# Patient Record
Sex: MALE | Race: WHITE | NOT HISPANIC OR LATINO | Employment: STUDENT | URBAN - METROPOLITAN AREA
[De-identification: names, ages, dates, MRNs, and addresses within clinical notes are randomized per-mention and may not be internally consistent; named-entity substitution may affect disease eponyms.]

---
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
## 2023-01-04 ENCOUNTER — TELEPHONE (OUTPATIENT)
Dept: ORTHOPEDICS | Facility: CLINIC | Age: 18
End: 2023-01-04

## 2023-01-04 NOTE — TELEPHONE ENCOUNTER
I called Briana with [facility] back and directed them to the [facility] phone number and fax number for their medical records request as we are not able to release that information through the clinic.     Sudarshan Ellis, EMT

## 2023-01-04 NOTE — TELEPHONE ENCOUNTER
[facility] Call Center    Phone Message    May a detailed message be left on voicemail: yes     Reason for Call: Other: Dr Call from [facility] requesting imaging xrays most recent and reports faxed to [phone] . They will be faxing NICOLE . Not sure if images can be pushed to their system      Action Taken: Other: ortho [facility]    Travel Screening: Not Applicable

## 2023-02-03 ENCOUNTER — TELEPHONE (OUTPATIENT)
Dept: ORTHOPEDICS | Facility: CLINIC | Age: 18
End: 2023-02-03
Payer: COMMERCIAL

## 2023-02-03 NOTE — TELEPHONE ENCOUNTER
See My Chart message sent from pt wanting documentation for the airline of [facility] & canceled appts 2-8-23 & 2-16-23.  I called pt back & told him I will do a letter of documentation needed & he can print letter from My Chart & I will also postal mail it to him.  He stated they just bought a $4,000 Brace to see if it helps symptoms & postpone surgery as long as poss. So he does not need RTN appt at this time.  He understands whenever he makes RTN appt in future, that he needs the 3 MRIs we ordered completed before appt.,  He has copies of 3 MRI orders to have done in [place].    Call back prn.  Pt agreed. Rizwana Salcido RN.

## 2023-02-03 NOTE — LETTER
Verification of Appointment  February 3, 2023         Patient:  Hitesh Dobson  :   2005  MRN:     [id number]  Physician: RIZWANA IRIZARRY    Due to 's change in schedule, [facility]   canceled & rescheduled patients   23 appointment to 23 and then canceled the   Appointment on 23 because patient does not need a  Return appointment at this time.    Thanks for your consideration for this patient.              Electronically signed by Rizwana Irizarry RN with DR.David Torres

## 2023-02-12 ENCOUNTER — HEALTH MAINTENANCE LETTER (OUTPATIENT)
Age: 18
End: 2023-02-12

## 2023-07-18 ENCOUNTER — TELEPHONE (OUTPATIENT)
Dept: ORTHOPEDICS | Facility: CLINIC | Age: 18
End: 2023-07-18
Payer: COMMERCIAL

## 2023-07-18 NOTE — TELEPHONE ENCOUNTER
Called pt. He would like to get his MRIs done at [facility] in [place], [place]. ATC called them and obtained fax number (). MRIs order faxed to them.         -Ishan ATC- Orthopedics

## 2023-07-18 NOTE — TELEPHONE ENCOUNTER
[facility] Call Center    Phone Message    May a detailed message be left on voicemail: yes     Reason for Call: Other: Patient is requesting an order for an MRI to be taken near his home in [place], [place].     Action Taken: Message routed to:  [facility] ortho    Travel Screening: Not Applicable

## 2023-09-12 ENCOUNTER — TELEPHONE (OUTPATIENT)
Dept: ORTHOPEDICS | Facility: CLINIC | Age: 18
End: 2023-09-12
Payer: COMMERCIAL

## 2023-09-12 NOTE — TELEPHONE ENCOUNTER
[facility] Call Center    Phone Message    May a detailed message be left on voicemail: yes     Reason for Call: Other: Kamille called Hitesh needs to have the referrals for his MRIs that Dr. Torres requested faxed to [facility] in [place], [place]. Kamille also needs to know if the MRIs need a prior authorization and if so she needs the CPT codes to give to [employer]. Please call Kamille at [phone] to discuss      Action Taken: Other: [facility]    Travel Screening: Not Applicable

## 2023-09-13 NOTE — TELEPHONE ENCOUNTER
Writer called and spoke with mother on the phone. Writer re-faxed the imaging orders to [facility] in [place], [place] at [phone]. Writer also gave pt the number to call and see if can get the MRI's completed at the [facility] prior to the 10/12/23 appointment with Dr. Torres.   Pt's mother will call [phone].     Kimberly Reed LPN

## 2023-10-03 DIAGNOSIS — M42.00 SCHEUERMANN'S KYPHOSIS: Primary | ICD-10-CM

## 2023-10-10 ENCOUNTER — TELEPHONE (OUTPATIENT)
Dept: ORTHOPEDICS | Facility: CLINIC | Age: 18
End: 2023-10-10
Payer: COMMERCIAL

## 2023-10-10 NOTE — TELEPHONE ENCOUNTER
Writer called spoke with pt's mother stating that from clinic standpoint there is no reason pt can not come to be seen. IF patient is symptomatic will ask that patient wear a mask. Pt's mother states that at this time pt is not symptomatic and will keep the 10/12/23 apt.     Kimberly Reed LPN

## 2023-10-12 ENCOUNTER — ANCILLARY PROCEDURE (OUTPATIENT)
Dept: GENERAL RADIOLOGY | Facility: CLINIC | Age: 18
End: 2023-10-12
Attending: ORTHOPAEDIC SURGERY
Payer: COMMERCIAL

## 2023-10-12 ENCOUNTER — OFFICE VISIT (OUTPATIENT)
Dept: ORTHOPEDICS | Facility: CLINIC | Age: 18
End: 2023-10-12
Payer: COMMERCIAL

## 2023-10-12 VITALS — WEIGHT: 147 LBS | BODY MASS INDEX: 19.91 KG/M2 | HEIGHT: 72 IN

## 2023-10-12 DIAGNOSIS — M51.24 THORACIC DISC HERNIATION: ICD-10-CM

## 2023-10-12 DIAGNOSIS — M42.00 SCHEUERMANN'S KYPHOSIS: ICD-10-CM

## 2023-10-12 DIAGNOSIS — M42.00 SCHEUERMANN'S KYPHOSIS: Primary | ICD-10-CM

## 2023-10-12 PROCEDURE — 72082 X-RAY EXAM ENTIRE SPI 2/3 VW: CPT | Performed by: STUDENT IN AN ORGANIZED HEALTH CARE EDUCATION/TRAINING PROGRAM

## 2023-10-12 PROCEDURE — 77073 BONE LENGTH STUDIES: CPT | Performed by: STUDENT IN AN ORGANIZED HEALTH CARE EDUCATION/TRAINING PROGRAM

## 2023-10-12 PROCEDURE — 99214 OFFICE O/P EST MOD 30 MIN: CPT | Mod: GC | Performed by: ORTHOPAEDIC SURGERY

## 2023-10-12 RX ORDER — METHYLPREDNISOLONE 4 MG
4 TABLET, DOSE PACK ORAL
COMMUNITY
Start: 2023-10-09 | End: 2023-10-15

## 2023-10-12 NOTE — PROGRESS NOTES
Orthopedic spine surgery clinic note    CC: Surgical discussion follow-up    HISTORY OF PRESENT ILLNESS:  Hitesh is a 18-year-old male presenting for follow-up to further discuss possible surgical intervention of his Scheuermann's kyphosis.  Patient last seen October 2022.  At that time, given amount of deformity and severity of symptoms had an extensive discussion regarding possible surgical intervention.  Plan was for patient to get MRI of his cervical, thoracic, and lumbar spine to rule out any cord abnormalities and to call back when he was ready to schedule surgery.  Patient returning today to further discuss surgery.    Since he was last seen, patient reports no major changes in his health.  Still with diffuse back pain in his neck, mid and low back, and hips.  Denies any changes over the last year in is back in regards to symptoms and overall function but remains unhappy with his back.  His visual analog pain scale currently is a 3.  His Oswestry score is an 14.      Patient currently in his second year at [employer] and studying computer science.  Patient would like to move forward with surgery next summer when school gets out.  The school setting and in mid April so would plan to move forward with surgery in early May.    Patient denies any radicular symptoms or pain.  Denies any bowel or bladder changes.  Denies any sensory changes.  Denies any ataxia or loss of coordination in his upper or lower extremities.    PAST MEDICAL HISTORY:  He is the product of a normal pregnancy and delivery.  He did have a nuchal cord.  He met normal milestones for growth and development.  He has 1 brother who has no current issues.  The brother is a younger brother.  He has a maternal grandfather who had a curved back, but exact details are not clear.    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  Activity wise, he has been active in basketball,  volleyball, and wrestling.  He is not currently on competitive, but only intramural type activities in college.    PHYSICAL EXAMINATION:  Reveals a well-developed, well-nourished male in no acute distress.  Nonantalgic, nonmyelopathic gait.  Negative Lhermitte. able to tandem walk.  Negative Romberg.  His head is centered over his pelvis.  Shoulders and pelvis are level. He has marked increased thoracic kyphosis.  He has a mild scoliotic deformity angle of trunk rotation, 5 degrees in the thoracic spine and 5 degrees in the lumbar spine on Seymour's forward bending test.  Lower extremity reflexes are 2+ and symmetric at the knees and ankles.  No sensory changes when testing sensation longitudinally along thoracic region of the spine bilaterally.  Manual muscle testing is intact for foot dorsiflexion, plantarflexion, knee extension, hip flexion.    IMAGING:  Radiographic exam performed today includes AP and lateral full spine.  The lateral radiographic films of the key, which shows a stable thoracic kyphosis of approximately 80 degrees.      MRI of the thoracic spine does show anterior wedging of the thoracic vertebrae and Schmorl nodes consistent with diagnosis of Scheuermann's kyphosis.  Patient also multiple mild posterior disc herniations throughout the thoracic spine with subtle effacement of the cord.        ASSESSMENT:  Scheuermann's kyphosis, symptomatic, severe. 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    PLAN:  He remains a candidate for a posterior spinal fusion.  Given thoracic spine disc herniations and early cord effacement, recommend to be done in the next 1-2 years rather than wait indefinitely.  Anticipate this would be from T3-L1 with Smith-Gallo osteotomies T6 through T12. The surgery will probably take about 4.5 hours, approximately 600 mL of blood loss, and a 5-day length of stay.  We talked about the hospitalization and then the recuperation.  I would expect he would be out of school for 6  weeks and then back to sports at 3 months.    We reviewed the specific risks of surgery.  The risks include but are not limited to the risks of general anesthesia including death, heart attack, stroke, blood clot, pneumonia, and bladder infection.  Surgical risks include but are not limited to bleeding requiring a blood transfusion.  We would use tranexamic acid and a Cell Saver.  Infection:  He will be treated with prophylactic antibiotics in intra-wound antibiotics.  Nerve damage including possible paralysis.  We will use spinal cord monitoring.  Failure to heal, requiring reoperation.  Hardware problems.  We will use intraoperative O-arm and Stealth for placing the pedicle screws.  Finally, with Scheuermann's kyphosis, we can get a great radiographic result, but the patients can still have pain or symptoms.  The majority of patients are happy with their improvement, but there are risks of problems above or below wherever the fusion stops.  The challenge is that there is no nonoperative treatment for this and it is likely to alter the significant deformity and the fact that he is maximally extending his lumbar spine shows that he is at the edge of ability to compensate for the significant deformity.  He does meet the Barbosa criteria, wedging of 3 adjacent vertebral or more.    We will tentatively plan for patient to undergo surgery in May 2024.  He should be seen several month beforehand to ensure his health remains stable (around March 2024).  Patient was advised to follow-up sooner if he begins to experience myelopathic symptoms (ataxia, bowel or bladder changes, radicular symptoms down legs with Valsalva) which would be an indication for more urgent surgical intervention.    Patient expressed understanding and is in agreement with this plan. All questions answered.      Patient seen and discussed with Dr. Torres.    Santos Holcomb MD  Orthopaedic Surgery, PGY-4  Pager: [phone]    I saw and evaluated the  patient and developed the plan.  I independently interpreted the imaging studies.  I reviewed these with the patient and his family.  I explained the indication for surgery.  We discussed the surgery in some detail.  Given the thoracic disc herniations I think that it is prudent to move forward in a planned fashion.  This is not emergent but it is indicated.  The plan to move forward with surgery in the May timeframe is appropriate.  I did enter a surgical case request at this visit.

## 2023-10-12 NOTE — NURSING NOTE
"Reason For Visit:   Chief Complaint   Patient presents with    RECHECK     recheck kyphosis, discuss surgery again       Primary MD: No Ref-Primary, Physician  Ref. MD: [facility]    ?  No  Occupation Student.     Date of injury: no  Type of injury: no.     Date of surgery: no  Type of surgery: no.     Smoker: No  Request smoking cessation information: No      Ht 1.838 m (6' 0.36\")   Wt 66.7 kg (147 lb)   BMI 19.74 kg/m      Pain Assessment  Patient Currently in Pain: Yes  0-10 Pain Scale: 3  Primary Pain Location: Back    Oswestry (LUIS) Questionnaire        10/12/2023     6:42 AM   OSWESTRY DISABILITY INDEX   Count 10   Sum 7   Oswestry Score (%) 14 %        Visual Analog Pain Scale  Back Pain Scale 0-10: 3  Right leg pain: 0  Left leg pain: 0  Neck Pain Scale 0-10: 0  Right arm pain: 0  Left arm pain: 0    Promis 10 Assessment        10/12/2023     6:45 AM   PROMIS 10   In general, would you say your health is: Good   In general, would you say your quality of life is: Good   In general, how would you rate your physical health? Fair   In general, how would you rate your mental health, including your mood and your ability to think? Good   In general, how would you rate your satisfaction with your social activities and relationships? Good   In general, please rate how well you carry out your usual social activities and roles Good   To what extent are you able to carry out your everyday physical activities such as walking, climbing stairs, carrying groceries, or moving a chair? Completely   In the past 7 days, how often have you been bothered by emotional problems such as feeling anxious, depressed, or irritable? Often   In the past 7 days, how would you rate your fatigue on average? Mild   In the past 7 days, how would you rate your pain on average, where 0 means no pain, and 10 means worst imaginable pain? 4   In general, would you say your health is: 3   In general, would you say your quality of life is: 3 "   In general, how would you rate your physical health? 2   In general, how would you rate your mental health, including your mood and your ability to think? 3   In general, how would you rate your satisfaction with your social activities and relationships? 3   In general, please rate how well you carry out your usual social activities and roles. (This includes activities at home, at work and in your community, and responsibilities as a parent, child, spouse, employee, friend, etc.) 3   To what extent are you able to carry out your everyday physical activities such as walking, climbing stairs, carrying groceries, or moving a chair? 5   In the past 7 days, how often have you been bothered by emotional problems such as feeling anxious, depressed, or irritable? 4   In the past 7 days, how would you rate your fatigue on average? 2   In the past 7 days, how would you rate your pain on average, where 0 means no pain, and 10 means worst imaginable pain? 4   Global Mental Health Score 11   Global Physical Health Score 14   PROMIS TOTAL - SUBSCORES 25                Kimberly Reed LPN

## 2023-10-12 NOTE — LETTER
10/12/2023         RE: Hitesh Dobson  [address]  [place] [address]        Dear Colleague,    Thank you for referring your patient, Hitesh Dobson, to the [facility] [place]. Please see a copy of my visit note below.    Orthopedic spine surgery clinic note    CC: Surgical discussion follow-up    HISTORY OF PRESENT ILLNESS:  Hitesh is a 18-year-old male presenting for follow-up to further discuss possible surgical intervention of his Scheuermann's kyphosis.  Patient last seen October 2022.  At that time, given amount of deformity and severity of symptoms had an extensive discussion regarding possible surgical intervention.  Plan was for patient to get MRI of his cervical, thoracic, and lumbar spine to rule out any cord abnormalities and to call back when he was ready to schedule surgery.  Patient returning today to further discuss surgery.    Since he was last seen, patient reports no major changes in his health.  Still with diffuse back pain in his neck, mid and low back, and hips.  Denies any changes over the last year in is back in regards to symptoms and overall function but remains unhappy with his back.  His visual analog pain scale currently is a 3.  His Oswestry score is an 14.      Patient currently in his second year at [employer] and studying computer science.  Patient would like to move forward with surgery next summer when school gets out.  The school setting and in mid April so would plan to move forward with surgery in early May.    Patient denies any radicular symptoms or pain.  Denies any bowel or bladder changes.  Denies any sensory changes.  Denies any ataxia or loss of coordination in his upper or lower extremities.    PAST MEDICAL HISTORY:  He is the product of a normal pregnancy and delivery.  He did have a nuchal cord.  He met normal milestones for growth and development.  He has 1 brother who has no current issues.  The brother is a younger brother.  He has a  maternal grandfather who had a curved back, but exact details are not clear.    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  Activity wise, he has been active in basketball, volleyball, and wrestling.  He is not currently on competitive, but only intramural type activities in college.    PHYSICAL EXAMINATION:  Reveals a well-developed, well-nourished male in no acute distress.  Nonantalgic, nonmyelopathic gait.  Negative Lhermitte. able to tandem walk.  Negative Romberg.  His head is centered over his pelvis.  Shoulders and pelvis are level. He has marked increased thoracic kyphosis.  He has a mild scoliotic deformity angle of trunk rotation, 5 degrees in the thoracic spine and 5 degrees in the lumbar spine on Seymour's forward bending test.  Lower extremity reflexes are 2+ and symmetric at the knees and ankles.  No sensory changes when testing sensation longitudinally along thoracic region of the spine bilaterally.  Manual muscle testing is intact for foot dorsiflexion, plantarflexion, knee extension, hip flexion.    IMAGING:  Radiographic exam performed today includes AP and lateral full spine.  The lateral radiographic films of the key, which shows a stable thoracic kyphosis of approximately 80 degrees.      MRI of the thoracic spine does show anterior wedging of the thoracic vertebrae and Schmorl nodes consistent with diagnosis of Scheuermann's kyphosis.  Patient also multiple mild posterior disc herniations throughout the thoracic spine with subtle effacement of the cord.        ASSESSMENT:  Scheuermann's kyphosis, symptomatic, severe. 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    PLAN:  He remains a candidate for a posterior spinal fusion.  Given thoracic spine disc herniations and early cord effacement, recommend to be done in the next 1-2 years rather than wait indefinitely.  Anticipate this would be from T3-L1 with Smith-Gallo  osteotomies T6 through T12. The surgery will probably take about 4.5 hours, approximately 600 mL of blood loss, and a 5-day length of stay.  We talked about the hospitalization and then the recuperation.  I would expect he would be out of school for 6 weeks and then back to sports at 3 months.    We reviewed the specific risks of surgery.  The risks include but are not limited to the risks of general anesthesia including death, heart attack, stroke, blood clot, pneumonia, and bladder infection.  Surgical risks include but are not limited to bleeding requiring a blood transfusion.  We would use tranexamic acid and a Cell Saver.  Infection:  He will be treated with prophylactic antibiotics in intra-wound antibiotics.  Nerve damage including possible paralysis.  We will use spinal cord monitoring.  Failure to heal, requiring reoperation.  Hardware problems.  We will use intraoperative O-arm and Stealth for placing the pedicle screws.  Finally, with Scheuermann's kyphosis, we can get a great radiographic result, but the patients can still have pain or symptoms.  The majority of patients are happy with their improvement, but there are risks of problems above or below wherever the fusion stops.  The challenge is that there is no nonoperative treatment for this and it is likely to alter the significant deformity and the fact that he is maximally extending his lumbar spine shows that he is at the edge of ability to compensate for the significant deformity.  He does meet the Barbosa criteria, wedging of 3 adjacent vertebral or more.    We will tentatively plan for patient to undergo surgery in May 2024.  He should be seen several month beforehand to ensure his health remains stable (around March 2024).  Patient was advised to follow-up sooner if he begins to experience myelopathic symptoms (ataxia, bowel or bladder changes, radicular symptoms down legs with Valsalva) which would be an indication for more urgent surgical  intervention.    Patient expressed understanding and is in agreement with this plan. All questions answered.      Patient seen and discussed with Dr. Torres.    Santos Holcomb MD  Orthopaedic Surgery, PGY-4  Pager: [phone]    I saw and evaluated the patient and developed the plan.  I independently interpreted the imaging studies.  I reviewed these with the patient and his family.  I explained the indication for surgery.  We discussed the surgery in some detail.  Given the thoracic disc herniations I think that it is prudent to move forward in a planned fashion.  This is not emergent but it is indicated.  The plan to move forward with surgery in the May timeframe is appropriate.  I did enter a surgical case request at this visit.          Teaching Flowsheet   Relevant Diagnosis: spine  Teaching Topic: preop     Person(s) involved in teaching:   Patient and Mother     Motivation Level:  Asks Questions: Yes  Eager to Learn: Yes  Cooperative: Yes  Receptive (willing/able to accept information): Yes  Any cultural factors/Moravian beliefs that may influence understanding or compliance? No       Patient and Family demonstrates understanding of the following:  Reason for the appointment, diagnosis and treatment plan: Yes  Knowledge of proper use of medications and conditions for which they are ordered (with special attention to potential side effects or drug interactions): Yes  Which situations necessitate calling provider and whom to contact: Yes       Teaching Concerns Addressed:        Proper use and care of meds (medical equip, care aids, etc.): Yes  Nutritional needs and diet plan: Yes  Pain management techniques: Yes  Wound Care: Yes  How and/when to access community resources: Yes     Instructional Materials Used/Given: preop pkt     Time spent with patient: 15 minutes.

## 2023-10-13 NOTE — PROGRESS NOTES
Teaching Flowsheet   Relevant Diagnosis: spine  Teaching Topic: preop     Person(s) involved in teaching:   Patient and Mother     Motivation Level:  Asks Questions: Yes  Eager to Learn: Yes  Cooperative: Yes  Receptive (willing/able to accept information): Yes  Any cultural factors/Temple beliefs that may influence understanding or compliance? No       Patient and Family demonstrates understanding of the following:  Reason for the appointment, diagnosis and treatment plan: Yes  Knowledge of proper use of medications and conditions for which they are ordered (with special attention to potential side effects or drug interactions): Yes  Which situations necessitate calling provider and whom to contact: Yes       Teaching Concerns Addressed:        Proper use and care of meds (medical equip, care aids, etc.): Yes  Nutritional needs and diet plan: Yes  Pain management techniques: Yes  Wound Care: Yes  How and/when to access community resources: Yes     Instructional Materials Used/Given: preop pkt     Time spent with patient: 15 minutes.

## 2023-11-17 ENCOUNTER — TELEPHONE (OUTPATIENT)
Dept: ORTHOPEDICS | Facility: CLINIC | Age: 18
End: 2023-11-17
Payer: COMMERCIAL

## 2023-11-17 NOTE — TELEPHONE ENCOUNTER
Phoned patient to discuss scheduling his surgery with Dr Torres after the school year ends in 2024. Patient stated he is still interested in having surgery during that time, preferably as soon as school is out. Patient will speak with his family about timing and will call back when he is ready to select a date. Patient understands we will work with him to schedule any pre-surgery appointments once we know the surgery date. Patient has the direct number to call back to surgery scheduling, [phone]

## 2023-11-29 NOTE — TELEPHONE ENCOUNTER
Patient is scheduled for surgery with Dr. Torres    Spoke with: Patient    Date of Surgery: 5/20/23    Location: [facility] [place]    Post op: 6 & 12 weeks    Pre op with Provider:     H&P: Scheduled with     Additional imaging/appointments:     Surgery packet: Received in clinic    Additional comments: N/A        Tammy Hay MA on 11/29/2023 at 10:52 AM

## 2023-12-01 NOTE — TELEPHONE ENCOUNTER
Phoned patient to schedule pre-op visits before surgery in May of 2024. Per care team, patient is to be seen by Dr Torres in March, and then is to return in April for his PAC visit to be done within 30 days of surgery. I left the patient my direct number to call back when he is able. [phone]

## 2023-12-04 NOTE — TELEPHONE ENCOUNTER
Received call back from patient to set up pre-op visits. Patient scheduled to see Dr Torres on 3/7/24 and will return to see PAC on 4/22/. Surgery planned for 5/20/24.

## 2024-01-24 NOTE — TELEPHONE ENCOUNTER
FUTURE VISIT INFORMATION      SURGERY INFORMATION:  Date: 5/20/24  [facility]  Surgeon:  Jayson Torres MD   Anesthesia Type:  general  Procedure: Posterior Spinal Fusion Thoracic 3-Lumbar 1, segmental instrumentation, Hernandez Gallo Osteotomies Thoracic 6-11   Consult: ov 10/12/23    RECORDS REQUESTED FROM:       Primary Care Provider: MARYBETH

## 2024-02-23 DIAGNOSIS — M42.00 SCHEUERMANN'S KYPHOSIS: Primary | ICD-10-CM

## 2024-03-10 ENCOUNTER — HEALTH MAINTENANCE LETTER (OUTPATIENT)
Age: 19
End: 2024-03-10

## 2024-04-10 DIAGNOSIS — M51.24 THORACIC DISC HERNIATION: ICD-10-CM

## 2024-04-10 DIAGNOSIS — M42.00 SCHEUERMANN'S KYPHOSIS: Primary | ICD-10-CM

## 2024-04-22 ENCOUNTER — PRE VISIT (OUTPATIENT)
Dept: SURGERY | Facility: CLINIC | Age: 19
End: 2024-04-22

## 2024-04-23 LAB
ABO/RH(D): NORMAL
ANTIBODY SCREEN: NEGATIVE
SPECIMEN EXPIRATION DATE: NORMAL

## 2024-04-24 ENCOUNTER — OFFICE VISIT (OUTPATIENT)
Dept: ORTHOPEDICS | Facility: CLINIC | Age: 19
End: 2024-04-24
Payer: COMMERCIAL

## 2024-04-24 ENCOUNTER — ANCILLARY PROCEDURE (OUTPATIENT)
Dept: GENERAL RADIOLOGY | Facility: CLINIC | Age: 19
End: 2024-04-24
Attending: ORTHOPAEDIC SURGERY
Payer: COMMERCIAL

## 2024-04-24 ENCOUNTER — LAB (OUTPATIENT)
Dept: LAB | Facility: CLINIC | Age: 19
End: 2024-04-24
Payer: COMMERCIAL

## 2024-04-24 ENCOUNTER — PRE VISIT (OUTPATIENT)
Dept: SURGERY | Facility: CLINIC | Age: 19
End: 2024-04-24

## 2024-04-24 ENCOUNTER — ANESTHESIA EVENT (OUTPATIENT)
Dept: SURGERY | Facility: CLINIC | Age: 19
End: 2024-04-24
Payer: COMMERCIAL

## 2024-04-24 ENCOUNTER — OFFICE VISIT (OUTPATIENT)
Dept: SURGERY | Facility: CLINIC | Age: 19
End: 2024-04-24
Payer: COMMERCIAL

## 2024-04-24 VITALS
HEART RATE: 54 BPM | SYSTOLIC BLOOD PRESSURE: 107 MMHG | WEIGHT: 146 LBS | RESPIRATION RATE: 16 BRPM | OXYGEN SATURATION: 99 % | BODY MASS INDEX: 19.35 KG/M2 | HEIGHT: 73 IN | TEMPERATURE: 97.8 F | DIASTOLIC BLOOD PRESSURE: 64 MMHG

## 2024-04-24 VITALS — WEIGHT: 148 LBS | BODY MASS INDEX: 20.05 KG/M2 | HEIGHT: 72 IN

## 2024-04-24 DIAGNOSIS — M42.00 SCHEUERMANN'S KYPHOSIS: Primary | ICD-10-CM

## 2024-04-24 DIAGNOSIS — M51.24 THORACIC DISC HERNIATION: ICD-10-CM

## 2024-04-24 DIAGNOSIS — M42.00 SCHEUERMANN'S KYPHOSIS: ICD-10-CM

## 2024-04-24 DIAGNOSIS — Z01.818 PRE-OP EXAMINATION: Primary | ICD-10-CM

## 2024-04-24 LAB
ANION GAP SERPL CALCULATED.3IONS-SCNC: 11 MMOL/L (ref 7–15)
BUN SERPL-MCNC: 11.1 MG/DL (ref 6–20)
CALCIUM SERPL-MCNC: 9.6 MG/DL (ref 8.6–10)
CHLORIDE SERPL-SCNC: 105 MMOL/L (ref 98–107)
CREAT SERPL-MCNC: 0.98 MG/DL (ref 0.67–1.17)
DEPRECATED HCO3 PLAS-SCNC: 25 MMOL/L (ref 22–29)
EGFRCR SERPLBLD CKD-EPI 2021: >90 ML/MIN/1.73M2
ERYTHROCYTE [DISTWIDTH] IN BLOOD BY AUTOMATED COUNT: 12.7 % (ref 10–15)
GLUCOSE SERPL-MCNC: 82 MG/DL (ref 70–99)
HCT VFR BLD AUTO: 43.7 % (ref 40–53)
HGB BLD-MCNC: 14.4 G/DL (ref 13.3–17.7)
MCH RBC QN AUTO: 30.1 PG (ref 26.5–33)
MCHC RBC AUTO-ENTMCNC: 33 G/DL (ref 31.5–36.5)
MCV RBC AUTO: 91 FL (ref 78–100)
PLATELET # BLD AUTO: 257 10E3/UL (ref 150–450)
POTASSIUM SERPL-SCNC: 4.1 MMOL/L (ref 3.4–5.3)
RBC # BLD AUTO: 4.78 10E6/UL (ref 4.4–5.9)
SODIUM SERPL-SCNC: 141 MMOL/L (ref 135–145)
WBC # BLD AUTO: 5.8 10E3/UL (ref 4–11)

## 2024-04-24 PROCEDURE — 36415 COLL VENOUS BLD VENIPUNCTURE: CPT | Performed by: PATHOLOGY

## 2024-04-24 PROCEDURE — 99215 OFFICE O/P EST HI 40 MIN: CPT | Mod: GC | Performed by: ORTHOPAEDIC SURGERY

## 2024-04-24 PROCEDURE — 80048 BASIC METABOLIC PNL TOTAL CA: CPT | Performed by: PATHOLOGY

## 2024-04-24 PROCEDURE — 85027 COMPLETE CBC AUTOMATED: CPT | Performed by: PATHOLOGY

## 2024-04-24 PROCEDURE — 86900 BLOOD TYPING SEROLOGIC ABO: CPT | Performed by: NURSE PRACTITIONER

## 2024-04-24 PROCEDURE — 72082 X-RAY EXAM ENTIRE SPI 2/3 VW: CPT | Performed by: STUDENT IN AN ORGANIZED HEALTH CARE EDUCATION/TRAINING PROGRAM

## 2024-04-24 PROCEDURE — 99203 OFFICE O/P NEW LOW 30 MIN: CPT | Performed by: NURSE PRACTITIONER

## 2024-04-24 PROCEDURE — 77073 BONE LENGTH STUDIES: CPT | Performed by: STUDENT IN AN ORGANIZED HEALTH CARE EDUCATION/TRAINING PROGRAM

## 2024-04-24 PROCEDURE — 72100 X-RAY EXAM L-S SPINE 2/3 VWS: CPT | Performed by: STUDENT IN AN ORGANIZED HEALTH CARE EDUCATION/TRAINING PROGRAM

## 2024-04-24 RX ORDER — MULTIVITAMIN
1 TABLET ORAL DAILY
COMMUNITY

## 2024-04-24 ASSESSMENT — PAIN SCALES - GENERAL: PAINLEVEL: MILD PAIN (2)

## 2024-04-24 NOTE — PROGRESS NOTES
Orthopedic spine surgery clinic note     CC: Surgical preoperative discussion     HISTORY OF PRESENT ILLNESS:  Hitesh is a 19-year-old male presenting for follow-up to answer any questions before his posterior spinal fusion - planned for 5/21/24.  Patient last seen 10/12/2023.  At that time, given amount of deformity and severity of symptoms had an extensive discussion regarding possible surgical intervention.  He wished to proceed with follow up in month prior to answer questions.    He is here today with his mother, Jen. They drove from [place], [place]. 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 He reports his back and hip pain have worsened since last visit. He actually feels better when being active and mainly experiences pain at rest. He has occasional pain at night. He does not take medication for pain. LUIS today is 24%.      Patient denies any radicular symptoms or pain.  Denies any bowel or bladder changes.  Denies any sensory changes.  Denies any ataxia or loss of coordination in his upper or lower extremities.     PAST MEDICAL HISTORY:  He is the product of a normal pregnancy and delivery.  He did have a nuchal cord.  He met normal milestones for growth and development.  He has 1 brother who has no current issues.  The brother is a younger brother.  He has a maternal grandfather who had a curved back, but exact details are not clear.     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  Activity wise, he has been active in basketball, volleyball, and wrestling.  He is not currently on competitive, but only intramural type activities in college.     PHYSICAL EXAMINATION:  Reveals a well-developed, well-nourished male in no acute distress.  Nonantalgic, nonmyelopathic gait.  Able to toe walk, able to tandem walk.  His head is centered over his  pelvis.  Shoulders and pelvis are level. He has marked increased thoracic kyphosis.  He has a mild scoliotic deformity angle of trunk rotation, 5 degrees in the thoracic spine and 5 degrees in the lumbar spine on Seymour's forward bending test.  Lower extremity reflexes are 2+ and symmetric at the ankles.  Of note, he has difficulty relaxing completely for patellar reflexes.  2+ left patella, right hard to elicit. 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 No sensory changes when testing sensation longitudinally along thoracic region of the spine bilaterally.  Manual muscle testing is intact for foot dorsiflexion, plantarflexion, knee extension, hip flexion 5/5 strength.     IMAGING:  Radiographic exam performed today includes AP and lateral full spine performed on EOS.  The lateral radiographic films of the key, which shows a stable thoracic kyphosis of approximately 74 degrees.    T2-12 74 degrees  PI 54 degrees  L1-S1 80 degrees     MRI of the thoracic spine does show anterior wedging of the thoracic vertebrae and Schmorl nodes consistent with diagnosis of Scheuermann's kyphosis.  Patient also multiple mild posterior disc herniations throughout the thoracic spine with subtle effacement of the cord.                          ASSESSMENT:  Scheuermann's kyphosis, symptomatic, severe. 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     PLAN: We again reviewed his diagnosis and the proposed surgery.  Given thoracic spine disc herniations and early cord effacement, recommend to be done in the next 1-2 years rather than wait indefinitely.  Anticipate this would be from T3-L1 with Smith-Gallo osteotomies T6 through T12. The surgery will probably take about 4.5 hours, approximately 600 mL of blood loss, and a 5-day length of stay.  We talked about the hospitalization and then the recuperation.  I would expect he would be out of school for 6 weeks and then back to sports at 3 months.  He asked good questions about return to  activity.  We anticipate he will regain almost all of his strength.  There is a small chance he will not have quite as much flexibility as he once had, unclear if he will be able to still do back flips.     We reviewed the specific risks of surgery.  The risks include but are not limited to the risks of general anesthesia including death, heart attack, stroke, blood clot, pneumonia, and bladder infection.  Surgical risks include but are not limited to bleeding requiring a blood transfusion.  We would use tranexamic acid and a Cell Saver.  Infection:  He will be treated with prophylactic antibiotics in intra-wound antibiotics.  Nerve damage including possible paralysis.  We will use spinal cord monitoring.  Failure to heal, requiring reoperation.  Hardware problems.  We will use intraoperative O-arm and Stealth for placing the pedicle screws.  Finally, with Scheuermann's kyphosis, we can get a great radiographic result, but the patients can still have pain or symptoms.  The majority of patients are happy with their improvement, but there are risks of problems above or below wherever the fusion stops.  The challenge is that there is no nonoperative treatment for this and it is likely to alter the significant deformity and the fact that he is maximally extending his lumbar spine shows that he is at the edge of ability to compensate for the significant deformity.  He does meet the Barbosa criteria, wedging of 3 adjacent vertebral or more.     We will see him back for surgery in May.  He was given our contact information for any questions that arise.  He wishes to proceed with surgery.     Patient expressed understanding and is in agreement with this plan. All questions answered.     Patient seen and discussed with Dr. Torers.    Angelita Ocampo, PGY-4    I saw and evaluated the patient and developed the plan.  I reviewed his imaging with him.  Specifically his lumbar extension film equals a standing lumbar film in terms of  lordosis indicating that he is used up all of his spinal reserve in order to be able to stand up.  He has about a 20 to 25 degree kyphosis beyond what would be expected given his pelvic incidence.  I showed him a representative example of a similar case.  This is strong desire to proceed at this point in time.  He had a series of questions that we reviewed in terms of duration of surgery and return to work progression.    My total contact time for this visit including image ordering, independent image interpretation, face-to-face evaluation, documentation was greater than 45 minutes.    Jayson Torres MD

## 2024-04-24 NOTE — PATIENT INSTRUCTIONS
Preparing for Your Surgery      Name:  Hitesh Dobson   MRN:  [id number]   :  2005   Today's Date:  2024       Arriving for surgery:  Surgery date:  24  Arrival time:  05:30 am          Please come to:      [facility] [address]   [address] [place]  [address]   The Green Ramp for patients and visitors is beneath the [facility], [facility]. The parking facility entrance is at the intersection of [address] [place] [address] and [address]. Patients and visitors who self-park will receive the reduced hospital parking rate (no ticket validation needed).     CSR parking, located at the [facility] main entrance on [address] [place] [address], is available Monday - Friday from 7 am to 3:30 pm.     Discounted parking pass options can be purchased from  attendants during business hours.     -Check in at the security desk in the [facility] ([facility])   Lobby. They will direct you to the correct elevators.   -Proceed to the 3rd floor, Adult Surgery Waiting Lounge. [phone]     If you need directions, a wheelchair or escort please stop at the Information Desk in the lobby.  Inform the information person you are here for surgery; a wheelchair and escort to [address] will be provided.   An escort to the Adult Surgery Waiting Lounge will be provided. .    What can I eat or drink?  -  You may eat and drink normally up to 8 hours prior to arrival time.  -  You may have clear liquids until 2 hours prior to arrival time.     Examples of clear liquids:  Water  Clear broth  Juices (apple, white grape, white cranberry  and cider) without pulp  Noncarbonated, powder based beverages  (lemonade and Ugo-Aid)  Sodas (Sprite, 7-Up, ginger ale and seltzer)  Coffee or tea (without milk or cream)  Gatorade    -  No Alcohol or cannabis products for at least 24 hours before surgery.     Which medicines  can I take?    Hold Aspirin for 7 days before surgery.   Hold Multivitamins for 7 days before surgery.  Hold Supplements for 7 days before surgery.  Hold Ibuprofen (Advil, Motrin) for 1 day(s) before surgery--unless otherwise directed by surgeon.  Hold Naproxen (Aleve) for 4 days before surgery.      -  PLEASE TAKE these medications the day of surgery:  Tylenol if needed.    How do I prepare myself?  - Please take 2 showers (one the night prior to surgery and one the morning of surgery) using Scrubcare or Hibiclens soap.    Use this soap only from the neck to your toes.     Leave the soap on your skin for one minute--then rinse thoroughly.      You may use your own shampoo and conditioner. No other hair products.   - Please remove all jewelry and body piercings.  - No lotions, deodorants or fragrance.  - No makeup or fingernail polish.   - Bring your ID and insurance card.    -If you use a CPAP machine, please bring the CPAP machine, tubing, and mask to hospital.    -If you have a Deep Brain Stimulator, Spinal Cord Stimulator, or any Neuro Stimulator device---you must bring the remote control to the hospital.      ALL PATIENTS GOING HOME THE SAME DAY OF SURGERY ARE REQUIRED TO HAVE A RESPONSIBLE ADULT TO DRIVE AND BE IN ATTENDANCE WITH THEM FOR 24 HOURS FOLLOWING SURGERY.    Covid testing policy as of 12/06/2022  Your surgeon will notify and schedule you for a COVID test if one is needed before surgery--please direct any questions or COVID symptoms to your surgeon      Questions or Concerns:    - For any questions regarding the day of surgery or your hospital stay, please contact the Pre Admission Nursing Office at [phone].       - If you have health changes between today and your surgery, please call your surgeon.       - For questions after surgery, please call your surgeons office.           Current Visitor Guidelines    You may have 2 visitors in the pre op area.    Visiting hours: 8 a.m. to 8:30  p.m.    Patients confirmed or suspected to have symptoms of COVID 19 or flu:     No visitors allowed for adult patients.   Children (under age 18) can have 1 named visitor.     People who are sick or showing symptoms of COVID 19 or flu:    Are not allowed to visit patients--we can only make exceptions in special situations.       Please follow these guidelines for your visit:          Please maintain social distance          Masking is optional--however at times you may be asked to wear a mask for the safety of yourself and others     Clean your hands with alcohol hand . Do this when you arrive at and leave the building and patient room,    And again after you touch your mask or anything in the room.     Go directly to and from the room you are visiting.     Stay in the patient s room during your visit. Limit going to other places in the hospital as much as possible     Leave bags and jackets at home or in the car.     For everyone s health, please don t come and go during your visit. That includes for smoking   during your visit.

## 2024-04-24 NOTE — H&P
Pre-Operative H & P     CC:  Preoperative exam to assess for increased cardiopulmonary risk while undergoing surgery and anesthesia.    Date of Encounter: 4/24/2024  Primary Care Physician:  No Ref-Primary, Physician     Reason for visit:   Encounter Diagnoses   Name Primary?    Pre-op examination Yes    Scheuermann's kyphosis     Thoracic disc herniation        HPI  Hitesh Dobson is a 19 year old male who presents for pre-operative H & P in preparation for  Procedure Information       Case: [id number] Date/Time: 05/21/24 0800    Procedure: Posterior Spinal Fusion Thoracic 3-Lumbar 1, segmental instrumentation, Hernandez Gallo Osteotomies Thoracic 6-11 (Spine)    Anesthesia type: General    Diagnosis:       Scheuermann's kyphosis [M42.00]      Thoracic disc herniation [M51.24]    Pre-op diagnosis:       Scheuermann's kyphosis [M42.00]      Thoracic disc herniation [M51.24]    [facility]: [facility]    Providers: Jayson Torres MD            The patient presents to the PAC in person today with his mom, Jen, in preparation for the above scheduled procedure with comorbid conditions including  Scheuermann's kyphosis and 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    The patient was seen by Dr. Torres in the orthopedic spine surgery clinic for symptomatic, severe Scheuermann's kyphosis and  thoracic disc herniations without myelopathy. The patient suffers from diffuse back pain in his neck, mid and low back, and hips.  Dr. Torres counseled the patient of the findings and treatment options.  The patient has now been scheduled for the procedure as listed above.      History is obtained from the patient and chart review    Hx of abnormal bleeding or anti-platelet use: denies       Past Medical History  Past Medical History:   Diagnosis Date    Herniated thoracic disc without myelopathy     Scheuermann's kyphosis     Dick-Ranjit syndrome (H24) 2020    secondary to mycoplasma  pneumonia in spring of 2020.       Past Surgical History  Past Surgical History:   Procedure Laterality Date    EXTRACTION(S) DENTAL         Prior to Admission Medications  Current Outpatient Medications   Medication Sig Dispense Refill    multivitamin w/minerals (MULTI-VITAMIN) tablet Take 1 tablet by mouth daily When remember's         Allergies  Allergies   Allergen Reactions    Eye Drops Allergy Relief [Tetrahydrozoline-Zn Sulfate]      Other reaction(s): rhinitis       Social History  Social History     Socioeconomic History    Marital status: Single     Spouse name: Not on file    Number of children: Not on file    Years of education: Not on file    Highest education level: Not on file   Occupational History    Not on file   Tobacco Use    Smoking status: Never     Passive exposure: Never    Smokeless tobacco: Never   Substance and Sexual Activity    Alcohol use: Yes     Comment: 1 -2 drinks on wkd's 'not alway's'    Drug use: Not Currently     Types: Marijuana    Sexual activity: Not on file   Other Topics Concern    Not on file   Social History Narrative    Not on file     Social Determinants of Health     Financial Resource Strain: Not on file   Food Insecurity: Not on file   Transportation Needs: Not on file   Physical Activity: Not on file   Stress: Not on file   Social Connections: Not on file   Interpersonal Safety: Not on file   Housing Stability: Not on file       Family History  Family History   Problem Relation Age of Onset    Other - See Comments Father        Review of Systems  The complete review of systems is negative other than noted in the HPI or here.   Anesthesia Evaluation   Pt has had prior anesthetic. Type: MAC (Marathon teeth).    No history of anesthetic complications       ROS/MED HX  ENT/Pulmonary:  - neg pulmonary ROS     Neurologic:  - neg neurologic ROS     Cardiovascular:  - neg cardiovascular ROS     METS/Exercise Tolerance:  Comment: Active college student.  Goes to wellness  "center to work out daily.  Plays intermural basketball.    Hematologic:  - neg hematologic  ROS     Musculoskeletal:       GI/Hepatic:  - neg GI/hepatic ROS     Renal/Genitourinary:  - neg Renal ROS     Endo:  - neg endo ROS     Psychiatric/Substance Use:    (-) psychiatric history, alcohol abuse history and chronic opioid use history   Infectious Disease:  - neg infectious disease ROS     Malignancy:  - neg malignancy ROS     Other:  - neg other ROS    (+)  , H/O Chronic Pain,         /64 (BP Location: Right arm, Patient Position: Sitting, Cuff Size: Adult Regular)   Pulse 54   Temp 97.8  F (36.6  C) (Oral)   Resp 16   Ht 1.854 m (6' 1\")   Wt 66.2 kg (146 lb)   SpO2 99%   BMI 19.26 kg/m      Physical Exam   Constitutional: Awake, alert, cooperative, no apparent distress, and appears stated age.  Eyes: Pupils equal, round and reactive to light, extra ocular muscles intact, sclera clear, conjunctiva normal.  HENT: Normocephalic, oral pharynx with moist mucus membranes, good dentition. No goiter appreciated.   Respiratory: Clear to auscultation bilaterally, no crackles or wheezing.  Cardiovascular: Regular rate and rhythm, normal S1 and S2, and no murmur noted.  Carotids +2, no bruits. No edema. Palpable pulses to radial  DP and PT arteries.   GI: Normal bowel sounds, soft, non-distended, non-tender, no masses palpated, no hepatosplenomegaly. Lymph/Hematologic: No cervical lymphadenopathy and no supraclavicular lymphadenopathy.  Skin: Warm and dry.  No rashes at anticipated surgical site.   Musculoskeletal: Full ROM of neck. There is no redness, warmth, or swelling of the joints. Gross motor strength is normal.  Evidence of thoracic kyphosis.   Neurologic: Awake, alert, oriented to name, place and time. Cranial nerves II-XII are grossly intact. Gait is normal.   Neuropsychiatric: Calm, cooperative. Normal affect.     Prior Labs/Diagnostic Studies   All labs and imaging personally reviewed   -COMPLETE " BLOOD COUNT WITH DIFFERENTIAL  Specimen: Blood - Blood specimen (specimen)  Component  Ref Range & Units 4 yr ago   WBC  4.0 - 11.0 K/uL 8.5   RBC  4.30 - 5.50 M/uL 5.13   Hemoglobin  13.0 - 16.0 g/dL 14.8   Hematocrit  37.5 - 48.0 % 44.1   MCV  78.0 - 92.0 fL 86.0   MCH  25.0 - 34.0 pg 28.9   MCHC  33.0 - 37.0 g/dL 33.6   RDW-CV  11.5 - 15.5 % 12.3   Platelet Count  150 - 450 K/uL 201   MPV  8.5 - 12.0 fL 6.7 Low    Seg Neut Absolute  1.8 - 8.0 K/uL 7.5   Lymphocytes Absolute  1.0 - 5.0 K/uL 0.8 Low    Monocytes Absolute  0.0 - 1.0 K/uL 0.2   Neutrophils Abs. (Segs and Bands)  /uL 7,500   Neutrophils Percent  % 88.1   Lymphocytes Percent  % 9.0   Monocytes Percent  % 2.9   Resulting Agency [address]   Narrative  Performed by [address]    Seg Neut Absolute results contains cumulative values for # neutrophil, # eosinophil and # basophil.    Neutorphils Percent results contains cumulative values for % neutrophil, % eosinophil and % basophil.  Specimen Collected: 04/16/20 11:08 AM       PROCEDURES  Full body radiographs using EOS      History: Scheuermann's kyphosis                                                                      Impression:  1. Mild convex left curvature of the thoracic spine and mild convex  right curvature of the thoracolumbar spine.   2. No global sagittal or coronal imbalance.  3. Weight bearing axis as detailed above.     TALA ROBERSON MD   The patient's records and results personally reviewed by this provider.     Outside records reviewed from: Care Everywhere    LAB/DIAGNOSTIC STUDIES TODAY:     Latest Reference Range & Units 04/24/24 11:39   Sodium 135 - 145 mmol/L 141   Potassium 3.4 - 5.3 mmol/L 4.1   Chloride 98 - 107 mmol/L 105   Carbon Dioxide (CO2) 22 - 29 mmol/L 25   Urea Nitrogen 6.0 - 20.0 mg/dL 11.1   Creatinine 0.67 - 1.17 mg/dL 0.98   GFR Estimate >60 mL/min/1.73m2 >90   Calcium 8.6 - 10.0 mg/dL 9.6   Anion Gap 7 - 15 mmol/L 11  "  Glucose 70 - 99 mg/dL 82   WBC 4.0 - 11.0 10e3/uL 5.8   Hemoglobin 13.3 - 17.7 g/dL 14.4   Hematocrit 40.0 - 53.0 % 43.7   Platelet Count 150 - 450 10e3/uL 257   RBC Count 4.40 - 5.90 10e6/uL 4.78   MCV 78 - 100 fL 91   MCH 26.5 - 33.0 pg 30.1   MCHC 31.5 - 36.5 g/dL 33.0   RDW 10.0 - 15.0 % 12.7       Assessment    Hitesh Dobson is a 19 year old male seen as a PAC referral for risk assessment and optimization for anesthesia.    Plan/Recommendations  Pt will be optimized for the proposed procedure.  See below for details on the assessment, risk, and preoperative recommendations    NEUROLOGY  - No history of TIA, CVA or seizure    - Chronic Pain/back pain  ~ morphine equivilant = None     -Post Op delirium risk factors:  No risk identified    ENT  - No current airway concerns.  Will need to be reassessed day of surgery.  Mallampati: I  TM: > 3    CARDIAC  - No history of CAD, Hypertension, and Afib    - METS (Metabolic Equivalents)  Patient performs 4 or more METS exercise without symptoms             Total Score: 0      - RCRI-Low risk: Class 2 0.9% complication rate             Total Score: 1    RCRI: High Risk Surgery        PULMONARY  - [place] Low Risk             Total Score: 1    [place]: Male      - Denies asthma or inhaler use    - Tobacco History    History   Smoking Status    Never   Smokeless Tobacco    Never       GI  - Denies h/o GERD    - PONV Medium Risk  Total Score: 2           1 AN PONV: Patient is not a current smoker    1 AN PONV: Intended Post Op Opioids        /RENAL  - Baseline Creatinine  see above     ENDOCRINE    - BMI: Estimated body mass index is 19.26 kg/m  as calculated from the following:    Height as of this encounter: 1.854 m (6' 1\").    Weight as of this encounter: 66.2 kg (146 lb).  Healthy Weight (BMI 18.5-24.9)    - No history of Diabetes Mellitus    HEME  - VTE Low Risk 0.5%             Total Score: 2    VTE: Male      - No history of abnormal bleeding or antiplatelet use.    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   ~ denies residual symptoms    MSK  - symptomatic severe Scheuermann's kyphosis and  thoracic disc herniations without myelopathy.    ~ above procedure scheduled   ~ preoperative labs today    Patient is NOT Frail             Total Score: 0        Different anesthesia methods/types have been discussed with the patient, but they are aware that the final plan will be decided by the assigned anesthesia provider on the date of service.    The patient is optimized for their procedure. AVS with information on surgery time/arrival time, meds and NPO status given by nursing staff. No further diagnostic testing indicated.      On the day of service:     Prep time: 16 minutes  Visit time: 17 minutes  Documentation time: 7 minutes  ------------------------------------------  Total time: 40 minutes      AAMIR May CNP  Preoperative Assessment Center  [facility]  Clinic and Surgery Center  Phone: [phone]  Fax: [phone]

## 2024-04-24 NOTE — LETTER
4/24/2024         RE: Hitesh Dobson  [address]  [place] [address]        Dear Colleague,    Thank you for referring your patient, Hitesh Dobson, to the [facility] [place]. Please see a copy of my visit note below.    Orthopedic spine surgery clinic note     CC: Surgical preoperative discussion     HISTORY OF PRESENT ILLNESS:  Hitesh is a 19-year-old male presenting for follow-up to answer any questions before his posterior spinal fusion - planned for 5/21/24.  Patient last seen 10/12/2023.  At that time, given amount of deformity and severity of symptoms had an extensive discussion regarding possible surgical intervention.  He wished to proceed with follow up in month prior to answer questions.    He is here today with his mother, Jen. They drove from [place], [place]. 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 He reports his back and hip pain have worsened since last visit. He actually feels better when being active and mainly experiences pain at rest. He has occasional pain at night. He does not take medication for pain. LUIS today is 24%.      Patient denies any radicular symptoms or pain.  Denies any bowel or bladder changes.  Denies any sensory changes.  Denies any ataxia or loss of coordination in his upper or lower extremities.     PAST MEDICAL HISTORY:  He is the product of a normal pregnancy and delivery.  He did have a nuchal cord.  He met normal milestones for growth and development.  He has 1 brother who has no current issues.  The brother is a younger brother.  He has a maternal grandfather who had a curved back, but exact details are not clear.     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  Activity wise, he has been active in basketball, volleyball, and wrestling.  He is not currently on  competitive, but only intramural type activities in college.     PHYSICAL EXAMINATION:  Reveals a well-developed, well-nourished male in no acute distress.  Nonantalgic, nonmyelopathic gait.  Able to toe walk, able to tandem walk.  His head is centered over his pelvis.  Shoulders and pelvis are level. He has marked increased thoracic kyphosis.  He has a mild scoliotic deformity angle of trunk rotation, 5 degrees in the thoracic spine and 5 degrees in the lumbar spine on Seymour's forward bending test.  Lower extremity reflexes are 2+ and symmetric at the ankles.  Of note, he has difficulty relaxing completely for patellar reflexes.  2+ left patella, right hard to elicit. 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 No sensory changes when testing sensation longitudinally along thoracic region of the spine bilaterally.  Manual muscle testing is intact for foot dorsiflexion, plantarflexion, knee extension, hip flexion 5/5 strength.     IMAGING:  Radiographic exam performed today includes AP and lateral full spine performed on EOS.  The lateral radiographic films of the key, which shows a stable thoracic kyphosis of approximately 74 degrees.    T2-12 74 degrees  PI 54 degrees  L1-S1 80 degrees     MRI of the thoracic spine does show anterior wedging of the thoracic vertebrae and Schmorl nodes consistent with diagnosis of Scheuermann's kyphosis.  Patient also multiple mild posterior disc herniations throughout the thoracic spine with subtle effacement of the cord.                          ASSESSMENT:  Scheuermann's kyphosis, symptomatic, severe. 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     PLAN: We again reviewed his diagnosis and the proposed surgery.  Given thoracic spine disc herniations and early cord effacement, recommend to be done in the next 1-2 years rather than wait indefinitely.  Anticipate this would be from T3-L1 with Smith-Gallo osteotomies T6 through T12. The surgery will probably take about 4.5 hours,  approximately 600 mL of blood loss, and a 5-day length of stay.  We talked about the hospitalization and then the recuperation.  I would expect he would be out of school for 6 weeks and then back to sports at 3 months.  He asked good questions about return to activity.  We anticipate he will regain almost all of his strength.  There is a small chance he will not have quite as much flexibility as he once had, unclear if he will be able to still do back flips.     We reviewed the specific risks of surgery.  The risks include but are not limited to the risks of general anesthesia including death, heart attack, stroke, blood clot, pneumonia, and bladder infection.  Surgical risks include but are not limited to bleeding requiring a blood transfusion.  We would use tranexamic acid and a Cell Saver.  Infection:  He will be treated with prophylactic antibiotics in intra-wound antibiotics.  Nerve damage including possible paralysis.  We will use spinal cord monitoring.  Failure to heal, requiring reoperation.  Hardware problems.  We will use intraoperative O-arm and Stealth for placing the pedicle screws.  Finally, with Scheuermann's kyphosis, we can get a great radiographic result, but the patients can still have pain or symptoms.  The majority of patients are happy with their improvement, but there are risks of problems above or below wherever the fusion stops.  The challenge is that there is no nonoperative treatment for this and it is likely to alter the significant deformity and the fact that he is maximally extending his lumbar spine shows that he is at the edge of ability to compensate for the significant deformity.  He does meet the Barbosa criteria, wedging of 3 adjacent vertebral or more.   We will see him back for surgery in May.  He was given our contact information for any questions that arise.  He wishes to proceed with surgery.   Patient expressed understanding and is in agreement with this plan. All  questions answered.     Patient seen and discussed with Dr. Torres.    Angelita Terrence, PGY-4    I saw and evaluated the patient and developed the plan.  I reviewed his imaging with him.  Specifically his lumbar extension film equals a standing lumbar film in terms of lordosis indicating that he is used up all of his spinal reserve in order to be able to stand up.  He has about a 20 to 25 degree kyphosis beyond what would be expected given his pelvic incidence.  I showed him a representative example of a similar case.  This is strong desire to proceed at this point in time.  He had a series of questions that we reviewed in terms of duration of surgery and return to work progression.    My total contact time for this visit including image ordering, independent image interpretation, face-to-face evaluation, documentation was greater than 45 minutes.    Jayson Torres MD

## 2024-04-24 NOTE — NURSING NOTE
"Reason For Visit:   Chief Complaint   Patient presents with    RECHECK     Return to discuss surgery / DOS 5/21/24       Primary MD: No Ref-Primary, Physician  Ref. MD: EST    ?  No  Occupation Student.     Date of injury: no  Type of injury: no.     Date of surgery: no  Type of surgery: no.     Smoker: No  Request smoking cessation information: No    Ht 1.835 m (6' 0.24\")   Wt 67.1 kg (148 lb)   BMI 19.94 kg/m      Pain Assessment  Patient Currently in Pain: Yes  0-10 Pain Scale: 3  Primary Pain Location: Back    Oswestry (LUIS) Questionnaire        4/24/2024    10:34 AM   OSWESTRY DISABILITY INDEX   Count 9   Sum 11   Oswestry Score (%) 24.44 %        Visual Analog Pain Scale  Back Pain Scale 0-10: 2.5  Right leg pain: 0  Left leg pain: 0  Neck Pain Scale 0-10: 0  Right arm pain: 0  Left arm pain: 0    Promis 10 Assessment        4/24/2024    10:36 AM   PROMIS 10   In general, would you say your health is: Very good   In general, would you say your quality of life is: Good   In general, how would you rate your physical health? Good   In general, how would you rate your mental health, including your mood and your ability to think? Good   In general, how would you rate your satisfaction with your social activities and relationships? Good   In general, please rate how well you carry out your usual social activities and roles Good   To what extent are you able to carry out your everyday physical activities such as walking, climbing stairs, carrying groceries, or moving a chair? Completely   In the past 7 days, how often have you been bothered by emotional problems such as feeling anxious, depressed, or irritable? Rarely   In the past 7 days, how would you rate your fatigue on average? Mild   In the past 7 days, how would you rate your pain on average, where 0 means no pain, and 10 means worst imaginable pain? 6   In general, would you say your health is: 4   In general, would you say your quality of life " is: 3   In general, how would you rate your physical health? 3   In general, how would you rate your mental health, including your mood and your ability to think? 3   In general, how would you rate your satisfaction with your social activities and relationships? 3   In general, please rate how well you carry out your usual social activities and roles. (This includes activities at home, at work and in your community, and responsibilities as a parent, child, spouse, employee, friend, etc.) 3   To what extent are you able to carry out your everyday physical activities such as walking, climbing stairs, carrying groceries, or moving a chair? 5   In the past 7 days, how often have you been bothered by emotional problems such as feeling anxious, depressed, or irritable? 2   In the past 7 days, how would you rate your fatigue on average? 2   In the past 7 days, how would you rate your pain on average, where 0 means no pain, and 10 means worst imaginable pain? 6   Global Mental Health Score 13   Global Physical Health Score 15   PROMIS TOTAL - SUBSCORES 28                Kimberly Reed LPN

## 2024-04-25 NOTE — PROGRESS NOTES
Teaching Flowsheet   Relevant Diagnosis: SPINE   Teaching Topic: PREOP     Person(s) involved in teaching:   Patient and Mother     Motivation Level:  Asks Questions: Yes  Eager to Learn: Yes  Cooperative: Yes  Receptive (willing/able to accept information): Yes  Any cultural factors/Moravian beliefs that may influence understanding or compliance? No       Patient and Family demonstrates understanding of the following:  Reason for the appointment, diagnosis and treatment plan: Yes  Knowledge of proper use of medications and conditions for which they are ordered (with special attention to potential side effects or drug interactions): Yes  Which situations necessitate calling provider and whom to contact: Yes       Teaching Concerns Addressed:        Proper use and care of meds (medical equip, care aids, etc.): Yes  Nutritional needs and diet plan: Yes  Pain management techniques: Yes  Wound Care: Yes  How and/when to access community resources: Yes     Instructional Materials Used/Given: preop pkt     Time spent with patient: 15 minutes.

## 2024-05-21 ENCOUNTER — APPOINTMENT (OUTPATIENT)
Dept: GENERAL RADIOLOGY | Facility: CLINIC | Age: 19
End: 2024-05-21
Attending: ORTHOPAEDIC SURGERY
Payer: COMMERCIAL

## 2024-05-21 ENCOUNTER — ANESTHESIA (OUTPATIENT)
Dept: SURGERY | Facility: CLINIC | Age: 19
End: 2024-05-21
Payer: COMMERCIAL

## 2024-05-21 ENCOUNTER — HOSPITAL ENCOUNTER (INPATIENT)
Facility: CLINIC | Age: 19
LOS: 6 days | Discharge: HOME | End: 2024-05-27
Attending: ORTHOPAEDIC SURGERY | Admitting: ORTHOPAEDIC SURGERY
Payer: COMMERCIAL

## 2024-05-21 DIAGNOSIS — Z98.1 S/P FUSION OF THORACIC SPINE: Primary | ICD-10-CM

## 2024-05-21 LAB
BASE EXCESS BLDA CALC-SCNC: -1.3 MMOL/L (ref -3–3)
CA-I BLD-MCNC: 4.9 MG/DL (ref 4.4–5.2)
GLUCOSE BLD-MCNC: 107 MG/DL (ref 70–99)
GLUCOSE BLDC GLUCOMTR-MCNC: 92 MG/DL (ref 70–99)
HCO3 BLDA-SCNC: 26 MMOL/L (ref 21–28)
HGB BLD-MCNC: 13.3 G/DL (ref 13.3–17.7)
LACTATE BLD-SCNC: 0.6 MMOL/L (ref 0.7–2)
O2/TOTAL GAS SETTING VFR VENT: 50 %
OXYHGB MFR BLDA: 99 % (ref 92–100)
PCO2 BLDA: 50 MM HG (ref 35–45)
PH BLDA: 7.32 [PH] (ref 7.35–7.45)
PO2 BLDA: 234 MM HG (ref 80–105)
POTASSIUM BLD-SCNC: 4.4 MMOL/L (ref 3.4–5.3)
SAO2 % BLDA: 100 % (ref 96–97)
SODIUM BLD-SCNC: 140 MMOL/L (ref 135–145)

## 2024-05-21 PROCEDURE — 272N000001 HC OR GENERAL SUPPLY STERILE: Performed by: ORTHOPAEDIC SURGERY

## 2024-05-21 PROCEDURE — 258N000003 HC RX IP 258 OP 636: Performed by: STUDENT IN AN ORGANIZED HEALTH CARE EDUCATION/TRAINING PROGRAM

## 2024-05-21 PROCEDURE — 250N000009 HC RX 250: Performed by: STUDENT IN AN ORGANIZED HEALTH CARE EDUCATION/TRAINING PROGRAM

## 2024-05-21 PROCEDURE — 22802 ARTHRD PST DFRM 7-12 VRT SGM: CPT | Performed by: ORTHOPAEDIC SURGERY

## 2024-05-21 PROCEDURE — 999N000180 XR SURGERY CARM FLUORO LESS THAN 5 MIN: Mod: TC

## 2024-05-21 PROCEDURE — 250N000013 HC RX MED GY IP 250 OP 250 PS 637: Performed by: PHYSICIAN ASSISTANT

## 2024-05-21 PROCEDURE — 250N000011 HC RX IP 250 OP 636: Performed by: ORTHOPAEDIC SURGERY

## 2024-05-21 PROCEDURE — P9045 ALBUMIN (HUMAN), 5%, 250 ML: HCPCS | Mod: JZ | Performed by: NURSE ANESTHETIST, CERTIFIED REGISTERED

## 2024-05-21 PROCEDURE — 0RGA071 FUSION OF THORACOLUMBAR VERTEBRAL JOINT WITH AUTOLOGOUS TISSUE SUBSTITUTE, POSTERIOR APPROACH, POSTERIOR COLUMN, OPEN APPROACH: ICD-10-PCS | Performed by: ORTHOPAEDIC SURGERY

## 2024-05-21 PROCEDURE — 999N000141 HC STATISTIC PRE-PROCEDURE NURSING ASSESSMENT: Performed by: ORTHOPAEDIC SURGERY

## 2024-05-21 PROCEDURE — 00NX0ZZ RELEASE THORACIC SPINAL CORD, OPEN APPROACH: ICD-10-PCS | Performed by: ORTHOPAEDIC SURGERY

## 2024-05-21 PROCEDURE — 258N000003 HC RX IP 258 OP 636

## 2024-05-21 PROCEDURE — 22610 ARTHRD PST TQ 1NTRSPC THRC: CPT | Performed by: NURSE ANESTHETIST, CERTIFIED REGISTERED

## 2024-05-21 PROCEDURE — 20936 SP BONE AGRFT LOCAL ADD-ON: CPT | Performed by: ORTHOPAEDIC SURGERY

## 2024-05-21 PROCEDURE — 999N000065 XR CHEST PORT 1 VIEW

## 2024-05-21 PROCEDURE — 250N000009 HC RX 250: Performed by: ANESTHESIOLOGY

## 2024-05-21 PROCEDURE — 250N000025 HC SEVOFLURANE, PER MIN: Performed by: ORTHOPAEDIC SURGERY

## 2024-05-21 PROCEDURE — 250N000011 HC RX IP 250 OP 636

## 2024-05-21 PROCEDURE — 250N000011 HC RX IP 250 OP 636: Performed by: STUDENT IN AN ORGANIZED HEALTH CARE EDUCATION/TRAINING PROGRAM

## 2024-05-21 PROCEDURE — 4A1034Z MONITORING OF CENTRAL NERVOUS ELECTRICAL ACTIVITY, PERCUTANEOUS APPROACH: ICD-10-PCS | Performed by: ORTHOPAEDIC SURGERY

## 2024-05-21 PROCEDURE — 250N000009 HC RX 250: Performed by: ORTHOPAEDIC SURGERY

## 2024-05-21 PROCEDURE — 250N000011 HC RX IP 250 OP 636: Mod: JZ | Performed by: STUDENT IN AN ORGANIZED HEALTH CARE EDUCATION/TRAINING PROGRAM

## 2024-05-21 PROCEDURE — 99207 PR NO BILLABLE SERVICE THIS VISIT: CPT | Performed by: PEDIATRICS

## 2024-05-21 PROCEDURE — 203N000001 HC R&B PICU UMMC

## 2024-05-21 PROCEDURE — 258N000003 HC RX IP 258 OP 636: Performed by: NURSE ANESTHETIST, CERTIFIED REGISTERED

## 2024-05-21 PROCEDURE — 250N000009 HC RX 250: Performed by: NURSE ANESTHETIST, CERTIFIED REGISTERED

## 2024-05-21 PROCEDURE — 250N000011 HC RX IP 250 OP 636: Mod: JZ | Performed by: ANESTHESIOLOGY

## 2024-05-21 PROCEDURE — C1762 CONN TISS, HUMAN(INC FASCIA): HCPCS | Performed by: ORTHOPAEDIC SURGERY

## 2024-05-21 PROCEDURE — 22899 UNLISTED PROCEDURE SPINE: CPT | Performed by: ORTHOPAEDIC SURGERY

## 2024-05-21 PROCEDURE — 370N000017 HC ANESTHESIA TECHNICAL FEE, PER MIN: Performed by: ORTHOPAEDIC SURGERY

## 2024-05-21 PROCEDURE — 20930 SP BONE ALGRFT MORSEL ADD-ON: CPT | Performed by: ORTHOPAEDIC SURGERY

## 2024-05-21 PROCEDURE — 71045 X-RAY EXAM CHEST 1 VIEW: CPT | Mod: 26 | Performed by: RADIOLOGY

## 2024-05-21 PROCEDURE — 0RG8071 FUSION OF 8 OR MORE THORACIC VERTEBRAL JOINTS WITH AUTOLOGOUS TISSUE SUBSTITUTE, POSTERIOR APPROACH, POSTERIOR COLUMN, OPEN APPROACH: ICD-10-PCS | Performed by: ORTHOPAEDIC SURGERY

## 2024-05-21 PROCEDURE — 82805 BLOOD GASES W/O2 SATURATION: CPT

## 2024-05-21 PROCEDURE — 22610 ARTHRD PST TQ 1NTRSPC THRC: CPT | Performed by: ANESTHESIOLOGY

## 2024-05-21 PROCEDURE — 0RG60AJ FUSION OF THORACIC VERTEBRAL JOINT WITH INTERBODY FUSION DEVICE, POSTERIOR APPROACH, ANTERIOR COLUMN, OPEN APPROACH: ICD-10-PCS | Performed by: ORTHOPAEDIC SURGERY

## 2024-05-21 PROCEDURE — 258N000003 HC RX IP 258 OP 636: Performed by: ORTHOPAEDIC SURGERY

## 2024-05-21 PROCEDURE — 22212 INCIS 1 VERTEBRAL SEG THORAC: CPT | Performed by: ORTHOPAEDIC SURGERY

## 2024-05-21 PROCEDURE — 258N000003 HC RX IP 258 OP 636: Performed by: PHYSICIAN ASSISTANT

## 2024-05-21 PROCEDURE — 22843 INSERT SPINE FIXATION DEVICE: CPT | Performed by: ORTHOPAEDIC SURGERY

## 2024-05-21 PROCEDURE — C1713 ANCHOR/SCREW BN/BN,TIS/BN: HCPCS | Performed by: ORTHOPAEDIC SURGERY

## 2024-05-21 PROCEDURE — 0PS404Z REPOSITION THORACIC VERTEBRA WITH INTERNAL FIXATION DEVICE, OPEN APPROACH: ICD-10-PCS | Performed by: ORTHOPAEDIC SURGERY

## 2024-05-21 PROCEDURE — 22853 INSJ BIOMECHANICAL DEVICE: CPT | Performed by: ORTHOPAEDIC SURGERY

## 2024-05-21 PROCEDURE — 99292 CRITICAL CARE ADDL 30 MIN: CPT | Mod: GC | Performed by: PEDIATRICS

## 2024-05-21 PROCEDURE — 99291 CRITICAL CARE FIRST HOUR: CPT | Mod: AI | Performed by: PEDIATRICS

## 2024-05-21 PROCEDURE — 360N000086 HC SURGERY LEVEL 6 W/ FLUORO, PER MIN: Performed by: ORTHOPAEDIC SURGERY

## 2024-05-21 PROCEDURE — 250N000011 HC RX IP 250 OP 636: Performed by: ANESTHESIOLOGY

## 2024-05-21 PROCEDURE — 250N000011 HC RX IP 250 OP 636: Performed by: PHYSICIAN ASSISTANT

## 2024-05-21 PROCEDURE — 61783 SCAN PROC SPINAL: CPT | Performed by: ORTHOPAEDIC SURGERY

## 2024-05-21 PROCEDURE — 250N000009 HC RX 250: Performed by: PHYSICIAN ASSISTANT

## 2024-05-21 PROCEDURE — 22216 INCIS ADDL SPINE SEGMENT: CPT | Performed by: ORTHOPAEDIC SURGERY

## 2024-05-21 PROCEDURE — 250N000011 HC RX IP 250 OP 636: Mod: JZ | Performed by: NURSE ANESTHETIST, CERTIFIED REGISTERED

## 2024-05-21 DEVICE — IMP SCR MEDT 5.5/6.0MM SOLERA 5.5X35MM MA 55840005535: Type: IMPLANTABLE DEVICE | Site: SPINE THORACIC | Status: FUNCTIONAL

## 2024-05-21 DEVICE — IMP SCR MEDT 5.5/6.0MM SOLERA 5.0X45MM MA 55840005045: Type: IMPLANTABLE DEVICE | Site: SPINE THORACIC | Status: FUNCTIONAL

## 2024-05-21 DEVICE — IMP SCR MEDT 5.5/6.0MM SOLERA 6.5X45MM MA 55840006545: Type: IMPLANTABLE DEVICE | Site: SPINE THORACIC | Status: FUNCTIONAL

## 2024-05-21 DEVICE — IMP SCR MEDT 5.5/6.0MM SOLERA 4.5X35MM MA 55840004535: Type: IMPLANTABLE DEVICE | Site: SPINE THORACIC | Status: FUNCTIONAL

## 2024-05-21 DEVICE — SPACER X1217017 8X18MM 0 DEG CAP CNTRL
Type: IMPLANTABLE DEVICE | Site: SPINE THORACIC | Status: FUNCTIONAL
Brand: CAPSTONE CONTROL™ SPINAL SYSTEM

## 2024-05-21 DEVICE — IMP SCR MEDT 5.5/6.0MM SOLERA 5.5X40MM MA 55840005540: Type: IMPLANTABLE DEVICE | Site: SPINE THORACIC | Status: FUNCTIONAL

## 2024-05-21 DEVICE — IMP SCR MEDT 5.5/6.0MM SOLERA 4.5X40MM MA 55840004540: Type: IMPLANTABLE DEVICE | Site: SPINE THORACIC | Status: FUNCTIONAL

## 2024-05-21 DEVICE — IMP SCR MEDT 5.5/6.0MM SOLERA 6.5X40MM MA 55840006540: Type: IMPLANTABLE DEVICE | Site: SPINE THORACIC | Status: FUNCTIONAL

## 2024-05-21 DEVICE — GRAFT BN CANC 30CC CRSH 1-10MM 800104: Type: IMPLANTABLE DEVICE | Site: SPINE THORACIC | Status: FUNCTIONAL

## 2024-05-21 DEVICE — IMP SCR MEDT 5.5/6.0MM SOLERA 5.0X40MM MA 55840005040: Type: IMPLANTABLE DEVICE | Site: SPINE THORACIC | Status: FUNCTIONAL

## 2024-05-21 DEVICE — IMP SCR MEDT 5.5/6.0MM SOLERA 4.5X30MM MA 55840004530: Type: IMPLANTABLE DEVICE | Site: SPINE THORACIC | Status: FUNCTIONAL

## 2024-05-21 DEVICE — IMP SCR MEDT 5.5/6.0MM SOLERA 5.5X45MM MA 55840005545: Type: IMPLANTABLE DEVICE | Site: SPINE THORACIC | Status: FUNCTIONAL

## 2024-05-21 DEVICE — IMP SCR MEDT 5.5/6.0MM SOLERA 5.5X30MM MA 55840005530: Type: IMPLANTABLE DEVICE | Site: SPINE THORACIC | Status: FUNCTIONAL

## 2024-05-21 DEVICE — IMP SCR SET MEDT SOLERA BREAK OFF 5.5MM TI 5540030: Type: IMPLANTABLE DEVICE | Site: SPINE THORACIC | Status: FUNCTIONAL

## 2024-05-21 RX ORDER — LIDOCAINE 40 MG/G
CREAM TOPICAL
Status: DISCONTINUED | OUTPATIENT
Start: 2024-05-21 | End: 2024-05-27 | Stop reason: HOSPADM

## 2024-05-21 RX ORDER — PHENYLEPHRINE HCL IN 0.9% NACL 50MG/250ML
0.3 PLASTIC BAG, INJECTION (ML) INTRAVENOUS CONTINUOUS
Status: DISCONTINUED | OUTPATIENT
Start: 2024-05-21 | End: 2024-05-22

## 2024-05-21 RX ORDER — MORPHINE SULFATE 2 MG/ML
4 INJECTION, SOLUTION INTRAMUSCULAR; INTRAVENOUS EVERY 4 HOURS
Status: DISCONTINUED | OUTPATIENT
Start: 2024-05-21 | End: 2024-05-21

## 2024-05-21 RX ORDER — DIAZEPAM 10 MG/2ML
2.5 INJECTION, SOLUTION INTRAMUSCULAR; INTRAVENOUS ONCE
Status: COMPLETED | OUTPATIENT
Start: 2024-05-22 | End: 2024-05-22

## 2024-05-21 RX ORDER — LIDOCAINE HYDROCHLORIDE ANHYDROUS AND DEXTROSE MONOHYDRATE .8; 5 G/100ML; G/100ML
0.5 INJECTION, SOLUTION INTRAVENOUS CONTINUOUS
Status: DISCONTINUED | OUTPATIENT
Start: 2024-05-21 | End: 2024-05-22

## 2024-05-21 RX ORDER — SODIUM CHLORIDE 9 MG/ML
INJECTION, SOLUTION INTRAVENOUS CONTINUOUS
Status: DISCONTINUED | OUTPATIENT
Start: 2024-05-21 | End: 2024-05-22

## 2024-05-21 RX ORDER — SENNOSIDES 8.6 MG
8.6 TABLET ORAL 2 TIMES DAILY PRN
Status: DISCONTINUED | OUTPATIENT
Start: 2024-05-21 | End: 2024-05-23

## 2024-05-21 RX ORDER — NALOXONE HYDROCHLORIDE 0.4 MG/ML
0.4 INJECTION, SOLUTION INTRAMUSCULAR; INTRAVENOUS; SUBCUTANEOUS
Status: DISCONTINUED | OUTPATIENT
Start: 2024-05-21 | End: 2024-05-27 | Stop reason: HOSPADM

## 2024-05-21 RX ORDER — HYDROMORPHONE HYDROCHLORIDE 1 MG/ML
0.2 INJECTION, SOLUTION INTRAMUSCULAR; INTRAVENOUS; SUBCUTANEOUS EVERY 5 MIN PRN
Status: CANCELLED | OUTPATIENT
Start: 2024-05-21

## 2024-05-21 RX ORDER — SCOLOPAMINE TRANSDERMAL SYSTEM 1 MG/1
1 PATCH, EXTENDED RELEASE TRANSDERMAL ONCE
Status: COMPLETED | OUTPATIENT
Start: 2024-05-21 | End: 2024-05-22

## 2024-05-21 RX ORDER — SODIUM CHLORIDE, SODIUM LACTATE, POTASSIUM CHLORIDE, CALCIUM CHLORIDE 600; 310; 30; 20 MG/100ML; MG/100ML; MG/100ML; MG/100ML
INJECTION, SOLUTION INTRAVENOUS CONTINUOUS PRN
Status: DISCONTINUED | OUTPATIENT
Start: 2024-05-21 | End: 2024-05-21

## 2024-05-21 RX ORDER — NALOXONE HYDROCHLORIDE 0.4 MG/ML
0.4 INJECTION, SOLUTION INTRAMUSCULAR; INTRAVENOUS; SUBCUTANEOUS
Status: DISCONTINUED | OUTPATIENT
Start: 2024-05-21 | End: 2024-05-23

## 2024-05-21 RX ORDER — NALOXONE HYDROCHLORIDE 0.4 MG/ML
0.2 INJECTION, SOLUTION INTRAMUSCULAR; INTRAVENOUS; SUBCUTANEOUS
Status: DISCONTINUED | OUTPATIENT
Start: 2024-05-21 | End: 2024-05-27 | Stop reason: HOSPADM

## 2024-05-21 RX ORDER — LIDOCAINE 40 MG/G
CREAM TOPICAL
Status: DISCONTINUED | OUTPATIENT
Start: 2024-05-21 | End: 2024-05-23

## 2024-05-21 RX ORDER — PROPOFOL 10 MG/ML
INJECTION, EMULSION INTRAVENOUS PRN
Status: DISCONTINUED | OUTPATIENT
Start: 2024-05-21 | End: 2024-05-21

## 2024-05-21 RX ORDER — GABAPENTIN 100 MG/1
300 CAPSULE ORAL
Status: COMPLETED | OUTPATIENT
Start: 2024-05-21 | End: 2024-05-21

## 2024-05-21 RX ORDER — VANCOMYCIN HYDROCHLORIDE 1 G/20ML
INJECTION, POWDER, LYOPHILIZED, FOR SOLUTION INTRAVENOUS PRN
Status: DISCONTINUED | OUTPATIENT
Start: 2024-05-21 | End: 2024-05-21 | Stop reason: HOSPADM

## 2024-05-21 RX ORDER — PHENYLEPHRINE HCL IN 0.9% NACL 50MG/250ML
.1-6 PLASTIC BAG, INJECTION (ML) INTRAVENOUS CONTINUOUS
Status: DISCONTINUED | OUTPATIENT
Start: 2024-05-21 | End: 2024-05-21 | Stop reason: HOSPADM

## 2024-05-21 RX ORDER — POLYETHYLENE GLYCOL 3350 17 G/17G
17 POWDER, FOR SOLUTION ORAL DAILY
Status: DISCONTINUED | OUTPATIENT
Start: 2024-05-21 | End: 2024-05-23

## 2024-05-21 RX ORDER — OXYCODONE HYDROCHLORIDE 5 MG/1
5 TABLET ORAL
Status: CANCELLED | OUTPATIENT
Start: 2024-05-21

## 2024-05-21 RX ORDER — MORPHINE SULFATE 2 MG/ML
2 INJECTION, SOLUTION INTRAMUSCULAR; INTRAVENOUS EVERY 4 HOURS PRN
Status: DISCONTINUED | OUTPATIENT
Start: 2024-05-21 | End: 2024-05-21

## 2024-05-21 RX ORDER — CEFAZOLIN SODIUM 2 G/100ML
2 INJECTION, SOLUTION INTRAVENOUS EVERY 8 HOURS
Status: DISCONTINUED | OUTPATIENT
Start: 2024-05-21 | End: 2024-05-23

## 2024-05-21 RX ORDER — ONDANSETRON 2 MG/ML
4 INJECTION INTRAMUSCULAR; INTRAVENOUS EVERY 30 MIN PRN
Status: CANCELLED | OUTPATIENT
Start: 2024-05-21

## 2024-05-21 RX ORDER — SODIUM CHLORIDE, SODIUM GLUCONATE, SODIUM ACETATE, POTASSIUM CHLORIDE AND MAGNESIUM CHLORIDE 526; 502; 368; 37; 30 MG/100ML; MG/100ML; MG/100ML; MG/100ML; MG/100ML
INJECTION, SOLUTION INTRAVENOUS CONTINUOUS PRN
Status: DISCONTINUED | OUTPATIENT
Start: 2024-05-21 | End: 2024-05-21

## 2024-05-21 RX ORDER — NALOXONE HYDROCHLORIDE 0.4 MG/ML
0.1 INJECTION, SOLUTION INTRAMUSCULAR; INTRAVENOUS; SUBCUTANEOUS
Status: CANCELLED | OUTPATIENT
Start: 2024-05-21

## 2024-05-21 RX ORDER — CEFAZOLIN SODIUM 2 G/100ML
2 INJECTION, SOLUTION INTRAVENOUS EVERY 6 HOURS
Status: DISCONTINUED | OUTPATIENT
Start: 2024-05-21 | End: 2024-05-21

## 2024-05-21 RX ORDER — CEFAZOLIN SODIUM 2 G/100ML
2 INJECTION, SOLUTION INTRAVENOUS EVERY 8 HOURS
Status: DISCONTINUED | OUTPATIENT
Start: 2024-05-21 | End: 2024-05-21

## 2024-05-21 RX ORDER — LIDOCAINE HYDROCHLORIDE 20 MG/ML
INJECTION, SOLUTION INFILTRATION; PERINEURAL PRN
Status: DISCONTINUED | OUTPATIENT
Start: 2024-05-21 | End: 2024-05-21

## 2024-05-21 RX ORDER — FENTANYL CITRATE 50 UG/ML
50 INJECTION, SOLUTION INTRAMUSCULAR; INTRAVENOUS EVERY 5 MIN PRN
Status: CANCELLED | OUTPATIENT
Start: 2024-05-21

## 2024-05-21 RX ORDER — DIAZEPAM 10 MG/2ML
2.5 INJECTION, SOLUTION INTRAMUSCULAR; INTRAVENOUS
Status: CANCELLED | OUTPATIENT
Start: 2024-05-21

## 2024-05-21 RX ORDER — DIAZEPAM 10 MG/2ML
2.5 INJECTION, SOLUTION INTRAMUSCULAR; INTRAVENOUS EVERY 6 HOURS PRN
Status: DISCONTINUED | OUTPATIENT
Start: 2024-05-21 | End: 2024-05-21

## 2024-05-21 RX ORDER — LIDOCAINE HYDROCHLORIDE ANHYDROUS AND DEXTROSE MONOHYDRATE .8; 5 G/100ML; G/100ML
1 INJECTION, SOLUTION INTRAVENOUS CONTINUOUS
Status: DISCONTINUED | OUTPATIENT
Start: 2024-05-21 | End: 2024-05-21 | Stop reason: HOSPADM

## 2024-05-21 RX ORDER — FENTANYL CITRATE 50 UG/ML
INJECTION, SOLUTION INTRAMUSCULAR; INTRAVENOUS PRN
Status: DISCONTINUED | OUTPATIENT
Start: 2024-05-21 | End: 2024-05-21

## 2024-05-21 RX ORDER — ACETAMINOPHEN 10 MG/ML
1000 INJECTION, SOLUTION INTRAVENOUS EVERY 6 HOURS
Status: DISCONTINUED | OUTPATIENT
Start: 2024-05-21 | End: 2024-05-22

## 2024-05-21 RX ORDER — HEPARIN SODIUM,PORCINE 10 UNIT/ML
2-4 VIAL (ML) INTRAVENOUS EVERY 24 HOURS
Status: DISCONTINUED | OUTPATIENT
Start: 2024-05-21 | End: 2024-05-26

## 2024-05-21 RX ORDER — CEFAZOLIN SODIUM/WATER 2 G/20 ML
2 SYRINGE (ML) INTRAVENOUS
Status: COMPLETED | OUTPATIENT
Start: 2024-05-21 | End: 2024-05-21

## 2024-05-21 RX ORDER — CEFAZOLIN SODIUM/WATER 2 G/20 ML
2 SYRINGE (ML) INTRAVENOUS SEE ADMIN INSTRUCTIONS
Status: DISCONTINUED | OUTPATIENT
Start: 2024-05-21 | End: 2024-05-21 | Stop reason: HOSPADM

## 2024-05-21 RX ORDER — SODIUM CHLORIDE, SODIUM LACTATE, POTASSIUM CHLORIDE, CALCIUM CHLORIDE 600; 310; 30; 20 MG/100ML; MG/100ML; MG/100ML; MG/100ML
INJECTION, SOLUTION INTRAVENOUS CONTINUOUS
Status: CANCELLED | OUTPATIENT
Start: 2024-05-21

## 2024-05-21 RX ORDER — EPHEDRINE SULFATE 50 MG/ML
INJECTION, SOLUTION INTRAMUSCULAR; INTRAVENOUS; SUBCUTANEOUS PRN
Status: DISCONTINUED | OUTPATIENT
Start: 2024-05-21 | End: 2024-05-21

## 2024-05-21 RX ORDER — DEXAMETHASONE SODIUM PHOSPHATE 4 MG/ML
4 INJECTION, SOLUTION INTRA-ARTICULAR; INTRALESIONAL; INTRAMUSCULAR; INTRAVENOUS; SOFT TISSUE
Status: CANCELLED | OUTPATIENT
Start: 2024-05-21

## 2024-05-21 RX ORDER — ACETAMINOPHEN 325 MG/1
975 TABLET ORAL ONCE
Status: COMPLETED | OUTPATIENT
Start: 2024-05-21 | End: 2024-05-21

## 2024-05-21 RX ORDER — FENTANYL CITRATE 50 UG/ML
25 INJECTION, SOLUTION INTRAMUSCULAR; INTRAVENOUS EVERY 5 MIN PRN
Status: CANCELLED | OUTPATIENT
Start: 2024-05-21

## 2024-05-21 RX ORDER — SODIUM CHLORIDE 9 MG/ML
INJECTION, SOLUTION INTRAVENOUS CONTINUOUS
Status: DISCONTINUED | OUTPATIENT
Start: 2024-05-21 | End: 2024-05-27 | Stop reason: HOSPADM

## 2024-05-21 RX ORDER — ACETAMINOPHEN 325 MG/1
975 TABLET ORAL ONCE
Status: CANCELLED | OUTPATIENT
Start: 2024-05-21 | End: 2024-05-21

## 2024-05-21 RX ORDER — HEPARIN SODIUM,PORCINE/PF 10 UNIT/ML
1 SYRINGE (ML) INTRAVENOUS CONTINUOUS
Status: DISCONTINUED | OUTPATIENT
Start: 2024-05-21 | End: 2024-05-23

## 2024-05-21 RX ORDER — DEXAMETHASONE SODIUM PHOSPHATE 4 MG/ML
INJECTION, SOLUTION INTRA-ARTICULAR; INTRALESIONAL; INTRAMUSCULAR; INTRAVENOUS; SOFT TISSUE PRN
Status: DISCONTINUED | OUTPATIENT
Start: 2024-05-21 | End: 2024-05-21

## 2024-05-21 RX ORDER — ONDANSETRON 4 MG/1
4 TABLET, ORALLY DISINTEGRATING ORAL EVERY 30 MIN PRN
Status: CANCELLED | OUTPATIENT
Start: 2024-05-21

## 2024-05-21 RX ORDER — HYDROMORPHONE HYDROCHLORIDE 1 MG/ML
0.4 INJECTION, SOLUTION INTRAMUSCULAR; INTRAVENOUS; SUBCUTANEOUS EVERY 5 MIN PRN
Status: CANCELLED | OUTPATIENT
Start: 2024-05-21

## 2024-05-21 RX ORDER — HEPARIN SODIUM,PORCINE 10 UNIT/ML
2-4 VIAL (ML) INTRAVENOUS
Status: DISCONTINUED | OUTPATIENT
Start: 2024-05-21 | End: 2024-05-27 | Stop reason: HOSPADM

## 2024-05-21 RX ORDER — ONDANSETRON 2 MG/ML
INJECTION INTRAMUSCULAR; INTRAVENOUS PRN
Status: DISCONTINUED | OUTPATIENT
Start: 2024-05-21 | End: 2024-05-21

## 2024-05-21 RX ORDER — DIAZEPAM 10 MG/2ML
5 INJECTION, SOLUTION INTRAMUSCULAR; INTRAVENOUS EVERY 6 HOURS PRN
Status: DISCONTINUED | OUTPATIENT
Start: 2024-05-21 | End: 2024-05-22

## 2024-05-21 RX ORDER — DIAZEPAM 10 MG/2ML
5 INJECTION, SOLUTION INTRAMUSCULAR; INTRAVENOUS EVERY 6 HOURS PRN
Status: DISCONTINUED | OUTPATIENT
Start: 2024-05-21 | End: 2024-05-21

## 2024-05-21 RX ORDER — MORPHINE SULFATE 2 MG/ML
4 INJECTION, SOLUTION INTRAMUSCULAR; INTRAVENOUS EVERY 4 HOURS PRN
Status: DISCONTINUED | OUTPATIENT
Start: 2024-05-21 | End: 2024-05-21

## 2024-05-21 RX ORDER — OXYCODONE HYDROCHLORIDE 10 MG/1
10 TABLET ORAL
Status: CANCELLED | OUTPATIENT
Start: 2024-05-21

## 2024-05-21 RX ORDER — PROPOFOL 10 MG/ML
INJECTION, EMULSION INTRAVENOUS CONTINUOUS PRN
Status: DISCONTINUED | OUTPATIENT
Start: 2024-05-21 | End: 2024-05-21

## 2024-05-21 RX ORDER — HYDRALAZINE HYDROCHLORIDE 20 MG/ML
2.5-5 INJECTION INTRAMUSCULAR; INTRAVENOUS EVERY 10 MIN PRN
Status: CANCELLED | OUTPATIENT
Start: 2024-05-21

## 2024-05-21 RX ORDER — MORPHINE SULFATE 2 MG/ML
2 INJECTION, SOLUTION INTRAMUSCULAR; INTRAVENOUS EVERY 4 HOURS
Status: DISCONTINUED | OUTPATIENT
Start: 2024-05-21 | End: 2024-05-21

## 2024-05-21 RX ORDER — LABETALOL HYDROCHLORIDE 5 MG/ML
10 INJECTION, SOLUTION INTRAVENOUS
Status: CANCELLED | OUTPATIENT
Start: 2024-05-21

## 2024-05-21 RX ADMIN — SCOPALAMINE 1 PATCH: 1 PATCH, EXTENDED RELEASE TRANSDERMAL at 07:31

## 2024-05-21 RX ADMIN — Medication 30 MG: at 08:49

## 2024-05-21 RX ADMIN — PHENYLEPHRINE HYDROCHLORIDE 100 MCG: 10 INJECTION INTRAVENOUS at 12:48

## 2024-05-21 RX ADMIN — PROPOFOL 40 MG: 10 INJECTION, EMULSION INTRAVENOUS at 08:49

## 2024-05-21 RX ADMIN — PHENYLEPHRINE HYDROCHLORIDE 50 MCG: 10 INJECTION INTRAVENOUS at 09:34

## 2024-05-21 RX ADMIN — FENTANYL CITRATE 25 MCG: 50 INJECTION INTRAMUSCULAR; INTRAVENOUS at 08:19

## 2024-05-21 RX ADMIN — PHENYLEPHRINE HYDROCHLORIDE 100 MCG: 10 INJECTION INTRAVENOUS at 14:06

## 2024-05-21 RX ADMIN — PHENYLEPHRINE HYDROCHLORIDE 100 MCG: 10 INJECTION INTRAVENOUS at 09:47

## 2024-05-21 RX ADMIN — SUCCINYLCHOLINE CHLORIDE 100 MG: 20 INJECTION, SOLUTION INTRAMUSCULAR; INTRAVENOUS; PARENTERAL at 08:04

## 2024-05-21 RX ADMIN — PHENYLEPHRINE HYDROCHLORIDE 50 MCG: 10 INJECTION INTRAVENOUS at 13:55

## 2024-05-21 RX ADMIN — DIAZEPAM 2.5 MG: 5 INJECTION INTRAMUSCULAR; INTRAVENOUS at 21:32

## 2024-05-21 RX ADMIN — SODIUM CHLORIDE, SODIUM GLUCONATE, SODIUM ACETATE, POTASSIUM CHLORIDE AND MAGNESIUM CHLORIDE: 526; 502; 368; 37; 30 INJECTION, SOLUTION INTRAVENOUS at 08:33

## 2024-05-21 RX ADMIN — PHENYLEPHRINE HYDROCHLORIDE 100 MCG: 10 INJECTION INTRAVENOUS at 12:59

## 2024-05-21 RX ADMIN — TRANEXAMIC ACID 10 MG/KG/HR: 100 INJECTION INTRAVENOUS at 08:54

## 2024-05-21 RX ADMIN — EPHEDRINE SULFATE 5 MG: 5 INJECTION INTRAVENOUS at 13:56

## 2024-05-21 RX ADMIN — LIDOCAINE HYDROCHLORIDE 1 MG/KG/HR: 8 INJECTION, SOLUTION INTRAVENOUS at 08:31

## 2024-05-21 RX ADMIN — EPHEDRINE SULFATE 5 MG: 5 INJECTION INTRAVENOUS at 14:03

## 2024-05-21 RX ADMIN — SODIUM CHLORIDE, SODIUM LACTATE, POTASSIUM CHLORIDE, CALCIUM CHLORIDE: 600; 310; 30; 20 INJECTION, SOLUTION INTRAVENOUS at 08:31

## 2024-05-21 RX ADMIN — SODIUM CHLORIDE: 9 INJECTION, SOLUTION INTRAVENOUS at 16:31

## 2024-05-21 RX ADMIN — GABAPENTIN 300 MG: 300 CAPSULE ORAL at 07:12

## 2024-05-21 RX ADMIN — Medication 0.6 MCG/KG/MIN: at 16:46

## 2024-05-21 RX ADMIN — FENTANYL CITRATE 50 MCG: 50 INJECTION INTRAMUSCULAR; INTRAVENOUS at 12:36

## 2024-05-21 RX ADMIN — FENTANYL CITRATE 50 MCG: 50 INJECTION INTRAMUSCULAR; INTRAVENOUS at 09:03

## 2024-05-21 RX ADMIN — ALBUMIN HUMAN: 0.05 INJECTION, SOLUTION INTRAVENOUS at 08:44

## 2024-05-21 RX ADMIN — PROPOFOL 30 MG: 10 INJECTION, EMULSION INTRAVENOUS at 10:20

## 2024-05-21 RX ADMIN — MORPHINE SULFATE 4 MG: 2 INJECTION, SOLUTION INTRAMUSCULAR; INTRAVENOUS at 16:38

## 2024-05-21 RX ADMIN — Medication: at 21:47

## 2024-05-21 RX ADMIN — LIDOCAINE HYDROCHLORIDE 80 MG: 20 INJECTION, SOLUTION INFILTRATION; PERINEURAL at 08:02

## 2024-05-21 RX ADMIN — Medication 0.3 MCG/KG/MIN: at 09:34

## 2024-05-21 RX ADMIN — ACETAMINOPHEN 1000 MG: 10 INJECTION INTRAVENOUS at 17:26

## 2024-05-21 RX ADMIN — HYDROMORPHONE HYDROCHLORIDE 0.5 MG: 1 INJECTION, SOLUTION INTRAMUSCULAR; INTRAVENOUS; SUBCUTANEOUS at 08:58

## 2024-05-21 RX ADMIN — PHENYLEPHRINE HYDROCHLORIDE 200 MCG: 10 INJECTION INTRAVENOUS at 13:51

## 2024-05-21 RX ADMIN — MIDAZOLAM 2 MG: 1 INJECTION INTRAMUSCULAR; INTRAVENOUS at 07:53

## 2024-05-21 RX ADMIN — PHENYLEPHRINE HYDROCHLORIDE 100 MCG: 10 INJECTION INTRAVENOUS at 14:10

## 2024-05-21 RX ADMIN — PHENYLEPHRINE HYDROCHLORIDE 100 MCG: 10 INJECTION INTRAVENOUS at 09:55

## 2024-05-21 RX ADMIN — ACETAMINOPHEN 1000 MG: 10 INJECTION INTRAVENOUS at 21:37

## 2024-05-21 RX ADMIN — FENTANYL CITRATE 50 MCG: 50 INJECTION INTRAMUSCULAR; INTRAVENOUS at 12:11

## 2024-05-21 RX ADMIN — FENTANYL CITRATE 50 MCG: 50 INJECTION INTRAMUSCULAR; INTRAVENOUS at 09:01

## 2024-05-21 RX ADMIN — EPHEDRINE SULFATE 5 MG: 5 INJECTION INTRAVENOUS at 14:06

## 2024-05-21 RX ADMIN — EPHEDRINE SULFATE 5 MG: 5 INJECTION INTRAVENOUS at 14:05

## 2024-05-21 RX ADMIN — TRANEXAMIC ACID 2013 MG: 100 INJECTION INTRAVENOUS at 08:31

## 2024-05-21 RX ADMIN — MORPHINE SULFATE 2 MG: 2 INJECTION, SOLUTION INTRAMUSCULAR; INTRAVENOUS at 15:25

## 2024-05-21 RX ADMIN — FENTANYL CITRATE 25 MCG: 50 INJECTION INTRAMUSCULAR; INTRAVENOUS at 08:35

## 2024-05-21 RX ADMIN — FENTANYL CITRATE 50 MCG: 50 INJECTION INTRAMUSCULAR; INTRAVENOUS at 09:08

## 2024-05-21 RX ADMIN — FENTANYL CITRATE 50 MCG: 50 INJECTION INTRAMUSCULAR; INTRAVENOUS at 09:07

## 2024-05-21 RX ADMIN — PHENYLEPHRINE HYDROCHLORIDE 100 MCG: 10 INJECTION INTRAVENOUS at 14:03

## 2024-05-21 RX ADMIN — HEPARIN, PORCINE (PF) 10 UNIT/ML INTRAVENOUS SYRINGE 4 ML: at 18:05

## 2024-05-21 RX ADMIN — Medication 2 G: at 12:31

## 2024-05-21 RX ADMIN — SODIUM CHLORIDE, POTASSIUM CHLORIDE, SODIUM LACTATE AND CALCIUM CHLORIDE: 600; 310; 30; 20 INJECTION, SOLUTION INTRAVENOUS at 07:53

## 2024-05-21 RX ADMIN — MORPHINE SULFATE 2 MG: 2 INJECTION, SOLUTION INTRAMUSCULAR; INTRAVENOUS at 16:14

## 2024-05-21 RX ADMIN — PHENYLEPHRINE HYDROCHLORIDE 100 MCG: 10 INJECTION INTRAVENOUS at 13:14

## 2024-05-21 RX ADMIN — PHENYLEPHRINE HYDROCHLORIDE 100 MCG: 10 INJECTION INTRAVENOUS at 13:57

## 2024-05-21 RX ADMIN — PHENYLEPHRINE HYDROCHLORIDE 100 MCG: 10 INJECTION INTRAVENOUS at 12:46

## 2024-05-21 RX ADMIN — PROPOFOL 140 MG: 10 INJECTION, EMULSION INTRAVENOUS at 08:02

## 2024-05-21 RX ADMIN — Medication 2 G: at 08:31

## 2024-05-21 RX ADMIN — CEFAZOLIN SODIUM 2 G: 2 INJECTION, SOLUTION INTRAVENOUS at 19:48

## 2024-05-21 RX ADMIN — PHENYLEPHRINE HYDROCHLORIDE 100 MCG: 10 INJECTION INTRAVENOUS at 12:51

## 2024-05-21 RX ADMIN — EPHEDRINE SULFATE 5 MG: 5 INJECTION INTRAVENOUS at 14:00

## 2024-05-21 RX ADMIN — SODIUM CHLORIDE: 9 INJECTION, SOLUTION INTRAVENOUS at 19:44

## 2024-05-21 RX ADMIN — DEXAMETHASONE SODIUM PHOSPHATE 6 MG: 4 INJECTION, SOLUTION INTRA-ARTICULAR; INTRALESIONAL; INTRAMUSCULAR; INTRAVENOUS; SOFT TISSUE at 08:10

## 2024-05-21 RX ADMIN — FENTANYL CITRATE 1 MCG/KG/HR: 50 INJECTION, SOLUTION INTRAMUSCULAR; INTRAVENOUS at 08:31

## 2024-05-21 RX ADMIN — ACETAMINOPHEN 975 MG: 325 TABLET, FILM COATED ORAL at 07:12

## 2024-05-21 RX ADMIN — PHENYLEPHRINE HYDROCHLORIDE 100 MCG: 10 INJECTION INTRAVENOUS at 14:08

## 2024-05-21 RX ADMIN — HYDROMORPHONE HYDROCHLORIDE 0.5 MG: 1 INJECTION, SOLUTION INTRAMUSCULAR; INTRAVENOUS; SUBCUTANEOUS at 09:09

## 2024-05-21 RX ADMIN — PROPOFOL 100 MCG/KG/MIN: 10 INJECTION, EMULSION INTRAVENOUS at 08:31

## 2024-05-21 RX ADMIN — FENTANYL CITRATE 50 MCG: 50 INJECTION INTRAMUSCULAR; INTRAVENOUS at 08:45

## 2024-05-21 RX ADMIN — LIDOCAINE HYDROCHLORIDE 0.5 MG/KG/HR: 8 INJECTION, SOLUTION INTRAVENOUS at 17:48

## 2024-05-21 RX ADMIN — Medication: at 18:57

## 2024-05-21 RX ADMIN — ONDANSETRON 4 MG: 2 INJECTION INTRAMUSCULAR; INTRAVENOUS at 12:42

## 2024-05-21 RX ADMIN — SODIUM CHLORIDE, SODIUM GLUCONATE, SODIUM ACETATE, POTASSIUM CHLORIDE AND MAGNESIUM CHLORIDE: 526; 502; 368; 37; 30 INJECTION, SOLUTION INTRAVENOUS at 12:04

## 2024-05-21 RX ADMIN — PROPOFOL 30 MG: 10 INJECTION, EMULSION INTRAVENOUS at 09:09

## 2024-05-21 ASSESSMENT — ACTIVITIES OF DAILY LIVING (ADL)
ADLS_ACUITY_SCORE: 18
VISION_MANAGEMENT: GLASSES
TOILETING_ISSUES: NO
ADLS_ACUITY_SCORE: 25
ADLS_ACUITY_SCORE: 18
DOING_ERRANDS_INDEPENDENTLY_DIFFICULTY: NO
DIFFICULTY_COMMUNICATING: NO
ADLS_ACUITY_SCORE: 18
ADLS_ACUITY_SCORE: 21
ADLS_ACUITY_SCORE: 16
ADLS_ACUITY_SCORE: 18
ADLS_ACUITY_SCORE: 23
DIFFICULTY_EATING/SWALLOWING: NO
ADLS_ACUITY_SCORE: 23
WALKING_OR_CLIMBING_STAIRS_DIFFICULTY: NO
CONCENTRATING,_REMEMBERING_OR_MAKING_DECISIONS_DIFFICULTY: NO
ADLS_ACUITY_SCORE: 18
ADLS_ACUITY_SCORE: 23
WEAR_GLASSES_OR_BLIND: YES
ADLS_ACUITY_SCORE: 18
FALL_HISTORY_WITHIN_LAST_SIX_MONTHS: NO
ADLS_ACUITY_SCORE: 18
DRESSING/BATHING_DIFFICULTY: NO
ADLS_ACUITY_SCORE: 18
ADLS_ACUITY_SCORE: 18
ADLS_ACUITY_SCORE: 23
CHANGE_IN_FUNCTIONAL_STATUS_SINCE_ONSET_OF_CURRENT_ILLNESS/INJURY: NO

## 2024-05-21 NOTE — BRIEF OP NOTE
Brief Operative Note    Preop Dx:   Scheuermann's kyphosis [M42.00]  Thoracic disc herniation [M51.24]  Post op Dx:   Same  Procedure:    Procedure(s):  Posterior Spinal Fusion Thoracic 3-Lumbar 1, Segmental Instrumentation, Hernandez Gallo Osteotomies Thoracic 6-11, thoracic 8-9 discetomy, transforaminal interbody fusion, insert intervertebral device  Surgeon:     Dr. Torres  Assistants:    Pilo Craft,  Spine Fellow   Anesthesia:   General  EBL:    450 mL with 1250 mL returned via cellsaver  Total IV Fluids:  See Anesthesia Record  Specimens:   None  Findings:   See Operative Dictation      Assessment and Plan: Hitesh Dobson is a 19 year old male with PMH including scheuermann's kyphosis, thoracic disc herniation now s/p PISF T3-L1 w/ SPOs T6-11 8-9 TTIF with Dr. Torres on 5/21/24.     MAPs > 85 24 hr postop due to intra-op neuro monitoring changes   Admit to PICU postop    Ortho (Spine) Primary  Activity:   - Up with assist until independent. No excessive bending or twisting. No lifting >10 lbs x 6 weeks.   - If Lucie lift required for mobilization, please use high-back sling rather than universal sling. This is to minimize spine flexion.  Weight bearing status: WBAT.  Pain management:   - 1mg/kg IV lidocaine: discontinue at 8am on POD#2 or earlier if complications arise.  - Transition from IV to PO narcotics as tolerated. No NSAIDs x 3 months.   Antibiotics: Ancef 2g Q8hrs until deep drain removed  Diet: Begin with clear fluids and progress diet as tolerated.   DVT prophylaxis: SCDs only. No chemical DVT ppx needed.  Imaging: XR scoli PTDC - ordered.  Labs: Hgb POD#1,2.  Bracing/Splinting: None.  Dressings: Keep Aquacel c/d/i x 7 days.  Drains: Document output per shift, will be discontinued at Orthopedic Surgery discretion.  Sumner catheter: Remove POD#1,2.  Physical Therapy/Occupational Therapy: Eval and treat.  Cultures: none.    Consults: Consult IP team when able to discharge from ICU   Follow-up:  Clinic with Dr. Torres in 6 weeks with repeat x-rays.   Disposition: Pending progress with therapies, pain control on orals, and medical stability, anticipate discharge to home on POD #5-7.        The procedure was medically necessary for an assistant. My assistance was necessary for patient positioning, prepping and draping, soft tissue retraction, bone graft preparation and placement, and closure. The assistance that I provided reduced operative time which meant less general anesthetic for the patient.    Pilo Craft, DO  Spine Fellow       Implants:   Implant Name Type Inv. Item Serial No.  Lot No. LRB No. Used Action   IMP SCR SET MEDT SOLERA BREAK OFF 5.5MM TI [id number] - [id number] Metallic Hardware/[place] IMP SCR SET MEDT SOLERA BREAK OFF 5.5MM TI [id number]  [employer]  N/A 22 Implanted   IMP SCR MEDT 5.5/6.0MM SOLERA 5.5X45MM MA [id number] - [id number] Metallic Hardware/[place] IMP SCR MEDT 5.5/6.0MM SOLERA 5.5X45MM MA [id number]  [employer]  N/A 6 Implanted   IMP SCR MEDT 5.5/6.0MM SOLERA 5.0X45MM MA [id number] - [id number] Metallic Hardware/[place] IMP SCR MEDT 5.5/6.0MM SOLERA 5.0X45MM MA [id number]  [employer]  N/A 1 Implanted   IMP SCR MEDT 5.5/6.0MM SOLERA 6.5X45MM MA [id number] - [id number] Metallic Hardware/[place] IMP SCR MEDT 5.5/6.0MM SOLERA 6.5X45MM MA [id number]  [employer]  N/A 6 Implanted   IMP SCR MEDT 5.5/6.0MM SOLERA 6.5X40MM MA [id number] - [id number] Metallic Hardware/[place] IMP SCR MEDT 5.5/6.0MM SOLERA 6.5X40MM MA [id number]  [employer]  N/A 1 Implanted   IMP SCR MEDT 5.5/6.0MM SOLERA 5.5X30MM MA [id number] - [id number] Metallic Hardware/[place] IMP SCR MEDT 5.5/6.0MM SOLERA 5.5X30MM MA [id number]  [employer]  N/A 1 Implanted   IMP SCR MEDT 5.5/6.0MM SOLERA 4.5X30MM MA [id number] - [id number] Metallic Hardware/[place] IMP SCR MEDT 5.5/6.0MM SOLERA 4.5X30MM MA [id number]  [employer]  N/A 1 Implanted   IMP SCR MEDT 5.5/6.0MM SOLERA 4.5X35MM MA  [id number] - [id number] Metallic Hardware/[place] IMP SCR MEDT 5.5/6.0MM SOLERA 4.5X35MM MA [id number]  [employer]  N/A 1 Implanted   IMP SCR MEDT 5.5/6.0MM SOLERA 5.5X35MM MA [id number] - [id number] Metallic Hardware/[place] IMP SCR MEDT 5.5/6.0MM SOLERA 5.5X35MM MA [id number]  [employer]  N/A 1 Implanted   IMP SCR MEDT 5.5/6.0MM SOLERA 4.5X40MM MA [id number] - [id number] Metallic Hardware/[place] IMP SCR MEDT 5.5/6.0MM SOLERA 4.5X40MM MA [id number]  [employer]  N/A 1 Implanted   IMP SCR MEDT 5.5/6.0MM SOLERA 5.5X40MM MA [id number] - [id number] Metallic Hardware/[place] IMP SCR MEDT 5.5/6.0MM SOLERA 5.5X40MM MA [id number]  [employer]  N/A 2 Implanted   IMP SCR MEDT 5.5/6.0MM SOLERA 5.0X40MM MA [id number] - [id number] Metallic Hardware/[place] IMP SCR MEDT 5.5/6.0MM SOLERA 5.0X40MM MA [id number]  [employer]  N/A 1 Implanted   SPACER CAPSTONE 0DEG 8X18MM POLYMERIC FUSION [id number] - [id number] Metallic Hardware/[place] SPACER CAPSTONE 0DEG 8X18MM POLYMERIC FUSION [id number]  [employer] 57LH N/A 1 Implanted   IMP HANG MEDT SOLERA STR LINED [id number] CHR [id number] - [id number] Metallic Hardware/[place] IMP HANG MEDT SOLERA STR LINED [id number] CHR [id number]  [employer]  N/A 2 Implanted   IMP HANG MEDT SOLERA STR LINED [id number] CHR [id number] - [id number] Metallic Hardware/[place] IMP HANG MEDT SOLERA STR LINED [id number] CHR [id number]  [employer]  N/A 1 [place]asted   GRAFT BN CANC 30CC CRSH 1-10MM [id number] - [id number] Bone/Tissue/Biologic GRAFT BN CANC 30CC CRSH 1-10MM [id number] [employer]  N/A 1 Implanted   GRAFT [address] 1-10MM [id number] - [id number] Bone/Tissue/Biologic GRAFT [address] 1-10MM [id number] [employer]  N/A 1 Implanted

## 2024-05-21 NOTE — OP NOTE
Preoperative diagnosis:   Scheuermann's kyphosis  Thoracic disc herniation thoracic 8-9    Postoperative diagnosis:  Scheuermann's kyphosis  Thoracic disc herniation thoracic 8-9    Operation performed:  1.  Posterior spinal fusion T3-L1.  2.  Segmental spinal instrumentation T3-L1.  3.  Hernandez-Gallo osteotomies T6-11.  4.  Transforaminal thoracic interbody fusion T8-9.  5.  Insertion intervertebral device T8-9.  6.  Image guided surgery.  7.  Local autogenous bone graft spine only.  8.  Allograft bone graft.    Surgeon: Jayson Torres MD.      Assistants: Pilo Craft DO and Wendi Joseph PA-C.  They were necessary for assistance with positioning, exposure, instrumentation, osteotomies, bone grafting, closure.  No qualified residents were available.    Indication for operation: Patient is a 19-year-old male with Scheuerman's kyphosis.  He meets the Barbosa criteria of wedging of 3 adjacent vertebra of 5 degrees or more.  His thoracic kyphosis exceeds that which would be expected given his lumbar lordosis of 55 degrees.  His thoracic kyphosis measures 80 degrees from T2-T12.  Preoperative MRI showed that there was spinal cord shape change and apparent thoracic disc herniations at T8-9 and possibly T9-10 although T8-9 was the most severe focal alteration.  We had several extended discussions about risk benefits alternative treatments and expected outcomes.  It was his strong desire to proceed with surgical intervention.  Of note is his preoperative neurologic exam on the morning of surgery was normal including manual muscle testing for hip flexion, knee extension, foot dorsi plantarflexion, great toe extension.  Sensation was intact to light touch in the L3, L4, L5, S1 dermatomes.  The patient had consented to participate in live streaming of his surgery to engineers and members of the [employer] team to advance their understanding of the surgical undertaking.  In addition he agreed to be a participant in the  Medtronic Ailliance study which is a standard of care study looking at the utilization of Medtronic implants.    Description of technical procedures used: The patient was brought to the operating room and underwent the induction of an adequate general anesthesia.  Martin-Wells tongs were placed an arterial line and peripheral IVs were placed.  He was then turned and positioned prone on the Trios table fourposter frame and 10 pounds of weight were applied to the Martin-Wells tongs.  He was prepared and draped in usual sterile fashion.  A timeout was done confirming the site and type of surgery.  He did receive prophylactic antibiotics, tranexamic acid, IV lidocaine drip and TIVA anesthesia.  Baseline neuromonitoring tracings were evaluable.    Working simultaneously Dr. Craft and I exposed the spine.  Intraoperative imaging was used to confirm the appropriate level of dissection.  Upon completion of the exposure neuromonitoring tracings were again obtained.  In unexplainable fashion there was left-sided adductor and proximal leg weakness with persistence of bilateral foot normal tracings.  In consultation with the neurologist remotely monitoring we sought an explanation for this and none was clear.  I indicated that I thought perhaps this could be from the thoracic spinal cord compression at T8-9 and therefore altered the plan to perform the T8-9 decompression and transforaminal thoracic interbody fusion in order to try to minimize this.  In addition we took the usual steps associated with neuromonitoring changes with lightening the anesthetic technique and increasing blood pressure.  I asked the anesthesia team to be prepared to do a wake-up test.    Reference frame was attached to the L1 spinous process and intraoperative 3D images were obtained with the O-arm in transfer the Stealth image guided workstation.  We used this to place pedicle screws at T8-T9 and T10 in preparation for this decompression and  transforaminal thoracic interbody fusion.  The screws were placed using navigation by utilizing a navigated probe to identify the start point, Midas Reji bur to create a cortical defect, navigated drill to create the tract followed by navigated taps and navigated screw placement.  All screws were from the Medtronic 5.5/6.0 Solera system.  These were titanium screws with cobalt chrome heads.  At T8 we placed 5.5 x 45 bilaterally.  T9 left 5.0 x 45 right 5.5 x 45.  T10 left 6.5 x 45 right 5.5 x 45.    I resected the inferior articular facets of T8 along with the spinous process and of T9.  I now took down the ligamentum flavum at T8 and the superior articular facets of T9 bilaterally completing a Hernandez-Gallo osteotomy at this level.  I then entered the disc space utilizing a narrow osteotome under lateral fluoroscopic guidance.  I then utilized a 3-0 Sabrina downgoing curette to take down the disc material and a small pituitary rongeur to progressively remove it.  An 8 mm insert and rotate distractor was used to elevate this up.  We now entered the disc space on the contralateral side and used an 8 mm 0 degree trial to elevate this side up.  I progressively took down more disc material on from the left side and put the insert rotate distractors back in.  Dr. Craft then took down disc material on the right side and then replaced the insert and rotate distractors.  I took down the posterior longitudinal ligament to the midline and then could palpate with a Tanya 3 across this space and there was no apparent remaining disc bulge.  We then utilized the Medtronic peek capstone control 8 mm high 0 degree lordotic 18 mm long implant.  This was filled with local autogenous bone graft and seated into place.    During this time course with a combination of decreasing the anesthesia, increasing the blood pressure the tracings improved.  I was preparing to do spinal cord mapping however the recommendation from the  neuromonitoring neurologist was against this as it was his interpretation this would not provided any additional information.  Along the course of this time.  There was improvement in the neuromonitoring tracings although they were not quite back to baseline.    We now proceeded to finish placing the pedicle screws across this navigated segment.  At T11 we placed 6.5 x 45 left and 5.5 x 45 right.  T12 6.5 x 40 left 6.5 x 45 right.  L1 6.5 x 45 bilaterally.  2D and 3D imaging was obtained to confirm optimal positioning.  We now used a screw based reference frame to place pedicle screws in the remainder of the planned instrumented segment.  At T3 we placed 5.5 x 30 left 4.5 x 30 right.  T4 4.5 x 35 left 5.5 x 35 right.  T5 4.5 x 40 left 5.5 x 40 right.  T6 left 5.0 x 40 left 5.5 x 40 right.  T7 6.5 x 45 left 5.5 x 45 right.  Again 2D and 3D images were obtained confirming optimal positioning.  Neurologic monitoring did not significantly change in this interval.    We now completed Hernandez-Gallo osteotomies at T6-7, T7-8, T9-10, T10-11.  This was done by resecting the spinous process, the inferior articular facets, the ligamentum flavum, the superior articular facets.  The inferior articular facets of T 11-12 were resected for fusion.    Preoperative patient-specific rods had been ordered.  The plan was for achieving 55 degrees of overall thoracic kyphosis.  The plan has been established to under bend the carole by 10 degrees.  However when I looked at the rods that were delivered they were over bent by 10 degrees.  Therefore I selected to 6.0 millimeter cobalt chrome rods, measured the same length as the patient-specific rods cut them and then bent them to be under bent by about 10 degrees.  An intraoperative photograph was obtained of the bent rods next to a ruler to allow analysis of on bending of the rods.    We now placed the left-sided carole starting in the center out fashion at T8 then T9 and T7 and then T10 then T6  then T11 then T5 and T12 then T4 I intentionally left out the set plugs at T3 and L1 as to not stress the and screws.  Given that the patient had mild left thoracic scoliosis I applied compression on the left side from T11-12, T10-11, T9-10, T8-9, T7-8, T6-7, T5-6, T4-5.  This appeared to give both good coronal and sagittal plane alignment.  The set plugs at L1 and T3 were loosely seated into place.  The right-sided carole was seated in a similar centripetal center out fashion.  Intraoperative 2D lung films were obtained which showed good alignment consistent with the patient's specific plan.  It appeared that we had achieved the target alignment of approximately 55 degrees.  At this point neuromonitoring had returned to baseline.  We had intentionally maintained the mean arterial pressures greater than 90 mm.  The wound was irrigated out with 1 L of normal saline.        The posterior elements were decorticated from T3-L1.  Posterior spinal fusion was performed utilizing local autogenous bone graft and 60 cc of crushed cancellous allograft bone.  The set plugs were torqued off in accordance with the manufacture specification.  1 g of vancomycin powder was distributed into the wound locally.    Prior to initiation of wound closure neuromonitoring tracings had now returned back to baseline.  My plan at this point was to see if we could decrease the mean arterial pressure to the 80 range and still have normal tracings.  This was done and the tracings were normal.    We now initiated wound closure.  Interrupted #1 absorbable Vicryl sutures were used to reapproximate the thoracolumbar fascia and reattach it to the remaining spinous processes.  A #1 vicryl was used to oversew this.  An 8 inch drain was placed superficial and brought out proximally on the left side.  Deep horizontal mattresses followed by subcutaneous and then intradermal sutures were applied.  Benzoin Steri-Strips and an Aquacel dressing were  applied.    After the key and critical portions of this case were completed I then went and spoke to the patient's family.  I gave the mother and father pictures of the intraoperative compared to the preoperative imaging.  I then went and spoke to the pediatric ICU team to sign him out with the goal of maintaining his mean arterial pressures above 85 mmHg overnight.  The plan will then be to remove the pressors tomorrow morning and observe his neurologic status.    Estimated blood loss for this case was 450 cc with 150 cc of Cell Saver return and an estimated blood volume of 4700 cc.    Jayson Torres MD

## 2024-05-21 NOTE — ANESTHESIA CARE TRANSFER NOTE
Patient: Hitesh Dobson    Procedure: Procedure(s):  Posterior Spinal Fusion Thoracic 3-Lumbar 1, Segmental Instrumentation, Hernandez Gallo Osteotomies Thoracic 6-11, thoracic 8-9 discetomy, transforaminal interbody fusion, insert intervertebral device       Diagnosis: Scheuermann's kyphosis [M42.00]  Thoracic disc herniation [M51.24]  Diagnosis Additional Information: No value filed.    Anesthesia Type:   General     Note:    Oropharynx: oropharynx clear of all foreign objects  Level of Consciousness: awake  Oxygen Supplementation: face mask  Level of Supplemental Oxygen (L/min / FiO2): 6  Independent Airway: airway patency satisfactory and stable  Dentition: dentition unchanged  Vital Signs Stable: post-procedure vital signs reviewed and stable  Report to RN Given: handoff report given  Patient transferred to: ICU    ICU Handoff: Call for PAUSE to initiate/utilize ICU HANDOFF, Identified Patient, Identified Responsible Provider, Reviewed the Pertinent Medical History, Discussed Surgical Course, Reviewed Intra-OP Anesthesia Management and Issues during Anesthesia, Set Expectations for Post Procedure Period and Allowed Opportunity for Questions and Acknowledgement of Understanding      Vitals:  Vitals Value Taken Time   BP     Temp 36.7    Pulse 54 05/21/24 1442   Resp     SpO2 97 % 05/21/24 1442   Vitals shown include unfiled device data.    Electronically Signed By: AAMIR Perez CRNA  May 21, 2024  2:43 PM

## 2024-05-21 NOTE — PLAN OF CARE
Goal Outcome Evaluation:    Plan of Care Reviewed With: patient    Overall Patient Progress: improvingOverall Patient Progress: improving    Pt returned from OR at 1500. MAP goal > 85. Initially on phenylephrine, now weaned off. Lidocaine gtt infusing at 0.5 mg/kg/hr. No S&S of toxicity. HR 45-100s. MD aware of low rates. LS clear; good saturations on room air. Neuro checks WDL. CXR to verify triple lumen line; okay for use. Spinal dressing CDI. Accordion drain with no output yet. Sumner in place with adequate output. Pt with high pain post-op. Morphine given per MAR with little relief; MD ordered PCA pump. Awaiting medication from pharmacy. All questions answered and patient oriented to room. Mom and dad at bedside and updated on plan of care.

## 2024-05-21 NOTE — PROGRESS NOTES
"Orthopaedic Surgery Progress Note:       Subjective:   Seen and examined in PICU postop    Objective:   /54 (BP Location: Right arm)   Pulse 73   Temp 98.1  F (36.7  C) (Axillary)   Resp 12   Ht 1.854 m (6' 1\")   Wt 67.2 kg (148 lb 2.4 oz)   SpO2 96%   BMI 19.55 kg/m    I/O this shift:  In: -   Out: 350 [Urine:350]  Gen: NAD. Resting comfortably in bed  Resp: Breathing comfortably on RA  : Sumner in place    Superficial HV drain in place  Dressing cdi       Lumbar Spine:    Appearance - No gross stepoffs or deformities    Motor -     L2-3: Hip flexion 5/5 R and 5/5 L strength          L3/4:  Knee extension R 5/5 and L 5/5 strength         L4/5:  Foot dorsiflexion R 5/5 L 5/5 and       EHL dorsiflexion R 5/5 L 5/5 strength         S1:  Plantarflexion/Peroneal Muscles  R 5/5 and L 5/5 strength    Sensation: intact to light touch L3-S1 distribution BLE        Labs:  Lab Results   Component Value Date    WBC 5.8 04/24/2024    HGB 13.3 05/21/2024     04/24/2024     Assessment and Plan: Hitesh Dobson is a 19 year old male with PMH including scheuermann's kyphosis, thoracic disc herniation now s/p PISF T3-L1 w/ SPOs T6-11 8-9 TTIF with Dr. Torres on 5/21/24.      MAPs > 85 24 hr postop due to intra-op neuro monitoring changes   Admit to PICU postop     Ortho (Spine) Primary  Activity:   - Up with assist until independent. No excessive bending or twisting. No lifting >10 lbs x 6 weeks.   - If Lucie lift required for mobilization, please use high-back sling rather than universal sling. This is to minimize spine flexion.  Weight bearing status: WBAT.  Pain management:   - 1mg/kg IV lidocaine: discontinue at 8am on POD#2 or earlier if complications arise.  - Transition from IV to PO narcotics as tolerated. No NSAIDs x 3 months.   Antibiotics: Ancef 2g Q8hrs until deep drain removed  Diet: Begin with clear fluids and progress diet as tolerated.   DVT prophylaxis: SCDs only. No chemical DVT ppx " needed.  Imaging: XR scoli PTDC - ordered.  Labs: Hgb POD#1,2.  Bracing/Splinting: None.  Dressings: Keep Aquacel c/d/i x 7 days.  Drains: Document output per shift, will be discontinued at Orthopedic Surgery discretion.  Sumner catheter: Remove POD#1,2.  Physical Therapy/Occupational Therapy: Eval and treat.  Cultures: none.    Consults: Consult IP team when able to discharge from ICU   Follow-up: Clinic with Dr. Torres in 6 weeks with repeat x-rays.   Disposition: Pending progress with therapies, pain control on orals, and medical stability, anticipate discharge to home on POD #5-7.    Pilo Craft DO  Spine Fellow

## 2024-05-21 NOTE — ANESTHESIA PROCEDURE NOTES
Arterial Line Procedure Note    Pre-Procedure   Staff -        Performed By: anesthesiologist       Location: OR       Pre-Anesthestic Checklist: patient identified, IV checked, risks and benefits discussed, informed consent, monitors and equipment checked, pre-op evaluation and at physician/surgeon's request  Timeout:       Correct Patient: Yes        Correct Procedure: Yes        Correct Site: Yes        Correct Position: Yes   Line Placement:   This line was placed Post Induction starting at 5/21/2024 8:11 AM and ending at 5/21/2024 8:19 AM  Procedure   Procedure: arterial line       Diagnosis: Scheuermann's Kyphosis Thoracic Disc Herniation       Laterality: left       Insertion Site: radial.  Sterile Prep        Standard elements of sterile barrier followed       Skin prep: Chloraprep  Insertion/Injection        Technique: Seldinger Technique        Catheter Type/Size: 20 G, 1.75 in/4.5 cm quick cath (integral wire)  Narrative        Tegaderm dressing used.       Complications: None apparent,        Arterial waveform: Yes        IBP within 10% of NIBP: Yes   Comments:  Atraumatic x 2 attempts.  Sterile technique.  Good waveform and correlation.

## 2024-05-21 NOTE — ANESTHESIA PREPROCEDURE EVALUATION
Pre-Operative H & P     CC:  Preoperative exam to assess for increased cardiopulmonary risk while undergoing surgery and anesthesia.    Date of Encounter: 4/24/2024  Primary Care Physician:  No Ref-Primary, Physician     Reason for visit:   No diagnosis found.      ALE Dobson is a 19 year old male who presents for pre-operative H & P in preparation for  Procedure Information       Case: [id number] Date/Time: 05/21/24 0800    Procedure: Posterior Spinal Fusion Thoracic 3-Lumbar 1, Segmental Instrumentation, Hernandez Gallo Osteotomies Thoracic 6-11 (Spine)    Anesthesia type: General    Diagnosis:       Scheuermann's kyphosis [M42.00]      Thoracic disc herniation [M51.24]    Pre-op diagnosis:       Scheuermann's kyphosis [M42.00]      Thoracic disc herniation [M51.24]    [facility]: [facility]    Providers: Jayson Torres MD            The patient presents to the PAC in person today with his mom, Jen, in preparation for the above scheduled procedure with comorbid conditions including  Scheuermann's kyphosis and 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    The patient was seen by Dr. Torres in the orthopedic spine surgery clinic for symptomatic, severe Scheuermann's kyphosis and  thoracic disc herniations without myelopathy. The patient suffers from diffuse back pain in his neck, mid and low back, and hips.  Dr. Torres counseled the patient of the findings and treatment options.  The patient has now been scheduled for the procedure as listed above.      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       Past Surgical History  Past Surgical History:   Procedure Laterality  Date     EXTRACTION(S) DENTAL         Prior to Admission Medications  No current outpatient medications on file.       Allergies  Allergies   Allergen Reactions     Eye Drops Allergy Relief [Tetrahydrozoline-Zn Sulfate]      Other reaction(s): rhinitis       Social History  Social History     Socioeconomic History     Marital status: Single     Spouse name: Not on file     Number of children: Not on file     Years of education: Not on file     Highest education level: Not on file   Occupational History     Not on file   Tobacco Use     Smoking status: Never     Passive exposure: Never     Smokeless tobacco: Never   Substance and Sexual Activity     Alcohol use: Yes     Comment: 1 -2 drinks on wkd's 'not alway's'     Drug use: Not Currently     Types: Marijuana     Sexual activity: Not on file   Other Topics Concern     Not on file   Social History Narrative     Not on file     Social Determinants of Health     Financial Resource Strain: Not on file   Food Insecurity: Not on file   Transportation Needs: Not on file   Physical Activity: Not on file   Stress: Not on file   Social Connections: Not on file   Interpersonal Safety: Not on file   Housing Stability: Not on file       Family History  Family History   Problem Relation Age of Onset     Other - See Comments Father        Review of Systems  The complete review of systems is negative other than noted in the HPI or here.   Anesthesia Evaluation   Pt has not had prior anesthetic Type of anesthetic: [place] teeth.        ROS/MED HX  ENT/Pulmonary:  - neg pulmonary ROS     Neurologic:  - neg neurologic ROS     Cardiovascular:  - neg cardiovascular ROS     METS/Exercise Tolerance:  Comment: Active college student.  Goes to wellness center to work out daily.  Plays intermural basketball.    Hematologic:  - neg hematologic  ROS     Musculoskeletal:       GI/Hepatic:  - neg GI/hepatic ROS     Renal/Genitourinary:  - neg Renal ROS     Endo:  - neg endo ROS    "  Psychiatric/Substance Use:    (-) psychiatric history, alcohol abuse history and chronic opioid use history   Infectious Disease:  - neg infectious disease ROS     Malignancy:  - neg malignancy ROS     Other:  - neg other ROS    (+)  , H/O Chronic Pain,         /54 (BP Location: Right arm)   Pulse (!) 46   Temp 36.5  C (97.7  F) (Oral)   Resp 14   Ht 1.854 m (6' 1\")   Wt 67.2 kg (148 lb 2.4 oz)   SpO2 100%   BMI 19.55 kg/m      Physical Exam   Constitutional: Awake, alert, cooperative, no apparent distress, and appears stated age.  Eyes: Pupils equal, round and reactive to light, extra ocular muscles intact, sclera clear, conjunctiva normal.  HENT: Normocephalic, oral pharynx with moist mucus membranes, good dentition. No goiter appreciated.   Respiratory: Clear to auscultation bilaterally, no crackles or wheezing.  Cardiovascular: Regular rate and rhythm, normal S1 and S2, and no murmur noted.  Carotids +2, no bruits. No edema. Palpable pulses to radial  DP and PT arteries.   GI: Normal bowel sounds, soft, non-distended, non-tender, no masses palpated, no hepatosplenomegaly. Lymph/Hematologic: No cervical lymphadenopathy and no supraclavicular lymphadenopathy.  Skin: Warm and dry.  No rashes at anticipated surgical site.   Musculoskeletal: Full ROM of neck. There is no redness, warmth, or swelling of the joints. Gross motor strength is normal.  Evidence of thoracic kyphosis.   Neurologic: Awake, alert, oriented to name, place and time. Cranial nerves II-XII are grossly intact. Gait is normal.   Neuropsychiatric: Calm, cooperative. Normal affect.     Prior Labs/Diagnostic Studies   All labs and imaging personally reviewed   -COMPLETE BLOOD COUNT WITH DIFFERENTIAL  Specimen: Blood - Blood specimen (specimen)  Component  Ref Range & Units 4 yr ago   WBC  4.0 - 11.0 K/uL 8.5   RBC  4.30 - 5.50 M/uL 5.13   Hemoglobin  13.0 - 16.0 g/dL 14.8   Hematocrit  37.5 - 48.0 % 44.1   MCV  78.0 - 92.0 fL 86.0 "   MCH  25.0 - 34.0 pg 28.9   MCHC  33.0 - 37.0 g/dL 33.6   RDW-CV  11.5 - 15.5 % 12.3   Platelet Count  150 - 450 K/uL 201   MPV  8.5 - 12.0 fL 6.7 Low    Seg Neut Absolute  1.8 - 8.0 K/uL 7.5   Lymphocytes Absolute  1.0 - 5.0 K/uL 0.8 Low    Monocytes Absolute  0.0 - 1.0 K/uL 0.2   Neutrophils Abs. (Segs and Bands)  /uL 7,500   Neutrophils Percent  % 88.1   Lymphocytes Percent  % 9.0   Monocytes Percent  % 2.9   Resulting Agency [address]   Narrative  Performed by [address]    Seg Neut Absolute results contains cumulative values for # neutrophil, # eosinophil and # basophil.    Neutorphils Percent results contains cumulative values for % neutrophil, % eosinophil and % basophil.  Specimen Collected: 04/16/20 11:08 AM       PROCEDURES  Full body radiographs using EOS      History: Scheuermann's kyphosis                                                                      Impression:  1. Mild convex left curvature of the thoracic spine and mild convex  right curvature of the thoracolumbar spine.   2. No global sagittal or coronal imbalance.  3. Weight bearing axis as detailed above.     TALA ROBERSON MD   The patient's records and results personally reviewed by this provider.     Outside records reviewed from: Care Everywhere    LAB/DIAGNOSTIC STUDIES TODAY:     Latest Reference Range & Units 04/24/24 11:39   Sodium 135 - 145 mmol/L 141   Potassium 3.4 - 5.3 mmol/L 4.1   Chloride 98 - 107 mmol/L 105   Carbon Dioxide (CO2) 22 - 29 mmol/L 25   Urea Nitrogen 6.0 - 20.0 mg/dL 11.1   Creatinine 0.67 - 1.17 mg/dL 0.98   GFR Estimate >60 mL/min/1.73m2 >90   Calcium 8.6 - 10.0 mg/dL 9.6   Anion Gap 7 - 15 mmol/L 11   Glucose 70 - 99 mg/dL 82   WBC 4.0 - 11.0 10e3/uL 5.8   Hemoglobin 13.3 - 17.7 g/dL 14.4   Hematocrit 40.0 - 53.0 % 43.7   Platelet Count 150 - 450 10e3/uL 257   RBC Count 4.40 - 5.90 10e6/uL 4.78   MCV 78 - 100 fL 91   MCH 26.5 - 33.0 pg 30.1   MCHC 31.5 - 36.5 g/dL  "33.0   RDW 10.0 - 15.0 % 12.7       Assessment    Hitesh Dobson is a 19 year old male seen as a PAC referral for risk assessment and optimization for anesthesia.    Plan/Recommendations  Pt will be optimized for the proposed procedure.  See below for details on the assessment, risk, and preoperative recommendations    NEUROLOGY  - No history of TIA, CVA or seizure    - Chronic Pain/back pain  ~ morphine equivilant = None     -Post Op delirium risk factors:  No risk identified    ENT  - No current airway concerns.  Will need to be reassessed day of surgery.  Mallampati: I  TM: > 3    CARDIAC  - No history of CAD, Hypertension, and Afib    - METS (Metabolic Equivalents)  Patient performs 4 or more METS exercise without symptoms             Total Score: 0      - RCRI-Low risk: Class 2 0.9% complication rate             Total Score: 1    RCRI: High Risk Surgery        PULMONARY  - [place] Low Risk             Total Score: 1    [place]: Male      - Denies asthma or inhaler use    - Tobacco History    History   Smoking Status     Never   Smokeless Tobacco     Never       GI  - Denies h/o GERD    - PONV Medium Risk  Total Score: 2           1 AN PONV: Patient is not a current smoker    1 AN PONV: Intended Post Op Opioids        /RENAL  - Baseline Creatinine  see above     ENDOCRINE    - BMI: Estimated body mass index is 19.55 kg/m  as calculated from the following:    Height as of this encounter: 1.854 m (6' 1\").    Weight as of this encounter: 67.2 kg (148 lb 2.4 oz).  Healthy Weight (BMI 18.5-24.9)    - No history of Diabetes Mellitus    HEME  - VTE Low Risk 0.5%             Total Score: 2    VTE: Male      - No history of abnormal bleeding or antiplatelet use.    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   ~ denies residual symptoms    MSK  - symptomatic severe Scheuermann's kyphosis and  thoracic disc " herniations without myelopathy.    ~ above procedure scheduled   ~ preoperative labs today    Patient is NOT Frail             Total Score: 0        Different anesthesia methods/types have been discussed with the patient, but they are aware that the final plan will be decided by the assigned anesthesia provider on the date of service.    The patient is optimized for their procedure. AVS with information on surgery time/arrival time, meds and NPO status given by nursing staff. No further diagnostic testing indicated.      On the day of service:     Prep time: 16 minutes  Visit time: 17 minutes  Documentation time: 7 minutes  ------------------------------------------  Total time: 40 minutes      AAMIR May CNP  Preoperative Assessment Center  [facility]  Phone: [phone]  Fax: [phone]    Physical Exam    Airway        Mallampati: II   TM distance: > 3 FB   Neck ROM: full   Mouth opening: > 3 cm    Respiratory Devices and Support         Dental       (+) Completely normal teeth      Cardiovascular   cardiovascular exam normal          Pulmonary   pulmonary exam normal            Anesthesia Plan    ASA Status:  2    NPO Status:  NPO Appropriate    Anesthesia Type: General.     - Airway: ETT         Techniques and Equipment:     - Airway: Video-Laryngoscope     - Lines/Monitors: 2nd IV, Arterial Line, Central Line     - Drips/Meds: Fentanyl, Phenylephrine, Tranexamic acid     Consents    Anesthesia Plan(s) and associated risks, benefits, and realistic alternatives discussed. Questions answered and patient/representative(s) expressed understanding.     - Discussed: Risks, Benefits and Alternatives for BOTH SEDATION and the PROCEDURE were discussed     - Discussed with:  Patient, Parent (Mother and/or Father)      - Extended Intubation/Ventilatory Support Discussed: Yes.      - Patient is DNR/DNI Status: No     Use of blood products discussed: No .      Postoperative Care    Pain management: IV analgesics, Oral pain medications.   PONV prophylaxis: Ondansetron (or other 5HT-3), Dexamethasone or Solumedrol, Scopolamine patch, Background Propofol Infusion     Comments:    Other Comments: PAC Evaluation reviewed and appreciated.    Plan: GETA with infusions and lines noted above.    Dr. Lara Castro MD  Anesthesiology

## 2024-05-21 NOTE — ANESTHESIA PROCEDURE NOTES
Central Line/PA Catheter Placement    Pre-Procedure   Staff -        Performed By: anesthesiologist       Location: OR       Pre-Anesthestic Checklist: patient identified, IV checked, site marked, risks and benefits discussed, informed consent, monitors and equipment checked, pre-op evaluation and at physician/surgeon's request  Timeout:       Correct Patient: Yes        Correct Procedure: Yes        Correct Site: Yes        Correct Position: Yes        Correct Laterality: Yes   Line Placement:   This line was placed Post Induction starting at 5/21/2024 2:05 PM and ending at 5/21/2024 2:15 PM    Procedure   Procedure: central line       Diagnosis: Thoracic Disc Herniation & Scheuermann's Kyphosis       Laterality: right       Insertion Site: internal jugular.       Patient Position: Trendelenburg  Sterile Prep        All elements of maximal sterile barrier technique followed       Patient Prep/Sterile Barriers: draped, hand hygiene, gloves , hat , mask , draped, gown, sterile gel and probe cover       Skin prep: Chloraprep  Insertion/Injection        Technique: ultrasound guided        1. Ultrasound was used to evaluate the access site.       2. Vein evaluated via ultrasound for patency/adequacy.       3. Using real-time ultrasound the needle/catheter was observed entering the artery/vein.       5. The visualized structures were anatomically normal.       6. There were no apparent abnormal pathologic findings.       Type: CVC       Catheter Size: 7 Fr       Catheter Length: 20       Number of Lumens: triple lumen  Narrative         Secured by: suture       Biopatch and Tegaderm dressing used.       Complications: None apparent,        blood aspirated from all lumens,        All lumens flushed: Yes       Verification method: Placement to be verified post-op

## 2024-05-21 NOTE — ANESTHESIA PROCEDURE NOTES
Airway       Patient location during procedure: OR       Procedure Start/Stop Times: 5/21/2024 8:05 AM  Staff -        CRNA: Yana Goldstein APRN CRNA       Performed By: CRNAIndications and Patient Condition       Indications for airway management: solis-procedural       Induction type:intravenous       Mask difficulty assessment: 1 - vent by mask    Final Airway Details       Final airway type: endotracheal airway       Successful airway: ETT - single and Oral  Endotracheal Airway Details        ETT size (mm): 7.5       Cuffed: yes       Inital cuff pressure (cm H2O): 22       Successful intubation technique: video laryngoscopy       VL Blade Size: MAC 3       Grade View of Cords: 1       Adjucts: stylet       Position: Right       Measured from: lips       Secured at (cm): 23       Bite block used: None    Post intubation assessment        Placement verified by: capnometry, equal breath sounds and chest rise        Number of attempts at approach: 1       Number of other approaches attempted: 0       Secured with: tape       Ease of procedure: easy       Dentition: Intact and Unchanged    Medication(s) Administered   Medication Administration Time: 5/21/2024 8:05 AM

## 2024-05-21 NOTE — H&P
History and Physical - Pediatric Critical Care      Hitesh Dobson MRN# [id number]   YOB: 2005 Age: 19 year old      Date of Admission:  5/21/2024    Primary care provider: No Ref-Primary, Physician         Assessment and Plan:   Hitesh Dobson is a 19 year old with history of kyphosis and thoracic disc herniation now s/p PISF T3-L1 w/ SPOs T6-11 8-9 TTIF with Dr. Torres on 5/21/24.     Plan:    CNS:   PCA for pain, morphine 1mg/hr continuous + 1mg q10min.   -Lidocaine drip 0.5mg/kg/hr       -monitor for lidocaine toxicity  -Scheduled acetominophen q6  -Neuro checks q4    CVS: Was on phenylephrine inititally due to MAPS below goal of 85; continue to monitor off drip  -Phenyleprhine discontinued  -MAP goal >85    Resp: No concerns. On RA    FEN:   -ADAT now that off phenylephrine, starting with ice chips/clears  -IVF NS 100ml/hr, IV/PO titrate    GI:  At risk for opoiod-induced consitpation, nausea  -Miralax 17g every day  -Senna daily  -Scopolamine patch in place    Heme: At risk for post op bleeding  -Repeat CBC in AM     ID: At risk for post-op infection  -Ancef 50mg/kg q8 until deep drain is out per ortho    Endo: No concerns    Uro: Sumner in place until POD 1-2, removal per ortho            Chief Complaint:   Need for monitoring for lidocaine toxicity s/p spinal fusion     History is obtained from the patient         History of Present Illness:   Hitesh Dobson is a 19 year old male who presents with need for monitoring for symptoms of lidocaine toxicity after spinal fusion today.     He has a history of kyphosis and thoracic disc herniation which was corrected with PISF T3-L1 w/ SPOs T6-11 8-9 TTIF with Dr. Torres. During his procedure, there was concern for neuromonitoring tracing noting weakness in the left adductor and proximal leg, for which anesthesia was reduced and lidocaine was decreased. He otherwise did well but required higher MAPs to maintain neuros.  Overall, procedure went well and he was transitioned to ICU care after his procedure on a phenyleprine drip still running at 0.9mcg/kg/min and his lidocaine drip in place. Besides pain, he otherwise feels well after waking up more but does endorse desire to eat/have ice chips.        Past Medical History:   I have reviewed this patient's past medical history          Past Surgical History:   I have reviewed this patient's past surgical history  Past Surgical History:   Procedure Laterality Date    EXTRACTION(S) DENTAL     PISF T3-L1 w/ SPOs T6-11 8-9 TTIF with Dr. Torres on 5/21/24.           Social History:   I have reviewed this patient's social history          Family History:   This patient has no significant family history          Immunizations:     Immunization History   Administered Date(s) Administered    DTAP (<7y) 2003, 2003, 2003, 05/07/2004, 02/27/2008    HIB (PRP-T) 2003, 2003, 01/30/2004    HepA, Unspecified 07/17/2017, 01/23/2018    HepB, Unspecified 2003, 2003, 2003, 01/30/2004    Hpv, Unspecified  07/17/2017, 01/23/2018    Influenza (intradermal) 01/07/2004, 10/27/2006, 10/14/2010, 10/23/2014, 01/23/2018, 09/21/2020    MMR 01/30/2004, 02/27/2008    Meningococcal B (Bexsero ) 07/26/2019    Meningococcal,unspecified 08/07/2014, 07/29/2019    Pneumo Conj 13-V (2010&after) 2003, 2003, 2003, 01/30/2004    Polio, Unspecified 2003, 2003, 2003, 02/27/2008    TDAP Vaccine (Adacel) 08/07/2014    Varicella 05/07/2004, 02/27/2008            Allergies:     Allergies   Allergen Reactions    Eye Drops Allergy Relief [Tetrahydrozoline-Zn Sulfate]      Other reaction(s): rhinitis             Medications:     Prior to Admission medications    Medication Sig Last Dose Taking? Auth Provider Long Term End Date   multivitamin w/minerals (MULTI-VITAMIN) tablet Take 1 tablet by mouth daily When remember's Past Month Yes Reported, Patient    "            Review of Systems:   The Review of Systems is negative other than noted in the HPI         Physical Exam:   Blood pressure 119/54, pulse 73, temperature 98.1  F (36.7  C), temperature source Axillary, resp. rate 12, height 1.854 m (6' 1\"), weight 67.2 kg (148 lb 2.4 oz), SpO2 96%.  Constitutional: tired appearing but alert, endorsing pain, appropriately interactive   Eyes:  Lids and lashes normal, pupils equal, round and reactive to light, extra ocular muscles intact, sclera clear, conjunctiva normal  ENT:  normocephalic, without obvious abnormality, atraumatic, MMM  Neck:  ROM slightly restricted due to pain s/p surgery, symmetrical, trachea midline, no adenopathy, thyroid symmetric, not enlarged and no tenderness, skin normal  Lungs:  No increased work of breathing, mildly decreased air exchange, clear to auscultation bilaterally, no crackles or wheezing. Satting well on RA, endorses difficulty taking full breath 2/2 pain  Cardiovascular:  regular rate and rhythm, normal S1 and S2, no S3 or S4, and no murmur noted  Abdomen:  No scars, soft, non-distended, non-tender, no masses palpated, no hepatosplenomegally  Musculoskeletal:  no lower extremity pitting edema present  tone is normal. No joint swelling noted. Motion of spine is restricted 2/2 pain, difficulty moving on own but good strength in extremities.   Neurologic:  Awake, alert.  Appropriately asking about surgery results. Cranial nerves II-XII are grossly intact.    Skin:  no bruising or bleeding and normal skin color, texture, turgor, did not visualize incision on back due to dressing. Drain in place.           Data:   All laboratory data reviewed  All laboratory and imaging data in the past 24 hours reviewed       Lab Results   Component Value Date     05/21/2024     04/24/2024    Lab Results   Component Value Date    CHLORIDE 105 04/24/2024    Lab Results   Component Value Date    BUN 11.1 04/24/2024      Lab Results   Component " Value Date    POTASSIUM 4.4 05/21/2024    Lab Results   Component Value Date    CO2 25 04/24/2024    Lab Results   Component Value Date    CR 0.98 04/24/2024        Lab Results   Component Value Date    WBC 5.8 04/24/2024    HGB 13.3 05/21/2024    HCT 43.7 04/24/2024    MCV 91 04/24/2024     04/24/2024     Chest x-ray:   Right internal jugular line in the SVC, no lung consolidations, effusions. No pneumothorax. Significant spinal rods present       This patient was discussed with the fellow on service and the attending, Dr. Mclaughlin.    Janet Taveras MD  PGY2 Pediatrics           Jim Santo)

## 2024-05-21 NOTE — ANESTHESIA POSTPROCEDURE EVALUATION
Patient: Hitesh Dobson    Procedure: Procedure(s):  Posterior Spinal Fusion Thoracic 3-Lumbar 1, Segmental Instrumentation, Hernandez Gallo Osteotomies Thoracic 6-11, thoracic 8-9 discetomy, transforaminal interbody fusion, insert intervertebral device       Anesthesia Type:  General    Note:  Disposition: ICU            ICU Sign Out: Anesthesiologist/ICU physician sign out WAS performed   Postop Pain Control: Uneventful            Sign Out: Well controlled pain   PONV: No   Neuro/Psych: Uneventful            Sign Out: Acceptable/Baseline neuro status   Airway/Respiratory: Uneventful            Sign Out: Acceptable/Baseline resp. status   CV/Hemodynamics: Uneventful            Sign Out: Acceptable CV status; No obvious hypovolemia; No obvious fluid overload   Other NRE: NONE   DID A NON-ROUTINE EVENT OCCUR? No    Event details/Postop Comments:  Pt extubated in OR.  Transferred to PICU awake and comfortable, monitored without incident.  Report given.       Last vitals:  Vitals:    05/21/24 0619 05/21/24 0712 05/21/24 1400   BP: 119/54     Pulse: (!) 46 53 73   Resp: 14  12   Temp: 36.5  C (97.7  F)  36.7  C (98.1  F)   SpO2: 100%  96%       Electronically Signed By: Lara Castro MD  May 21, 2024  3:12 PM

## 2024-05-22 ENCOUNTER — APPOINTMENT (OUTPATIENT)
Dept: PHYSICAL THERAPY | Facility: CLINIC | Age: 19
End: 2024-05-22
Attending: ORTHOPAEDIC SURGERY
Payer: COMMERCIAL

## 2024-05-22 PROBLEM — Z98.1 S/P FUSION OF THORACIC SPINE: Status: ACTIVE | Noted: 2024-05-22

## 2024-05-22 LAB
ALBUMIN SERPL BCG-MCNC: 4.3 G/DL (ref 3.5–5.2)
ANION GAP SERPL CALCULATED.3IONS-SCNC: 16 MMOL/L (ref 7–15)
BASOPHILS # BLD AUTO: 0 10E3/UL (ref 0–0.2)
BASOPHILS NFR BLD AUTO: 0 %
BUN SERPL-MCNC: 10.1 MG/DL (ref 6–20)
CALCIUM SERPL-MCNC: 9.4 MG/DL (ref 8.6–10)
CHLORIDE SERPL-SCNC: 100 MMOL/L (ref 98–107)
CREAT SERPL-MCNC: 0.85 MG/DL (ref 0.67–1.17)
DEPRECATED HCO3 PLAS-SCNC: 24 MMOL/L (ref 22–29)
EGFRCR SERPLBLD CKD-EPI 2021: >90 ML/MIN/1.73M2
EOSINOPHIL # BLD AUTO: 0 10E3/UL (ref 0–0.7)
EOSINOPHIL NFR BLD AUTO: 0 %
ERYTHROCYTE [DISTWIDTH] IN BLOOD BY AUTOMATED COUNT: 12.2 % (ref 10–15)
GLUCOSE SERPL-MCNC: 94 MG/DL (ref 70–99)
HCT VFR BLD AUTO: 38.6 % (ref 40–53)
HGB BLD-MCNC: 13.2 G/DL (ref 13.3–17.7)
IMM GRANULOCYTES # BLD: 0.1 10E3/UL
IMM GRANULOCYTES NFR BLD: 0 %
LYMPHOCYTES # BLD AUTO: 1.5 10E3/UL (ref 0.8–5.3)
LYMPHOCYTES NFR BLD AUTO: 10 %
MAGNESIUM SERPL-MCNC: 1.7 MG/DL (ref 1.7–2.3)
MCH RBC QN AUTO: 29.9 PG (ref 26.5–33)
MCHC RBC AUTO-ENTMCNC: 34.2 G/DL (ref 31.5–36.5)
MCV RBC AUTO: 88 FL (ref 78–100)
MONOCYTES # BLD AUTO: 1.4 10E3/UL (ref 0–1.3)
MONOCYTES NFR BLD AUTO: 9 %
NEUTROPHILS # BLD AUTO: 11.8 10E3/UL (ref 1.6–8.3)
NEUTROPHILS NFR BLD AUTO: 81 %
NRBC # BLD AUTO: 0 10E3/UL
NRBC BLD AUTO-RTO: 0 /100
PHOSPHATE SERPL-MCNC: 3.1 MG/DL (ref 2.5–4.5)
PLATELET # BLD AUTO: 211 10E3/UL (ref 150–450)
POTASSIUM SERPL-SCNC: 4.1 MMOL/L (ref 3.4–5.3)
RBC # BLD AUTO: 4.41 10E6/UL (ref 4.4–5.9)
SODIUM SERPL-SCNC: 140 MMOL/L (ref 135–145)
WBC # BLD AUTO: 14.8 10E3/UL (ref 4–11)

## 2024-05-22 PROCEDURE — 85025 COMPLETE CBC W/AUTO DIFF WBC: CPT | Performed by: STUDENT IN AN ORGANIZED HEALTH CARE EDUCATION/TRAINING PROGRAM

## 2024-05-22 PROCEDURE — 93010 ELECTROCARDIOGRAM REPORT: CPT | Mod: RTG | Performed by: PEDIATRICS

## 2024-05-22 PROCEDURE — 82040 ASSAY OF SERUM ALBUMIN: CPT

## 2024-05-22 PROCEDURE — 250N000011 HC RX IP 250 OP 636: Mod: JZ | Performed by: STUDENT IN AN ORGANIZED HEALTH CARE EDUCATION/TRAINING PROGRAM

## 2024-05-22 PROCEDURE — 99291 CRITICAL CARE FIRST HOUR: CPT | Performed by: PEDIATRICS

## 2024-05-22 PROCEDURE — 250N000011 HC RX IP 250 OP 636

## 2024-05-22 PROCEDURE — 36415 COLL VENOUS BLD VENIPUNCTURE: CPT

## 2024-05-22 PROCEDURE — 258N000003 HC RX IP 258 OP 636: Performed by: STUDENT IN AN ORGANIZED HEALTH CARE EDUCATION/TRAINING PROGRAM

## 2024-05-22 PROCEDURE — G0463 HOSPITAL OUTPT CLINIC VISIT: HCPCS

## 2024-05-22 PROCEDURE — 250N000013 HC RX MED GY IP 250 OP 250 PS 637

## 2024-05-22 PROCEDURE — 83735 ASSAY OF MAGNESIUM: CPT

## 2024-05-22 PROCEDURE — 97162 PT EVAL MOD COMPLEX 30 MIN: CPT | Mod: GP

## 2024-05-22 PROCEDURE — 203N000001 HC R&B PICU UMMC

## 2024-05-22 PROCEDURE — 82374 ASSAY BLOOD CARBON DIOXIDE: CPT

## 2024-05-22 PROCEDURE — 97116 GAIT TRAINING THERAPY: CPT | Mod: GP

## 2024-05-22 PROCEDURE — 3E043XZ INTRODUCTION OF VASOPRESSOR INTO CENTRAL VEIN, PERCUTANEOUS APPROACH: ICD-10-PCS | Performed by: PEDIATRICS

## 2024-05-22 PROCEDURE — 97530 THERAPEUTIC ACTIVITIES: CPT | Mod: GP

## 2024-05-22 RX ORDER — METHOCARBAMOL 750 MG/1
750 TABLET, FILM COATED ORAL 4 TIMES DAILY PRN
Status: DISCONTINUED | OUTPATIENT
Start: 2024-05-22 | End: 2024-05-23

## 2024-05-22 RX ORDER — DIAZEPAM 5 MG
5 TABLET ORAL EVERY 6 HOURS
Status: DISCONTINUED | OUTPATIENT
Start: 2024-05-22 | End: 2024-05-23

## 2024-05-22 RX ORDER — DIAZEPAM 10 MG/2ML
7.5 INJECTION, SOLUTION INTRAMUSCULAR; INTRAVENOUS EVERY 6 HOURS PRN
Status: DISCONTINUED | OUTPATIENT
Start: 2024-05-22 | End: 2024-05-22

## 2024-05-22 RX ORDER — ONDANSETRON 2 MG/ML
4 INJECTION INTRAMUSCULAR; INTRAVENOUS EVERY 6 HOURS PRN
Status: DISCONTINUED | OUTPATIENT
Start: 2024-05-22 | End: 2024-05-23

## 2024-05-22 RX ORDER — METHOCARBAMOL 750 MG/1
750 TABLET, FILM COATED ORAL 4 TIMES DAILY
Status: DISCONTINUED | OUTPATIENT
Start: 2024-05-22 | End: 2024-05-22

## 2024-05-22 RX ORDER — ACETAMINOPHEN 325 MG/1
975 TABLET ORAL EVERY 6 HOURS
Status: DISCONTINUED | OUTPATIENT
Start: 2024-05-22 | End: 2024-05-23

## 2024-05-22 RX ADMIN — DIAZEPAM 2.5 MG: 5 INJECTION INTRAMUSCULAR; INTRAVENOUS at 00:33

## 2024-05-22 RX ADMIN — DIAZEPAM 5 MG: 5 TABLET ORAL at 22:17

## 2024-05-22 RX ADMIN — CEFAZOLIN SODIUM 2 G: 2 INJECTION, SOLUTION INTRAVENOUS at 04:13

## 2024-05-22 RX ADMIN — ACETAMINOPHEN 1000 MG: 10 INJECTION INTRAVENOUS at 09:03

## 2024-05-22 RX ADMIN — Medication: at 18:34

## 2024-05-22 RX ADMIN — SODIUM CHLORIDE: 9 INJECTION, SOLUTION INTRAVENOUS at 02:16

## 2024-05-22 RX ADMIN — DIAZEPAM 5 MG: 5 INJECTION INTRAMUSCULAR; INTRAVENOUS at 10:42

## 2024-05-22 RX ADMIN — ACETAMINOPHEN 975 MG: 325 TABLET, FILM COATED ORAL at 16:01

## 2024-05-22 RX ADMIN — CEFAZOLIN SODIUM 2 G: 2 INJECTION, SOLUTION INTRAVENOUS at 12:36

## 2024-05-22 RX ADMIN — DIAZEPAM 5 MG: 5 INJECTION INTRAMUSCULAR; INTRAVENOUS at 03:35

## 2024-05-22 RX ADMIN — Medication: at 00:37

## 2024-05-22 RX ADMIN — CEFAZOLIN SODIUM 2 G: 2 INJECTION, SOLUTION INTRAVENOUS at 21:06

## 2024-05-22 RX ADMIN — ACETAMINOPHEN 1000 MG: 10 INJECTION INTRAVENOUS at 02:36

## 2024-05-22 RX ADMIN — DIAZEPAM 7.5 MG: 5 INJECTION INTRAMUSCULAR; INTRAVENOUS at 15:56

## 2024-05-22 RX ADMIN — ONDANSETRON 4 MG: 2 INJECTION INTRAMUSCULAR; INTRAVENOUS at 18:01

## 2024-05-22 RX ADMIN — ACETAMINOPHEN 975 MG: 325 TABLET, FILM COATED ORAL at 22:17

## 2024-05-22 ASSESSMENT — ACTIVITIES OF DAILY LIVING (ADL)
ADLS_ACUITY_SCORE: 21
ADLS_ACUITY_SCORE: 25
ADLS_ACUITY_SCORE: 21
ADLS_ACUITY_SCORE: 25
ADLS_ACUITY_SCORE: 21
ADLS_ACUITY_SCORE: 21
ADLS_ACUITY_SCORE: 25
ADLS_ACUITY_SCORE: 21
ADLS_ACUITY_SCORE: 21
ADLS_ACUITY_SCORE: 25
ADLS_ACUITY_SCORE: 21
ADLS_ACUITY_SCORE: 25
ADLS_ACUITY_SCORE: 21
ADLS_ACUITY_SCORE: 25
ADLS_ACUITY_SCORE: 21

## 2024-05-22 NOTE — PROGRESS NOTES
05/22/24 1200   Appointment Info   Signing Clinician's Name / Credentials (PT) Apple Bailey, PT, DPT   Living Environment   People in Home parent(s)   Current Living Arrangements house   Home Accessibility stairs within home   Number of Stairs, Within Home, Primary greater than 10 stairs   Stair Railings, Within Home, Primary none   Transportation Anticipated car, drives self   Disability/Function   Wear Glasses or Blind yes   Vision Management glasses   Concentrating, Remembering or Making Decisions Difficulty no   Difficulty Communicating no   Difficulty Eating/Swallowing no   Walking or Climbing Stairs Difficulty no   Dressing/Bathing Difficulty no   Toileting issues no   Doing Errands Independently Difficulty (such as shopping) no   Equipment Currently Used at Home none   Change in Functional Status Since Onset of Current Illness/Injury yes   General Information   Onset of Illness/Injury or Date of Surgery - Date 05/21/24   Referring Physician Janet Taveras MD   Patient/Family Goals  return to prior level of function;return home with independent mobility   Pertinent History of Current Problem (include personal factors and/or comorbidities that impact the POC) Hitesh Dobson is a 19 year old male with PMH including scheuermann's kyphosis, thoracic disc herniation now s/p PISF T3-L1 w/ SPOs T6-11 8-9 TTIF with Dr. Torres on 5/21/24   Parent/Caregiver Involvement Attentive to pt needs   Precautions/Limitations spinal precautions   Weight-Bearing Status - LUE weight-bearing as tolerated   Weight-Bearing Status - RUE weight-bearing as tolerated   Weight-Bearing Status - LLE weight-bearing as tolerated   Weight-Bearing Status - RLE weight-bearing as tolerated   Pain Assessment   Patient Currently in Pain Yes, see Vital Sign flowsheet   Cognitive Status Examination   Orientation orientation to person, place and time   Level of Consciousness alert   Follows Commands and Answers Questions 100% of the time   Personal  Safety and Judgment intact   Memory intact   Cognitive Comment Increased sedation from pain medication impeding in patients ability to stay awake, improved with mobility   Behavior   Behavior cooperative   Posture    Posture deficits were identified   Posture: Deficits Identified poor head alignment;poor trunk alignment;rounded shoulders   Range of Motion (ROM)   Range of Motion Range of Motion is limited   Cervical Range of Motion  Decreased secondary to R IJ   Trunk Range of Motion  Decreased secondary to pain and precautions   Upper Extremity Range of Motion  WFL   Lower Extremity Range of Motion  WFL   Strength   Manual Muscle Testing Results Strength deficits identified   Cervical Strength  WFL   Trunk Strength  Decreased secondary to pain and precautions   Upper Extremity Strength  Decreased secondary to fatigue   Lower Extremity Strength  Decreased secondary to fatigue   Muscle Tone Assessment   Muscle Tone  Tone is within normal limits   Transfer Skills and Mobility   Bed Mobility Comments Min A supine<>sit   Functional Motor Performance Gross Motor Skills   Coordination Gross Motor Coordination appropriate   Functional Motor Performance-Higher Level Motor Skills   Higher Level Gross Motor Skill Comments Not appropriate to assess   Gait   Gait Comments Stepping with 2HHA   Balance   Balance Comments Impaired sitting and standing balance   General Therapy Interventions   Planned Therapy Interventions Therapeutic Procedures;Therapeutic Activities;Gait Training   Clinical Impression   Criteria for Skilled Therapeutic Intervention Yes, treatment indicated   PT Diagnosis (PT) Impaired independence with mobility   Influenced by the following impairments pain   Clinical Presentation Evolving/Changing   Clinical Presentation Rationale Greater than 3 body structure/functional impairments requiring moderately complex decision making to treat   Clinical Decision Making (Complexity) Moderate complexity   Risk & Benefits  of therapy have been explained Yes   Patient, Family & other staff in agreement with plan of care Yes   Clinical Impression Comments Hitesh Dobson is a 18yo s/p spinal fusion who will benefit from skilled PT for progression of functional strength in order to return to PLOF.   PT Total Evaluation Time   PT Eval, Moderate Complexity Minutes (40527) 8   Physical Therapy Goals   PT Frequency 2x/day   PT Predicted Duration/Target Date for Goal Attainment 05/31/24   PT Goals Bed Mobility;Transfers;Gait;Stairs;PT Goal 1   PT: Bed Mobility Independent;Supine to/from sit;Rolling;Within precautions   PT: Transfers Independent;Sit to/from stand;Within precautions   PT: Gait Independent;Greater than 200 feet   PT: Stairs Independent;Greater than 10 stairs   PT: Goal 1 Pt will demonstrate and verbalize understanding of precautions with all mobility in order to safely d/c home.

## 2024-05-22 NOTE — PHARMACY-ADMISSION MEDICATION HISTORY
Pharmacy Intern Admission Medication History    Admission medication history is complete. The information provided in this note is only as accurate as the sources available at the time of the update.    Information Source(s): Family member (Mother - Kamille) via phone    Pertinent Information: None    Changes made to PTA medication list:  Added: None  Deleted: None  Changed: None    Allergies reviewed with patient and updates made in EHR: yes    Medication History Completed By: Emiliano Dumont 5/22/2024 5:13 PM    PTA Med List   Medication Sig Last Dose    multivitamin w/minerals (MULTI-VITAMIN) tablet Take 1 tablet by mouth daily When remember's Past Month     I reviewed the information above and discussed with the pharmacy intern, and agree with all documentation.    Zoe Drummond, PharmD, [facility]  Pediatric Clinical Pharmacist  May 22, 2024

## 2024-05-22 NOTE — PROGRESS NOTES
"Orthopaedic Surgery Progress Note:       Subjective:   Seen and examined on morning rounds. Pain tolerable at this time.  Sleeping on arrival wakes easily to voice.  Denies any chest pain shortness of breath fevers chills nausea vomiting.  Denies any radicular pain numbness tingling down the bilateral lower extremities.  Has not attempted significant p.o. intake, tolerating some liquids.  Denies any flatus or BM at this point.      Objective:   BP (!) 150/60   Pulse 58   Temp 98.1  F (36.7  C) (Oral)   Resp (!) 9   Ht 1.854 m (6' 1\")   Wt 67.2 kg (148 lb 2.4 oz)   SpO2 95%   BMI 19.55 kg/m    I/O this shift:  In: 151.6 [I.V.:151.6]  Out: 255 [Urine:255]  Gen: NAD. Resting comfortably in bed  Resp: Breathing comfortably on RA  : Sumner in place    Drain in place 15 cc out since surgery  Dressing cdi apart from 2 small BB sized area of strikethrough at the inferior aspect of the dressing       Lumbar Spine:    Appearance - No gross stepoffs or deformities    Motor -     L2-3: Hip flexion 5/5 R and 5/5 L strength          L3/4:  Knee extension R 5/5 and L 5/5 strength         L4/5:  Foot dorsiflexion R 5/5 L 5/5 and       EHL dorsiflexion R 5/5 L 5/5 strength         S1:  Plantarflexion/Peroneal Muscles  R 5/5 and L 5/5 strength    Sensation: intact to light touch L3-S1 distribution BLE        Labs:  Lab Results   Component Value Date    WBC 14.8 (H) 05/22/2024    HGB 13.2 (L) 05/22/2024     05/22/2024     Assessment and Plan: Hitesh Dobson is a 19 year old male with PMH including scheuermann's kyphosis, thoracic disc herniation now s/p PISF T3-L1 w/ SPOs T6-11 8-9 TTIF with Dr. Torres on 5/21/24.      Discontinue MAP goals and pressor support  Monitor patient in ICU for 12 hours after pressor support  Increase lidocaine to 1 mg/kg  If patient has significant pain throughout the day consult inpatient pain team  Ambulate  Encourage p.o. intake  Maintain drain for an additional day     Ortho (Spine) " Primary  Activity:   - Up with assist until independent. No excessive bending or twisting. No lifting >10 lbs x 6 weeks.   - If Lucie lift required for mobilization, please use high-back sling rather than universal sling. This is to minimize spine flexion.  Weight bearing status: WBAT.  Pain management:   - 1mg/kg IV lidocaine: discontinue at 8am on POD#2 or earlier if complications arise.  - Transition from IV to PO narcotics as tolerated. No NSAIDs x 3 months.   Antibiotics: Ancef 2g Q8hrs until deep drain removed  Diet: Begin with clear fluids and progress diet as tolerated.   DVT prophylaxis: SCDs only. No chemical DVT ppx needed.  Imaging: XR scoli PTDC - ordered.  Labs: Hgb POD#1,2.  Bracing/Splinting: None.  Dressings: Keep Aquacel c/d/i x 7 days.  Drains: Document output per shift, will be discontinued at Orthopedic Surgery discretion.  Sumner catheter: Remove POD#1,2.  Physical Therapy/Occupational Therapy: Eval and treat.  Cultures: none.    Consults: Consult IP team when able to discharge from ICU   Follow-up: Clinic with Dr. Torres in 6 weeks with repeat x-rays.   Disposition: Pending progress with therapies, pain control on orals, and medical stability, anticipate discharge to home on POD #5-7.    Pilo Craft DO  Spine Fellow

## 2024-05-22 NOTE — PROGRESS NOTES
Pediatric Critical Care Night Note:    Hitesh Dobson remains critically ill with post operative pain and need for supraphysiologic MAP after posterior spinal fusion with temporary loss of evoked potentials (since returned).     Interval events: Significant pain despite multimodal analgesia.     VITALS:  Pulse  Av.4  Min: 46  Max: 73  Arterial Line BP: (145-182)/(55-91) 145/66  MAP:  [82 mmHg-119 mmHg] 90 mmHg  Systolic (24hrs), Av , Min:119 , Max:150     Diastolic (24hrs), Av, Min:54, Max:60    Resp  Av.9  Min: 12  Max: 18  SpO2  Av.1 %  Min: 96 %  Max: 100 %    I/O:  I/O last 3 completed shifts:  In: 2200 [I.V.:1800; Other:150]  Out: 875 [Urine:425; Blood:450]  I/O this shift:  In: 517.4 [I.V.:517.4]  Out: 1050 [Urine:1035; Drains:15]    Medications:  All medications reviewed    Labs:  All labs reviewed   Additions/changes to plan include:  1) Start hydromorphone PCA and prn diazepam  2) Continue lidocaine drip (at 0.5 mg/kg/hr), monitoring for symptoms of toxicity  3) Goal MAP>85, utilize phenylephrine as needed to achieve goal  I spent a total of 35 minutes providing critical care services at the bedside, and on the critical care unit, evaluating the patient, directing care and reviewing laboratory values and radiologic reports for Hitesh Dobson.    Marie Szymanski MD

## 2024-05-22 NOTE — PLAN OF CARE
Goal Outcome Evaluation:      Plan of Care Reviewed With: parent, patient    Overall Patient Progress: no change     Pt afebrile. Neuros intact. No S/S of lidocaine toxicity. Rating pain 7-10. Switched from morphine to dilaudid PCA and increased from 0.2 to 0.4. Given PRN valium x3 and encouraging PCA bolus use. LS clear on RA. Restarted phenylephrine for MAP < 85 at 0.3. Drinking well, advanced to regular diet. Good UOP from aden. Spinal incision CDI, small amounts of bloody output from drain. Mom at bedside, updated on POC.

## 2024-05-22 NOTE — CONSULTS
WOC Consult    S: WOC Consult to evaluate and treat bilateral knee pressure injuries.     B: Per Dr Pilo Hubbard on 5/22/2024: Hitesh Dobson is a 19 year old male with PMH including scheuermann's kyphosis, thoracic disc herniation now s/p PISF T3-L1 w/ SPOs T6-11 8-9 TTIF with Dr. Torres on 5/21/24.     A: Bilateral knees assessed. All skin is pink, blanchable and intact. No pressure injury noted. Of note, patient was prone in OR on 5/21/2024 for spinal surgery.     R: WOC will sign off.     Carley Lenz RN, CWOCN  Available via Mover rekha: [facility] WOC  Office *[phone]

## 2024-05-22 NOTE — PROGRESS NOTES
Acupuncture Clinical Internship Intake and Treatment Documentation   [facility]    Date:  5/22/2024  Patient Name:  Hitesh Dobson   YOB: 2005     Repeat Patient:  No  Has patient had acupoint/acupressure treatment before:  No    Signed consent placed in the medical record:  Yes  Patient/Family verbalizes understanding of risks and benefits:  Yes  Required information provided to patient:  Yes    Diagnosis:  Scheuermann's kyphosis [M42.00]  Thoracic disc herniation [M51.24]    Patient condition and treatment:   Hitesh Dobson is a 19 year old male with PMH including scheuermann's kyphosis, thoracic disc herniation now s/p PISF T3-L1 w/ SPOs T6-11 8-9 TTIF with Dr. Torres on 5/21/24.      Reason for Intervention Today/Chief Complaint:  pain    Isolation:  No  Type:  None    PRE-SCORE:  severe    Other Western medical information:  had lumbar fusion yesterday    Medications  No current outpatient medications on file.     Current Facility-Administered Medications   Medication Dose Route Frequency Provider Last Rate Last Admin    acetaminophen (TYLENOL) tablet 975 mg  975 mg Oral Q6H Cynthia Owens MD   975 mg at 05/22/24 1601    ceFAZolin (ANCEF) 2 g in 100 mL D5W intermittent infusion  2 g Intravenous Q8H Janet Taveras  mL/hr at 05/22/24 1236 2 g at 05/22/24 1236    diazepam (VALIUM) injection 7.5 mg  7.5 mg Intravenous Q6H PRN Fior English MD   7.5 mg at 05/22/24 1556    heparin in 0.9% NaCl 50 unit/50 mL infusion  1 mL/hr Intravenous Continuous Janet Taveras MD        heparin lock flush 10 UNIT/ML injection 2-4 mL  2-4 mL Intracatheter Q24H Janet Taveras MD   4 mL at 05/21/24 1805    heparin lock flush 10 UNIT/ML injection 2-4 mL  2-4 mL Intracatheter Q1H PRN Janet Taveras MD        HYDROmorphone (DILAUDID) PCA 0.2 mg/mL OPIOID TOLERANT   Intravenous Continuous Soco Connell MD   Rate Verify at 05/22/24 1540    lidocaine (LMX4) cream   Topical  Q1H PRN Pilo Craft MD        lidocaine (LMX4) cream   Topical Q1H PRN Janet Taveras MD        lidocaine 1 % 0.1-1 mL  0.1-1 mL Other Q1H PRN Pilo Craft MD        [Held by provider] methocarbamol (ROBAXIN) tablet 750 mg  750 mg Oral 4x Daily PRN Fior English MD        naloxone (NARCAN) injection 0.2 mg  0.2 mg Intravenous Q2 Min PRN Jayson Torres MD        Or    naloxone (NARCAN) injection 0.4 mg  0.4 mg Intravenous Q2 Min PRN Jayson Torres MD        Or    naloxone (NARCAN) injection 0.2 mg  0.2 mg Intramuscular Q2 Min PRN Jayson Torres MD        Or    naloxone (NARCAN) injection 0.4 mg  0.4 mg Intramuscular Q2 Min PRN Jayson Torres MD        naloxone (NARCAN) injection 0.4 mg  0.4 mg Intravenous Q2 Min PRN Janet Taveras MD        [Held by provider] ondansetron (ZOFRAN) injection 4 mg  4 mg Intravenous Q6H PRN Fior English MD        polyethylene glycol (MIRALAX) Packet 17 g  17 g Oral Daily Janet Taveras MD        sennosides (SENOKOT) tablet 8.6 mg  8.6 mg Oral BID PRN Janet Taveras MD        sodium chloride (PF) 0.9% PF flush 3 mL  3 mL Intracatheter Q8H Pilo Craft MD   3 mL at 05/21/24 1526    sodium chloride (PF) 0.9% PF flush 3 mL  3 mL Intracatheter q1 min prn Pilo Craft MD        sodium chloride 0.9 % infusion   Intravenous Continuous Janet Taveras MD 3 mL/hr at 05/21/24 1944 New Bag at 05/21/24 1944    Information for today's treatment was gathered from nursing report, chart review and interview with Hitesh and his mom.    Pre-Treatment Assessment  Chief Complaint/ Reason for Intervention Today:  back pain   Chief Complaint Pre-Score:  severe   Describe:  Hitesh had spinal surgery yesterday and has severe pain. He said right now its mostly in his midback and it feels throbbing.  Pain Location:  mid back  Pre Session Pain:  Severe  Pre Session Anxiety:  mild  Pre Session Nausea:  None    10 Traditional Chinese Medicine Assessment  "Questions  - Cold/ Heat:  neutral  - Sweat:  got sweaty this AM while eating  - Headaches/Body aches:  see main complaint  - Chest/Abdomen:  abdomen muscles keep tensing up and causing pain  - Digestion:  got nauseas today while eating  - Bowel Movement/Urination:  didn't ask  - Hearing/Vision:  no issues  - Sleep (prior to hospital):  waking up a lot at night, falls asleep easily.  - Energy:  low- is easily falling asleep and its uncontrollable  - Emotions:  mom reported he's anxious, he said he's \"shitty\" and worrying a lot  - Ob Gyn:  none  - Miscellaneous:  Hitesh was laying in bed with his eyes closed but answered all questions during treatment    Traditional Chinese Medicine Assessment  - TONGUE:  Did not observe -due to getting interrupted with nurse coming in and helping him move around  - PULSE:  Was only able to feel on the right side due to the left side being covered with bandage. Right side with full, wiry and choppy.  - OBSERVATIONS:  Hitesh was laying in bed and seemed uncomfortable.    Traditional Chinese Medicine Diagnosis  - BRANCH:  back pain due to qi stagnation and blood stasis  - ROOT:  kidney essence deficiency     Traditional Chinese Medicine Treatment  - ACUPUNCTURE:  needles retained for 20 minutes   Bilateral: Ear Shenmen, Midback, Liver 3, LI4,   Yin solano  Acupressure: Sp 6, GB 34, St 36, LI 10       Magnet informed consent signed and given:  No    Post Treatment Assessment  Chief complaint post score:  same  Post Session Observation:  hitesh fell asleep during treatment. The nurse asked him to rate his pain after the needles were in and he said \"not much\". During his session, his mom said that she's never seen him so relaxed.  Patient/Family Education:  Yes  Verbal information provided:  Yes  Written information provided:  No  All questions answered at time of treatment:  Yes    Treatment/Procedure(s) performed by:  Susan Zimmerman, Acupuncture Intern, [facility] "     Date: 5/22/2024     I attest that this Acupoint Therapy Treatment was done under my supervision and this note is accurate.    Rizwana Alcala L.Ac, Ma.OM   Acupuncture License # [id number]  [facility]

## 2024-05-22 NOTE — PROGRESS NOTES
[facility]    Pediatric Critical Care Progress Note   Date of Service (when I saw the patient): 05/22/2024    Interval Changes:  Pain difficult to capture overnight, which is improving this morning. No signs or symptoms of lidocaine toxicity.    Assessment:  Hitesh is a 19 year old with Schuermann's kyphosis who remains critically ill following posterior spinal fusion. Today is POD#1.       Plan by Systems:    Respiratory:  - no active issues  - encourage IS and OOB as tolerated    Cardiovascular:  - will liberalize MAP goal  - discontinue phenylephrine   - discontinue art line    FEN/GI:  - encourage PO intake  - consider IV/PO titrate  - goal fluid balance even    Hematology:  - no active issues    Infectious Disease:  - discuss duration of perioperative cefazolin with ortho    Endocrine:  - no active issues    Neurologic:  - scheduled acetaminophen  - hydromorphone PCA   - diazepam as needed for spasm  - continue lidocaine infusion - monitor for signs and symptoms of toxicity  - encourage environmental measures for delirium prevention and trend CAPD    Rehabilitation:  - encourage participation with PT, OT as able    Lines and Tubes:  - Sumner catheter: discuss discontinuation with ortho  - Central lines: remove R internal jugular today  - Arterial line: remove today  - ETT: none    Vitals:  All vital signs reviewed  Vitals:    05/21/24 0619   Weight: 67.2 kg (148 lb 2.4 oz)       Physical Exam:    GENERAL: Sleeping,   SKIN: Clear. No significant rash or lesions beyond baseline on exposed skin. Back incision covered; two spots of dried blood on bandaging.  HEAD: Atraumatic.  EYES:  Normal conjunctivae.  NOSE: Normal without discharge.  MOUTH/THROAT: Clear. No oral lesions. Teeth without obvious abnormalities.  NECK: Supple, no masses.  LUNGS: CTAB. No increased work of breathing.  HEART: RRR. Normal S1/S2. No murmurs. Normal pulses.  ABDOMEN: Soft,  non-tender, not distended, no appreciable masses or hepatosplenomegaly. Minimal bowel sounds.   EXTREMITIES: Full range of motion, no deformities. Grossly normal bulk and tone.  NEUROLOGIC: Awake, crying, appropriately fearful. Moving all extremities.     Review of Systems:  A complete review of systems was performed and is negative except as noted in the assessment and interval changes.    Data:  All nursing notes, medications, radiological studies, and laboratory values reviewed.    Communication:  No awake family at bedside this morning. Hitesh's primary care provider will be updated before discharge.     I spent a total of 60 minutes providing critical care services at the bedside and on the unit, evaluating the patient, directing care, reviewing laboratory values and radiologic reports, and completing documentation for Hitesh Dobson.    Nakia Mclaughlin MD  Pediatric Critical Care

## 2024-05-22 NOTE — PLAN OF CARE
Goal Outcome Evaluation:    Plan of Care Reviewed With: patient, parent    Overall Patient Progress: improvingOverall Patient Progress: improving    Afebrile. Neuro checks intact. No S/S of lidocaine toxicity. Some ectopy noted a  1500; MD notified and EKG ordered with an electrolyte panel. Rating pain 7-10 - dilaudid PCA infusing with slight improvement. Scheduled tylenol continued. PRN valium given prior to PT. PRN robaxan ordered and requested from pharmacy. LS clear, tolerating RA. Per ortho team; okay for MAP >65 - phenylephrine turned off at 0800. Tolerating general diet. Good UO from aden. Removed Arterial line, central line, and aden at 1300. Tolerated all procedures. Spinal incision CDI; small amounts of drainage from accordion drain. Mom at bedside and updated on POC.

## 2024-05-22 NOTE — PROGRESS NOTES
05/22/24 1453   Child Life   Location [facility] [address]   Interaction Intent Introduction of Services; Initial Assessment   Individuals Present Patient; Caregiver/Adult Family Member   Comments (names or other info) MotherKamille, present.   Intervention Goal To introduce self and servies to Hitesh and to assess coping.   Intervention Developmental Play; Supportive Check in   Developmental Play Comment Hitesh delined video games/card games/board games and is content with his personal phone and computer.   Supportive Check in Child Life Specialist was made aware by RN that Hitesh was expressing he was having consistent pain. Per RN, Hitesh shared that he really only notices the pain if he thinks about it and focuses on the pain.     This writer offered to provide distraction items (i.e., video games, card/board games, etc). RN was appreciative.     This writer introduced self and services to Hitesh and Mom. Upon arrival, Hitesh was alert, sitting up in bed on his personal phone. Mom was present at the bedside. Hitesh shared that he was doing well but did mention his pain to this writer. This writer offered to provide Hitesh with developmentally appropriate activities (I.e., video games, card/board games, etc) but Hitesh kindly declined at this time stating he has his personal phone and computer. Hitesh was appreciative and expressed no other needs at this time.   Distress Appropriate; low distress   Distress Indicators Patient report; staff observation   Major Change/Loss/Stressor/Fears Surgery/procedure   Time Spent   Direct Patient Care 10   Indirect Patient Care 5   Total Time Spent (Calc) 15

## 2024-05-23 ENCOUNTER — APPOINTMENT (OUTPATIENT)
Dept: PHYSICAL THERAPY | Facility: CLINIC | Age: 19
End: 2024-05-23
Attending: ORTHOPAEDIC SURGERY
Payer: COMMERCIAL

## 2024-05-23 ENCOUNTER — APPOINTMENT (OUTPATIENT)
Dept: OCCUPATIONAL THERAPY | Facility: CLINIC | Age: 19
End: 2024-05-23
Attending: ORTHOPAEDIC SURGERY
Payer: COMMERCIAL

## 2024-05-23 PROCEDURE — 99233 SBSQ HOSP IP/OBS HIGH 50: CPT | Performed by: PEDIATRICS

## 2024-05-23 PROCEDURE — 99254 IP/OBS CNSLTJ NEW/EST MOD 60: CPT | Performed by: INTERNAL MEDICINE

## 2024-05-23 PROCEDURE — 97535 SELF CARE MNGMENT TRAINING: CPT | Mod: GO | Performed by: OCCUPATIONAL THERAPIST

## 2024-05-23 PROCEDURE — 97530 THERAPEUTIC ACTIVITIES: CPT | Mod: GP

## 2024-05-23 PROCEDURE — 250N000013 HC RX MED GY IP 250 OP 250 PS 637: Performed by: STUDENT IN AN ORGANIZED HEALTH CARE EDUCATION/TRAINING PROGRAM

## 2024-05-23 PROCEDURE — 120N000002 HC R&B MED SURG/OB UMMC

## 2024-05-23 PROCEDURE — 250N000011 HC RX IP 250 OP 636: Performed by: PEDIATRICS

## 2024-05-23 PROCEDURE — 250N000013 HC RX MED GY IP 250 OP 250 PS 637

## 2024-05-23 PROCEDURE — 97116 GAIT TRAINING THERAPY: CPT | Mod: GP

## 2024-05-23 PROCEDURE — 97165 OT EVAL LOW COMPLEX 30 MIN: CPT | Mod: GO | Performed by: OCCUPATIONAL THERAPIST

## 2024-05-23 PROCEDURE — 250N000013 HC RX MED GY IP 250 OP 250 PS 637: Performed by: NURSE PRACTITIONER

## 2024-05-23 PROCEDURE — 250N000011 HC RX IP 250 OP 636: Performed by: STUDENT IN AN ORGANIZED HEALTH CARE EDUCATION/TRAINING PROGRAM

## 2024-05-23 PROCEDURE — 97530 THERAPEUTIC ACTIVITIES: CPT | Mod: GO | Performed by: OCCUPATIONAL THERAPIST

## 2024-05-23 PROCEDURE — 999N000007 HC SITE CHECK

## 2024-05-23 PROCEDURE — 99254 IP/OBS CNSLTJ NEW/EST MOD 60: CPT | Mod: GC | Performed by: ANESTHESIOLOGY

## 2024-05-23 PROCEDURE — 999N000040 HC STATISTIC CONSULT NO CHARGE VASC ACCESS

## 2024-05-23 RX ORDER — LIDOCAINE HYDROCHLORIDE ANHYDROUS AND DEXTROSE MONOHYDRATE .8; 5 G/100ML; G/100ML
1 INJECTION, SOLUTION INTRAVENOUS CONTINUOUS
Status: DISCONTINUED | OUTPATIENT
Start: 2024-05-23 | End: 2024-05-23

## 2024-05-23 RX ORDER — OXYCODONE HYDROCHLORIDE 5 MG/1
5 TABLET ORAL EVERY 4 HOURS PRN
Status: DISCONTINUED | OUTPATIENT
Start: 2024-05-23 | End: 2024-05-23

## 2024-05-23 RX ORDER — HYDROMORPHONE HYDROCHLORIDE 1 MG/ML
0.4 INJECTION, SOLUTION INTRAMUSCULAR; INTRAVENOUS; SUBCUTANEOUS
Status: DISCONTINUED | OUTPATIENT
Start: 2024-05-23 | End: 2024-05-26

## 2024-05-23 RX ORDER — ONDANSETRON 2 MG/ML
4 INJECTION INTRAMUSCULAR; INTRAVENOUS EVERY 6 HOURS PRN
Status: DISCONTINUED | OUTPATIENT
Start: 2024-05-23 | End: 2024-05-27 | Stop reason: HOSPADM

## 2024-05-23 RX ORDER — LIDOCAINE HYDROCHLORIDE ANHYDROUS AND DEXTROSE MONOHYDRATE .8; 5 G/100ML; G/100ML
.5-1 INJECTION, SOLUTION INTRAVENOUS CONTINUOUS
Status: DISCONTINUED | OUTPATIENT
Start: 2024-05-23 | End: 2024-05-23

## 2024-05-23 RX ORDER — HYDROXYZINE HYDROCHLORIDE 25 MG/1
25 TABLET, FILM COATED ORAL EVERY 6 HOURS PRN
Status: DISCONTINUED | OUTPATIENT
Start: 2024-05-23 | End: 2024-05-27 | Stop reason: HOSPADM

## 2024-05-23 RX ORDER — SENNOSIDES 8.6 MG
8.6 TABLET ORAL 2 TIMES DAILY
Status: DISCONTINUED | OUTPATIENT
Start: 2024-05-23 | End: 2024-05-23

## 2024-05-23 RX ORDER — OXYCODONE HYDROCHLORIDE 10 MG/1
10 TABLET ORAL EVERY 4 HOURS PRN
Status: DISCONTINUED | OUTPATIENT
Start: 2024-05-23 | End: 2024-05-23

## 2024-05-23 RX ORDER — SODIUM CHLORIDE 9 MG/ML
INJECTION, SOLUTION INTRAVENOUS CONTINUOUS
Status: DISCONTINUED | OUTPATIENT
Start: 2024-05-23 | End: 2024-05-24

## 2024-05-23 RX ORDER — HYDROXYZINE HYDROCHLORIDE 25 MG/1
50 TABLET, FILM COATED ORAL EVERY 6 HOURS PRN
Status: DISCONTINUED | OUTPATIENT
Start: 2024-05-23 | End: 2024-05-23

## 2024-05-23 RX ORDER — HYDROMORPHONE HYDROCHLORIDE 1 MG/ML
0.2 INJECTION, SOLUTION INTRAMUSCULAR; INTRAVENOUS; SUBCUTANEOUS
Status: DISCONTINUED | OUTPATIENT
Start: 2024-05-23 | End: 2024-05-26

## 2024-05-23 RX ORDER — HYDROXYZINE HYDROCHLORIDE 25 MG/1
25 TABLET, FILM COATED ORAL EVERY 6 HOURS PRN
Status: DISCONTINUED | OUTPATIENT
Start: 2024-05-23 | End: 2024-05-23

## 2024-05-23 RX ORDER — BISACODYL 10 MG
10 SUPPOSITORY, RECTAL RECTAL DAILY PRN
Status: DISCONTINUED | OUTPATIENT
Start: 2024-05-26 | End: 2024-05-27 | Stop reason: HOSPADM

## 2024-05-23 RX ORDER — ACETAMINOPHEN 325 MG/1
650 TABLET ORAL EVERY 4 HOURS PRN
Status: DISCONTINUED | OUTPATIENT
Start: 2024-05-26 | End: 2024-05-27 | Stop reason: HOSPADM

## 2024-05-23 RX ORDER — GABAPENTIN 300 MG/1
600 CAPSULE ORAL 3 TIMES DAILY
Status: DISCONTINUED | OUTPATIENT
Start: 2024-05-23 | End: 2024-05-27

## 2024-05-23 RX ORDER — HYDROXYZINE HYDROCHLORIDE 25 MG/1
50 TABLET, FILM COATED ORAL EVERY 6 HOURS PRN
Status: DISCONTINUED | OUTPATIENT
Start: 2024-05-23 | End: 2024-05-27 | Stop reason: HOSPADM

## 2024-05-23 RX ORDER — ONDANSETRON 4 MG/1
4 TABLET, ORALLY DISINTEGRATING ORAL EVERY 6 HOURS PRN
Status: DISCONTINUED | OUTPATIENT
Start: 2024-05-23 | End: 2024-05-27 | Stop reason: HOSPADM

## 2024-05-23 RX ORDER — POLYETHYLENE GLYCOL 3350 17 G/17G
17 POWDER, FOR SOLUTION ORAL 2 TIMES DAILY
Status: DISCONTINUED | OUTPATIENT
Start: 2024-05-23 | End: 2024-05-27 | Stop reason: HOSPADM

## 2024-05-23 RX ORDER — PROCHLORPERAZINE MALEATE 5 MG
10 TABLET ORAL EVERY 6 HOURS PRN
Status: DISCONTINUED | OUTPATIENT
Start: 2024-05-23 | End: 2024-05-27 | Stop reason: HOSPADM

## 2024-05-23 RX ORDER — GABAPENTIN 600 MG/1
600 TABLET ORAL ONCE
Qty: 1 TABLET | Refills: 0 | Status: COMPLETED | OUTPATIENT
Start: 2024-05-23 | End: 2024-05-23

## 2024-05-23 RX ORDER — ACETAMINOPHEN 325 MG/1
975 TABLET ORAL EVERY 8 HOURS
Status: COMPLETED | OUTPATIENT
Start: 2024-05-23 | End: 2024-05-26

## 2024-05-23 RX ORDER — OXYCODONE HYDROCHLORIDE 10 MG/1
10 TABLET ORAL
Status: DISCONTINUED | OUTPATIENT
Start: 2024-05-23 | End: 2024-05-24

## 2024-05-23 RX ORDER — CEFAZOLIN SODIUM 2 G/100ML
2 INJECTION, SOLUTION INTRAVENOUS EVERY 8 HOURS
Qty: 1500 ML | Refills: 0 | Status: DISCONTINUED | OUTPATIENT
Start: 2024-05-23 | End: 2024-05-25

## 2024-05-23 RX ORDER — AMOXICILLIN 250 MG
1 CAPSULE ORAL 2 TIMES DAILY
Status: DISCONTINUED | OUTPATIENT
Start: 2024-05-23 | End: 2024-05-25

## 2024-05-23 RX ORDER — FAMOTIDINE 20 MG/1
20 TABLET, FILM COATED ORAL 2 TIMES DAILY
Status: DISCONTINUED | OUTPATIENT
Start: 2024-05-23 | End: 2024-05-23

## 2024-05-23 RX ORDER — OXYCODONE HYDROCHLORIDE 5 MG/1
5 TABLET ORAL
Status: DISCONTINUED | OUTPATIENT
Start: 2024-05-23 | End: 2024-05-24

## 2024-05-23 RX ORDER — FAMOTIDINE 20 MG/1
20 TABLET, FILM COATED ORAL 2 TIMES DAILY
Status: DISCONTINUED | OUTPATIENT
Start: 2024-05-23 | End: 2024-05-27 | Stop reason: HOSPADM

## 2024-05-23 RX ORDER — METHOCARBAMOL 750 MG/1
750 TABLET, FILM COATED ORAL EVERY 6 HOURS PRN
Status: DISCONTINUED | OUTPATIENT
Start: 2024-05-23 | End: 2024-05-24

## 2024-05-23 RX ORDER — POLYETHYLENE GLYCOL 3350 17 G/17G
17 POWDER, FOR SOLUTION ORAL DAILY
Status: DISCONTINUED | OUTPATIENT
Start: 2024-05-24 | End: 2024-05-23

## 2024-05-23 RX ADMIN — GABAPENTIN 600 MG: 600 TABLET, FILM COATED ORAL at 01:54

## 2024-05-23 RX ADMIN — ACETAMINOPHEN 975 MG: 325 TABLET, FILM COATED ORAL at 16:23

## 2024-05-23 RX ADMIN — HYDROMORPHONE HYDROCHLORIDE 0.4 MG: 1 INJECTION, SOLUTION INTRAMUSCULAR; INTRAVENOUS; SUBCUTANEOUS at 21:30

## 2024-05-23 RX ADMIN — CEFAZOLIN SODIUM 2 G: 2 INJECTION, SOLUTION INTRAVENOUS at 13:04

## 2024-05-23 RX ADMIN — GABAPENTIN 600 MG: 300 CAPSULE ORAL at 13:42

## 2024-05-23 RX ADMIN — CEFAZOLIN SODIUM 2 G: 2 INJECTION, SOLUTION INTRAVENOUS at 03:30

## 2024-05-23 RX ADMIN — ACETAMINOPHEN 975 MG: 325 TABLET, FILM COATED ORAL at 03:30

## 2024-05-23 RX ADMIN — DIAZEPAM 5 MG: 5 TABLET ORAL at 14:13

## 2024-05-23 RX ADMIN — OXYCODONE HYDROCHLORIDE 10 MG: 5 TABLET ORAL at 16:09

## 2024-05-23 RX ADMIN — GABAPENTIN 600 MG: 300 CAPSULE ORAL at 19:55

## 2024-05-23 RX ADMIN — SENNOSIDES AND DOCUSATE SODIUM 1 TABLET: 50; 8.6 TABLET ORAL at 19:55

## 2024-05-23 RX ADMIN — ACETAMINOPHEN 975 MG: 325 TABLET, FILM COATED ORAL at 23:30

## 2024-05-23 RX ADMIN — OXYCODONE HYDROCHLORIDE 5 MG: 5 TABLET ORAL at 19:56

## 2024-05-23 RX ADMIN — GABAPENTIN 600 MG: 300 CAPSULE ORAL at 09:53

## 2024-05-23 RX ADMIN — FAMOTIDINE 20 MG: 20 TABLET ORAL at 19:55

## 2024-05-23 RX ADMIN — CEFAZOLIN SODIUM 2 G: 2 INJECTION, SOLUTION INTRAVENOUS at 19:56

## 2024-05-23 RX ADMIN — SENNOSIDES 8.6 MG: 8.6 TABLET, FILM COATED ORAL at 11:24

## 2024-05-23 RX ADMIN — ACETAMINOPHEN 975 MG: 325 TABLET, FILM COATED ORAL at 09:53

## 2024-05-23 RX ADMIN — DIAZEPAM 5 MG: 5 TABLET ORAL at 03:30

## 2024-05-23 RX ADMIN — POLYETHYLENE GLYCOL 3350 17 G: 17 POWDER, FOR SOLUTION ORAL at 07:31

## 2024-05-23 RX ADMIN — OXYCODONE HYDROCHLORIDE 10 MG: 5 TABLET ORAL at 23:30

## 2024-05-23 RX ADMIN — Medication: at 09:52

## 2024-05-23 RX ADMIN — DIAZEPAM 5 MG: 5 TABLET ORAL at 08:31

## 2024-05-23 RX ADMIN — HYDROMORPHONE HYDROCHLORIDE 0.2 MG: 1 INJECTION, SOLUTION INTRAMUSCULAR; INTRAVENOUS; SUBCUTANEOUS at 17:48

## 2024-05-23 RX ADMIN — POLYETHYLENE GLYCOL 3350 17 G: 17 POWDER, FOR SOLUTION ORAL at 20:04

## 2024-05-23 RX ADMIN — HYDROXYZINE HYDROCHLORIDE 25 MG: 25 TABLET, FILM COATED ORAL at 06:18

## 2024-05-23 RX ADMIN — OXYCODONE HYDROCHLORIDE 10 MG: 5 TABLET ORAL at 08:31

## 2024-05-23 ASSESSMENT — ACTIVITIES OF DAILY LIVING (ADL)
ADLS_ACUITY_SCORE: 25
ADLS_ACUITY_SCORE: 21
ADLS_ACUITY_SCORE: 21
ADLS_ACUITY_SCORE: 25
ADLS_ACUITY_SCORE: 21
ADLS_ACUITY_SCORE: 25
ADLS_ACUITY_SCORE: 21
LEVEL_OF_INDEPENDENCE:_DRESS_LOWER_BODY: MINIMUM ASSIST (75% PATIENTS EFFORT)
ADLS_ACUITY_SCORE: 21
ADLS_ACUITY_SCORE: 25
ADLS_ACUITY_SCORE: 21
ADLS_ACUITY_SCORE: 21
LEVEL_OF_INDEPENDENCE:_EATING: INDEPENDENT
ADLS_ACUITY_SCORE: 21
LEVEL_OF_INDEPENDENCE:_GROOMING: STAND-BY ASSIST
ADLS_ACUITY_SCORE: 21
ADLS_ACUITY_SCORE: 25
ADLS_ACUITY_SCORE: 21
ADLS_ACUITY_SCORE: 25
ADLS_ACUITY_SCORE: 25
ADLS_ACUITY_SCORE: 21

## 2024-05-23 NOTE — PLAN OF CARE
Goal Outcome Evaluation:    Plan of Care Reviewed With: patient    Overall Patient Progress: improvingOverall Patient Progress: improving    Afebrile. Pain managed with scheduled tylenol, gabapentin, and diazepam with a continuous ketamine infusion. Pt still experiencing pain -- PRN oxycodone given per MAR. A&O x 4. Neuro checks intact. On RA. Sinus tachycardia on telemetry, some elevated blood pressures. Voiding adequately. No BM yet; bowel regimen continued. Tolerating diet. IV in L hand infiltrated with antibiotic --> removed and elevated arm with ice; vascular access aware and assessed patient. Worked with PT/OT and walked around halls. No other significant events. Mom at bedside and updated on plan of care. Transfer orders in; awaiting bed on adult med surg floor.

## 2024-05-23 NOTE — PROGRESS NOTES
I was asked to address code status with patient. After discussion, patient wishes to be full code.

## 2024-05-23 NOTE — PROGRESS NOTES
05/23/24 1245   Child Life   Location [facility] Unit 3 - PICU - post spinal fusion   Interaction Intent Introduction of Services   Method in-person   Individuals Present Patient;Caregiver/Adult Family Member   Intervention Facility Dog Intervention   Facility Dog Intervention Child life specialist and facility dog approached patient as he was ambulating in the hallway with physical therapy. Patient politely declinced interacting with/walking facility dog. Writers mother engaged in petting facility dog. Writer engaged mother in rapport building conversation. Mother shares that they have a red lab at home and typically patient is interested in engaging with dogs, however he is experiencing pain post surgery. Writer provided supportive listening and validation, writer offered to follow up later in the day to provide facility dog as a pain management coping tool. Mother agreeable that patient may be interested in this.    Writer and facility dog followed up with patient and mother to provide comfort/pain management support. Patient politely declined our services.    Distress appropriate;low distress   Distress Indicators staff observation   Outcomes/Follow Up Continue to Follow/Support   Time Spent   Direct Patient Care 5   Indirect Patient Care 5   Total Time Spent (Calc) 10

## 2024-05-23 NOTE — PROGRESS NOTES
[facility]    Progress Note - PICU Transfer Note       Date of Admission:  5/21/2024    Assessment & Plan   Hitesh Dobson is a 18yo M with history of kyphosis and thoracic disc herniation who underwent PISF T3-L1 w/ SPOs T6-11 8-9 TTIF with Dr. Torres on 5/21/24. He was initially admitted to the PICU post-operatively while on phenylephrine and lidocaine infusions. He is following an uncomplicated post-operative course. He has been off vasopressors, lidocaine infusion and surgical drain to be removed 5/23; appropriate for transfer to general adult floor. He will be managed by Orthopaedics primary team with hospitalist team consulted.         Respiratory:  - no active issues     Cardiovascular:  - no active issues     FEN/GI:  - Taking good PO  - Increase bowel regimen to Miralax BID + Senna BID     Hematology:  - Hbg 13.2 on 5/22; no further rechecks necessary unless clinical change     Infectious Disease:  - perioperative cefazolin continuing until surgical drain removed (ortho planning to remove drain 5/23)     Neurologic:  - Adult IP Pain Team consulted  - scheduled acetaminophen 975mg q6hr  - initially on dilaudid PCA post-operatively, not providing adequate relief so will switch to ketamine infusion @ 5mg/hr--can increased to 10mg/hr if needed  - oxycodone 5-10mg PO q3hr PRN  - diazepam 5mg PO q6hr  - gabapentin 600mg TID  - PT/OT consults        Diet: Snacks/Supplements Adult: Emanuel; Between Meals  Peds Diet Age 9-18 yrs    DVT Prophylaxis: Pneumatic Compression Devices  Sumner Catheter: Not present  Fluids: None  Lines: None     Cardiac Monitoring: None  Code Status:  Full code    Clinically Significant Risk Factors                                    Disposition Plan     Expected Discharge Date: 05/28/2024                The patient's care was discussed with the Attending Physician, Dr. Mclaughlin and Chief Resident/Fellow.    Fior English MD  PGY2 Med/Peds  M  [facility]  Securely message with [employer] (more info)  Text page via [employer] Paging/Directory   See signed in provider for up to date coverage information  ______________________________________________________________________    Interval History   Pain not well controlled overnight. Dilaudid PCA basal rate increased and gabapentin started. Taking good PO, urinating appropriately. Has not had BM yet.    Physical Exam   Vital Signs: Temp: 98.4  F (36.9  C) Temp src: Oral BP: (!) 143/94 Pulse: (!) 131   Resp: 11 SpO2: 97 % O2 Device: None (Room air)    Weight: 147 lbs 14.86 oz    Constitutional: awake, alert, cooperative, no apparent distress, and appears stated age  Respiratory: No increased work of breathing, good air exchange, clear to auscultation bilaterally, no crackles or wheezing  Cardiovascular: tachycardic, regular rhythm, normal S1 and S2, no murmur noted, and no edema  GI: No scars, normal bowel sounds, soft, non-distended, non-tender, no masses palpated, no hepatosplenomegally  Skin: normal skin color, texture, turgor  Musculoskeletal: no lower extremity pitting edema present  Neurologic: Alert and oriented. Answers questions appropriately. Moving all extremities.     Medical Decision Making       Please see A&P for additional details of medical decision making.      Data     I have personally reviewed the following data over the past 24 hrs:    N/A  \   N/A   / N/A     140 100 10.1 /  94   4.1 24 0.85 \     ALT: N/A AST: N/A AP: N/A TBILI: N/A   ALB: 4.3 TOT PROTEIN: N/A LIPASE: N/A       Imaging results reviewed over the past 24 hrs:   No results found for this or any previous visit (from the past 24 hour(s)).

## 2024-05-23 NOTE — PROGRESS NOTES
"Orthopaedic Surgery Progress Note:       Subjective:   No acute overnight events.  Pain still significant but manageable.  No bowel movement yet.  Ambulating in hallways.  Denies any chest pain short of few chills nausea vomiting.    Objective:   /70   Pulse 117   Temp 98.5  F (36.9  C) (Oral)   Resp (!) 8   Ht 1.854 m (6' 1\")   Wt 66.4 kg (146 lb 6.2 oz)   SpO2 95%   BMI 19.31 kg/m    I/O this shift:  In: 3 [I.V.:3]  Out: -   Gen: NAD. Resting comfortably in bed  Resp: Breathing comfortably on RA  : Sumner in place    Drain in place 15 cc out since surgery  Dressing cdi apart from 2 small areas of drainage approximately the size of a dime, continue to monitor       Lumbar Spine:    Appearance - No gross stepoffs or deformities    Motor -     L2-3: Hip flexion 5/5 R and 5/5 L strength          L3/4:  Knee extension R 5/5 and L 5/5 strength         L4/5:  Foot dorsiflexion R 5/5 L 5/5 and       EHL dorsiflexion R 5/5 L 5/5 strength         S1:  Plantarflexion/Peroneal Muscles  R 5/5 and L 5/5 strength    Sensation: intact to light touch L3-S1 distribution BLE        Labs:  Lab Results   Component Value Date    WBC 14.8 (H) 05/22/2024    HGB 13.2 (L) 05/22/2024     05/22/2024     Assessment and Plan: Hitesh Dobson is a 19 year old male with PMH including scheuermann's kyphosis, thoracic disc herniation now s/p PISF T3-L1 w/ SPOs T6-11 8-9 TTIF with Dr. Torres on 5/21/24.      After discussion with Dr. Sharath Mendiola of pain team, following recommendation added oxycodone 5-10 q 3 PRN.   Per RN report, yesterday, suspect patient did not tolerate lidocaine yesterday (ectopy,) therefore, Ketamine started at 5mg/hr. Keep Lidocaine off.  Stop PCA today. Can escalate oxycodone later today as needed or switch to oral dilaudid.  Consider resuming methocarbamol    Encourage p.o.  Bowel regimen, no bowel movement yet  Remove drain today once drain removed standing scoliosis films to be obtained  May transfer out " of ICU today.      Ortho (Spine) Primary  Activity:   - Up with assist until independent. No excessive bending or twisting. No lifting >10 lbs x 6 weeks.   - If Lucie lift required for mobilization, please use high-back sling rather than universal sling. This is to minimize spine flexion.  Weight bearing status: WBAT.  Pain management:   - Per pain team, stop PCA, begin ketamine  - Transition from IV to PO narcotics. No NSAIDs x 3 months.   Antibiotics: Ancef 2g Q8hrs until deep drain removed  Diet: Begin with clear fluids and progress diet as tolerated.   DVT prophylaxis: SCDs only. No chemical DVT ppx needed.  Imaging: XR scoli PTDC - ordered.  Labs: Hgb POD#1,2.  Bracing/Splinting: None.  Dressings: Keep Aquacel c/d/i x 7 days.  Drains: Document output per shift, will be discontinued at Orthopedic Surgery discretion.  Sumner catheter: Remove POD#1,2.  Physical Therapy/Occupational Therapy: Eval and treat.  Cultures: none.    Consults: Consult IP team when able to discharge from ICU   Follow-up: Clinic with Dr. Torres in 6 weeks with repeat x-rays.   Disposition: Pending progress with therapies, pain control on orals, and medical stability, anticipate discharge to home on POD #5-7.    Pilo Craft DO  Spine Fellow  Addended by AAMIR Colin

## 2024-05-23 NOTE — PROGRESS NOTES
[facility]    Pediatric Critical Care Progress Note   Date of Service (when I saw the patient): 05/23/2024    Interval Changes:  Up and out of bed several times yesterday. Lidocaine discontinued in the afternoon in the setting of ectopy. Electrolytes normal. Pain inadequately controlled overnight.    Assessment:  Hitesh is a 19 year old with Schuermann's kyphosis who is convalescing following posterior spinal fusion. Today is POD#2.       Plan by Systems:    Respiratory:  - no active issues  - encourage IS and OOB as tolerated    Cardiovascular:  - no active issues    FEN/GI:  - encourage PO intake  - increase bowel regimen  - goal fluid balance even    Hematology:  - no active issues    Infectious Disease:  - perioperative cefazolin to continue until drain removed (expected today)    Endocrine:  - no active issues    Neurologic:  - scheduled acetaminophen  - transition hydromorphone PCA to ketamine infusion per input of adult pain team  - enteral oxycodone as needed for breakthrough  - diazepam as needed for spasm  - continue gabapentin  - encourage environmental measures for delirium prevention and trend CAPD    Rehabilitation:  - encourage participation with PT, OT as able    Lines and Tubes:  - Sumner catheter: removed (5/21-5/22)  - Central lines: removed (5/21-5/22)  - Arterial line: removed (5/21-5/22)  - deep drain: placed intra-op, likely to be removed today    Vitals:  All vital signs reviewed  Vitals:    05/21/24 0619 05/22/24 1100   Weight: 67.2 kg (148 lb 2.4 oz) 66.4 kg (146 lb 6.2 oz)       Physical Exam:    GENERAL: Sleeping, appears uncomfortable.   SKIN: Clear. No significant rash or lesions beyond baseline on exposed skin. Back incision covered; two spots of dried blood on bandaging.  HEAD: Atraumatic.  EYES:  Normal conjunctivae.  NOSE: Normal without discharge.  MOUTH/THROAT: Clear. No oral lesions. Teeth without obvious  abnormalities.  NECK: Supple, no masses.  LUNGS: CTAB. No increased work of breathing.  HEART: Tachycardic. Normal S1/S2. No murmurs. Normal pulses.  ABDOMEN: Soft, non-tender, not distended, no appreciable masses or hepatosplenomegaly. Minimal bowel sounds.   EXTREMITIES: Full range of motion, no deformities. Grossly normal bulk and tone.  NEUROLOGIC: Moves all extremities in response to examination.     Review of Systems:  A complete review of systems was performed and is negative except as noted in the assessment and interval changes.    Data:  All nursing notes, medications, radiological studies, and laboratory values reviewed.    Communication:  No awake family at bedside this morning. Hitesh's primary care provider will be updated before discharge.     I spent a total of 60 minutes providing care at the bedside and on the unit, evaluating the patient, directing care, reviewing laboratory values and radiologic reports, and completing documentation for Hitesh Dobson.    Nakia Mclaughlin MD  Pediatric Critical Care

## 2024-05-23 NOTE — CONSULTS
"Consult received for Vascular access care.    VAT assessed pt's left hand and wrist for possible infiltration of ancef.     Hand marked with skin marker. Size ratio at 25%, does not meet rational for hyaluronidase treatment at this time.     However due to swelling, VAT would recommend possible hyaluronidase and assessing every 4 hours for 24 hours.      For additional needs place \"Nursing to Consult for Vascular Access\" NOR522 order in [facility].  "

## 2024-05-23 NOTE — CONSULTS
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 Torres on 5/21/24.    Patient with poor pain-control post-operatively, currently 9/10. Lidocaine gtt discontinued by primary team due to concern for possible ectopy. Discussed with the patient utilizing po pain medications and starting ketamine infusion instead of PCA dilaudid, which he agrees with.     Plan:   Start ketamine infusion 5 mg/hr (could increase to 10 mg/hr if needed)  Discontinue Dilaudid PCA   Start Oxycodone 5 - 10 mg po Q3H PRN  Continue scheduled Gabapentin 600 mg TID   Continue scheduled Tylenol 975 mg Q6H   Consider discontinuing scheduled diazepam and starting Robaxin PRN instead.   Bowel Regimen     Thank you for the opportunity to participate in the care of Hitesh Dobson  Pain Service will continue to follow.    Discussed with attending anesthesiologist  Primary Service Contacted with Recommendations? Yes    Venita Michelle DO, PGY-2/CA-1  Anesthesia Resident   Inpatient Pain Service   Contact via [employer]     Chief Complaint:  Back pain    History of Present Illness:  Hitesh Dobson is a 19 year old male with a history of diffuse back pain in his neck, mid and low back, and hips attributed to his history of Scheuermann's kyphosis and thoracic disc herniation. He underwent posterior spinal fusion on 5/21 and his pain has been  "\"pretty bad\". He currently rates his pain 9/10. He also complains of feeling very tried and \"groggy\", noting getting poor sleep. His mother who is at bedside states he keeps falling asleep and has not been eating due to this. He reports feeling nauseous when he got up to pee this morning but this has since resolved.  Per chart review, the patient has had chronic pain for the past few years and is not opioid dependent. He is a student at [employer] and otherwise rather active.     No Rx. Patient is opioid naive.     Pain Assessment:   Description of Pain: Constant  Location: back  Current Pain: 9/10  Baseline Pain Level: 3/10 per outside hospital chart review      Past Medical History:  Past Medical History:   Diagnosis Date    Herniated thoracic disc without myelopathy     Scheuermann's kyphosis     Dick-Ranjit syndrome (H24) 2020    secondary to mycoplasma pneumonia in spring of 2020.         Family History:    Family History   Problem Relation Age of Onset    Other - See Comments Father        Social History:  Social History     Tobacco Use    Smoking status: Never     Passive exposure: Never    Smokeless tobacco: Never   Substance Use Topics    Alcohol use: Yes     Comment: 1 -2 drinks on wkd's 'not alway's'             Review of Systems:  Complete ROS reviewed. Unless otherwise noted, all other systems found to be negative.        Laboratory Results:  Recent Labs   Lab Test 05/22/24  1516 05/22/24  0449   PLT  --  211   BUN 10.1  --        Allergies:  Allergies   Allergen Reactions    Eye Drops Allergy Relief [Tetrahydrozoline-Zn Sulfate]      Other reaction(s): rhinitis         Current Pain Related Medications:  Medications related to Pain Management (From now, onward)      Start     Dose/Rate Route Frequency Ordered Stop    05/23/24 1000  gabapentin (NEURONTIN) capsule 600 mg         600 mg Oral 3 TIMES DAILY 05/23/24 0424      05/23/24 0930  HYDROmorphone (DILAUDID) PCA 0.2 mg/mL OPIOID TOLERANT          " "Intravenous CONTINUOUS 05/23/24 0913      05/23/24 0830  [Held by provider]  ketamine (KETALAR) 2 mg/mL in sodium chloride 0.9 % 50 mL ANALGESIA infusion        (On hold since today at 0907 until manually unheld; held by Nakia Mclaughlin MDHold Reason: Other)    5 mg/hr  2.5 mL/hr  Intravenous CONTINUOUS 05/23/24 0823      05/23/24 0756  [Held by provider]  oxyCODONE (ROXICODONE) tablet 5 mg        (On hold since today at 0927 until manually unheld; held by Nakia Mclaughlin MDHold Reason: Other)   Placed in \"Or\" Linked Group    5 mg Oral EVERY 3 HOURS PRN 05/23/24 0801      05/23/24 0756  [Held by provider]  oxyCODONE (ROXICODONE) tablet 10 mg        (On hold since today at 0927 until manually unheld; held by Nakia Mclaughlin MDHold Reason: Other)   Placed in \"Or\" Linked Group    10 mg Oral EVERY 3 HOURS PRN 05/23/24 0801      05/23/24 0414  hydrOXYzine HCl (ATARAX) tablet 25 mg        Placed in \"Or\" Linked Group    25 mg Oral EVERY 6 HOURS PRN 05/23/24 0414      05/23/24 0414  hydrOXYzine HCl (ATARAX) tablet 50 mg        Placed in \"Or\" Linked Group    50 mg Oral EVERY 6 HOURS PRN 05/23/24 0414      05/22/24 2100  diazepam (VALIUM) tablet 5 mg         5 mg Oral EVERY 6 HOURS 05/22/24 2020      05/22/24 1434  [Held by provider]  methocarbamol (ROBAXIN) tablet 750 mg        (On hold since yesterday at 1456 until manually unheld; held by Fior English MDHold Reason: Other)    750 mg Oral 4 TIMES DAILY PRN 05/22/24 1434      05/22/24 1230  acetaminophen (TYLENOL) tablet 975 mg         975 mg Oral EVERY 6 HOURS 05/22/24 1204      05/21/24 2000  sennosides (SENOKOT) tablet 8.6 mg         8.6 mg Oral 2 TIMES DAILY PRN 05/21/24 1842      05/21/24 1502  lidocaine (LMX4) cream          Topical EVERY 1 HOUR PRN 05/21/24 1509      05/21/24 1353  lidocaine 1 % 0.1-1 mL         0.1-1 mL Other EVERY 1 HOUR PRN 05/21/24 1400      05/21/24 1353  lidocaine (LMX4) cream          Topical EVERY 1 HOUR PRN 05/21/24 1400      05/21/24 " "0200  polyethylene glycol (MIRALAX) Packet 17 g         17 g Oral DAILY 05/21/24 1842                Physical Exam:  Vitals: BP (!) 143/94   Pulse (!) 137   Temp 98.5  F (36.9  C) (Oral)   Resp 19   Ht 1.854 m (6' 1\")   Wt 66.4 kg (146 lb 6.2 oz)   SpO2 97%   BMI 19.31 kg/m      Physical Exam:   CONSTITUTIONAL/GENERAL APPEARANCE:  NAD, sitting upright in bed. Tired appearing.   EYES: EOMI, sclera anicteric, PERRLA  ENT/NECK: atraumatic, lips and oral mucous membranes dry  RESPIRATORY: non-labored breathing. No cough, wheeze  CV: tachycardic rate but normal rhythm on monitor  MUSCULOSKELETAL/BACK/SPINE/EXTREMITIES: Moves all extremities purposefully.   NEURO: Alert and Oriented x3. Answers questions appropriately  SKIN/VASCULAR EXAM:  No jaundice, no visible rashes or lesions      Acute Inpatient Pain Service [facility]  Hours of pain coverage 24/7   Page via [employer]- Please Page the Pain Team Via [employer]: \"PAIN MANAGEMENT ACUTE INPATIENT/ [facility]\"           "

## 2024-05-23 NOTE — CONSULTS
[facility]  Consult Note - Hospitalist Service, GOLD TEAM 16  Date of Admission:  5/21/2024  Consult Requested by: Orthospine  Reason for Consult: Medical comanagement    Assessment & Plan   Hitesh Dobson is a 19 year old male admitted on 5/21/2024.  He has a known history of Scheuermann's kyphosis, thoracic discrimination for which he underwent PSF T3-L1 with SPO's, T6-11, T 8-9 discectomy, transforaminal interbody fusion with Dr. Torres on 5/21/2024 with postop course complicated by shock for which patient required phenylephrine and admission in pediatric ICU.  Patient has been weaned off phenylephrine, and is now transferred to internal medicine for ongoing medical comanagement.  His course has also been complicated by expected postop pain for which the pain team is following along.    # S/p PSF T3-L1 with SPO's, T6-11, T 8-9 discectomy, transforaminal interbody fusion with Dr. Torres  - ortho spine is primary: Weightbearing as tolerated, SCIDS only for DVT prophylaxis, PT, OT consult.  -Patient to remain on IV Ancef while drain in place.  - pain control-wise, pain team is following recommending:  -Now on ketamine gtt  -Dilaudid PCA discontinued  -Oxycodone 5-10 mg p.o. every 3 hours as needed  -Gabapentin 600 mg 3 times daily  -Tylenol 975 mg every 6 hours  -Agree with discontinuing Valium, try Robaxin instead as patient appears somnolent on evaluation.  -Aggressive bowel regimen with MiraLAX and Radha-Colace.  Encourage use of Dulcolax as well.    # Tachycardia-likely related to pain.  Of note is not hypoxic and denies any pleuritic chest pain making PE less likely.  Will continue to monitor.         Clinically Significant Risk Factors                                    MATTHIAS TALLEY MD  Hospitalist Service, GOLD TEAM 16  Securely message with [employer] (more info)  Text page via [employer] Paging/Directory   See signed in provider for up to date coverage  information  ______________________________________________________________________        History of Present Illness   Hitesh Dobson is a 19 year old male admitted on 5/21/2024.  He has a known history of Scheuermann's kyphosis, thoracic discrimination for which he underwent PSF T3-L1 with SPO's, T6-11, T 8-9 discectomy, transforaminal interbody fusion with Dr. Torres on 5/21/2024 with postop course complicated by shock for which patient required phenylephrine and admission in pediatric ICU.  Patient has been weaned off phenylephrine, and is now transferred to internal medicine for ongoing medical comanagement.  His course has also been complicated by expected postop pain for which the pain team is following along.  On evaluation, patient's main complaints is regarding ongoing pain along the incision site.  He denies any numbness or tingling in his hands or feet.  He rates his pain a 8/10 on a scale of 1-10.  He denies any difficulty breathing, no chest pain, no pain with deep breaths.  He has not been passing flatus and has also moved his bowels since surgery.      Past Medical History    Past Medical History:   Diagnosis Date    Herniated thoracic disc without myelopathy     Scheuermann's kyphosis     Dick-Ranjit syndrome (H24) 2020    secondary to mycoplasma pneumonia in spring of 2020.       Past Surgical History   Past Surgical History:   Procedure Laterality Date    EXTRACTION(S) DENTAL      OPTICAL TRACKING SYSTEM FUSION POSTERIOR SPINE THORACIC THREE+ LEVELS N/A 5/21/2024    Procedure: Posterior Spinal Fusion Thoracic 3-Lumbar 1, Segmental Instrumentation, Hernandez Gallo Osteotomies Thoracic 6-11, thoracic 8-9 discetomy, transforaminal interbody fusion, insert intervertebral device;  Surgeon: Jayson Torres MD;  Location: [facility] OR       Medications   I have reviewed this patient's current medications  Medications Prior to Admission   Medication Sig Dispense Refill Last Dose    multivitamin w/minerals  (MULTI-VITAMIN) tablet Take 1 tablet by mouth daily When remember's   Past Month             Physical Exam   Vital Signs: Temp: 98.4  F (36.9  C) Temp src: Oral BP: 133/69 Pulse: (!) 135   Resp: 14 SpO2: 96 % O2 Device: None (Room air)    Weight: 147 lbs 14.86 oz    General Appearance: Lying in bed, on room air, tired appearing in no acute distress or discomfort  HEENT: PERRL: EOMI; moist mucous membrane w/o lesions  Neck: No JVD  Pulmonary: Normal work of breathing, normal respiratory rate, no use of accessory muscles.  CVS: Regular rhythm, no murmurs, rubs or gallops, borderline tachycardic with heart rate ranging from 90s-100s.  GI: BS (+), soft nontender, no rebound or guarding   Extremities: No LE edema.  Skin: No rashes or lesions  Neurologic: A&O x3      Medical Decision Making       55 MINUTES SPENT BY ME on the date of service doing chart review, history, exam, documentation & further activities per the note.      Data   ------------------------- PAST 24 HR DATA REVIEWED -----------------------------------------------        Imaging results reviewed over the past 24 hrs:   No results found for this or any previous visit (from the past 24 hour(s)).

## 2024-05-23 NOTE — PLAN OF CARE
Afebrile, drowsy overnight. Pain consistently rated 7-9/10. Medications administered, pt remains on dilaudid PCA, basal rate increased to 0.6mg/hr. Switched to PO valium. Tachycardic, HR elevates to 140s when moving. Occasional ectopy noted. Intermittently hypertensive to systolics 130s . Occasional desaturation to 85-88%, resolves quickly without intervention within 10 seconds. No BM overnight, intermittent nausea when eating and standing. Pt ambulated to the bathroom x2 with assist x1. UOP adequate. Mother present at bedside in the evening, updated on plan of care.

## 2024-05-23 NOTE — PROGRESS NOTES
ADMISSION to [address] [place] [address] UNIT:    Hitesh Dobson was admitted from PICU for spine surgery.  2 RN skin assessment: completed by Krystyna Brunner RN  Result of skin assessment and interventions/actions: n/a  Height, weight: completed? Yes, completed earlier in day  Patient belongings & admission documents: see Flowsheets, completed? yes  MDRO education added to care plan: n/a    --------------------------------------------------------------  Pt rates pain 9/10 despite appearing sedated. Pt has continuous ketamine running and received PRN oxycodone prior to admission. Pt wakes to vocal stimulation. A&Ox4, CMS intact, LS CTA, denies SOB, tachy, good appetite. Received PRN dose of IV dilaudid. Pharmacist contacted about med reconciliation. Pt has no code status, cross cover notified.

## 2024-05-23 NOTE — PROGRESS NOTES
05/23/24 1100   Appointment Info   Signing Clinician's Name / Credentials (OT) Ale Alan OTR/L   Rehab Comments (OT) spinal   Quick Adds   Type of Visit Initial Inpatient Occupational Therapy Evaluation   Living Environment   People in Home parent(s)   Current Living Arrangements house   Home Accessibility stairs within home   Number of Stairs, Within Home, Primary greater than 10 stairs   Stair Railings, Within Home, Primary none   Transportation Anticipated car, drives self   Disability/Function   Hearing Difficulty or Deaf no   Wear Glasses or Blind yes   Vision Management glasses   Concentrating, Remembering or Making Decisions Difficulty no   Difficulty Communicating no   Difficulty Eating/Swallowing no   Walking or Climbing Stairs Difficulty no   Dressing/Bathing Difficulty no   Toileting issues no   Doing Errands Independently Difficulty (such as shopping) no   Equipment Currently Used at Home none   Change in Functional Status Since Onset of Current Illness/Injury yes   General Information   Onset of Illness/Injury or Date of Surgery 05/21/24   Referring Physician Janet Taveras MD   Patient/Family Goals  return to prior level of function;increase upper body strength;progress activities of daily living   Additional Occupational Profile Info/Pertinent History of Current Problem Per MD: Hitesh is a 19 year old with Schuermann's kyphosis who remains critically ill following posterior spinal fusion   Parent/Caregiver Involvement Attentive to pt needs   Existing Precautions/Restrictions spinal   LUE Weight-Bearing Status partial weight-bearing (% in comments)  (10 lb lifting restriction)   RUE Weight-Bearing Status partial weight-bearing (% in comments)  (10 lb lifting restriction)   LLE Weight-Bearing Status full weight-bearing  (10 lb lifting restriction)   RLE Weight-Bearing Status full weight-bearing   Cognitive Status Examination   Orientation Status orientation to person, place and time   Level  of Consciousness alert   Follows Commands and Answers Questions 100% of the time   Personal Safety and Judgment intact   Behavior   Behavior cooperative   Visual Perception   Visual Perception no deficits were identified   Functional Vision   Functional Vision No concerns   Pain Assessment   Patient Currently in Pain Yes, see Vital Sign flowsheet   Posture   Posture deficits were identified   Posture: Deficits Identified rounded shoulders   Range of Motion (ROM)   Range of Motion  Range of Motion is limited   Cervical Range of Motion  limited due to spinal precautions to no bending lifting or twisting   Trunk Range of Motion  limited due to spinal precautions to no bending lifting or twisting   Lower Extremity Range of Motion  functional for figure four dressing.   Strength   Manual Muscle Testing Results Strength is functional   Muscle Tone Assessment   Muscle Tone Tone is within normal limits   Gross Motor Coordination   Gross Motor Skills Standing;Gait   Gross Motor Skill Comments standing with SBA for balance and SBA for short ambulation distance this session.   Transfer Skills and Mobility   Transfer  Bed to Chair/ Chair to Bed Transfers;Sit to Stand/Stand to Sit Transfers;Toilet Transfers;Tub/shower Transfers   Bed to Chair/ Chair to Bed Transfers SBA to CGA provided   Sit to Stand/Stand to Sit Transfers SBA provided   Toilet Transfers completed this morning with SBA by RN to bathroom   Tub/shower Transfers tub shower at home   Balance   Balance deficits identified   Balance Deficits Standing balance: Dynamic   ADL's   ADL Quickadds lower body dressing;grooming;eating/feeding   Lower Body Dressing   Level of [place]: Dress Lower Body minimum assist (75% patients effort)   Physical Assist/Nonphysical Assist: Dress Lower Body set-up required;verbal cues;nonverbal cues (demo/gestures)  (figure four demonstration)   Grooming   Level of [place]: Grooming stand-by assist   Physical Assist/Nonphysical  Assist: Grooming set-up required;1 person assist;other (see comments)  (edu on spitting into a cup or bag to limit bending)   Eating/Self-Feeding   Level of [place]: Eating independent   General Therapy Interventions   Planned Therapy Interventions Therapeutic Activities;Self Care/ Home Management;Therapeutic Procedure   Clinical Impression   Criteria for Skilled Therapeutic Interventions Met (OT) Yes, treatment indicated   OT Diagnosis self care function impairment   Influenced by the following impairments ROM;pain;malaise   Assessment of Occupational Performance 1-3 Performance Deficits   Clinical Decision Making (Complexity) Low complexity   Risk & Benefits of therapy have been explained evaluation/treatment results reviewed;care plan/treatment goals reviewed   OT Total Evaluation Time   OT Eval, Low Complexity Minutes (26344) 8   OT Goals   Therapy Frequency (OT) Daily   OT Predicted Duration/Target Date for Goal Attainment 06/23/24   OT Goals Hygiene/Grooming;Upper Body Dressing;Lower Body Dressing;Upper Body Bathing;Lower Body Bathing;Toilet Transfer/Toileting;OT Goal 1   OT: Hygiene/Grooming modified independent   OT: Upper Body Dressing Modified independent   OT: Lower Body Dressing Modified independent   OT: Upper Body Bathing Modified independent   OT: Lower Body Bathing Modified independent   OT: Toilet Transfer/Toileting Modified independent   OT: Goal 1 Pt will verbalize 100% of education, spinal precautions and education provided across 2 consecutive sessions.   Interventions   Interventions Quick Adds Self-Care/Home Management;Therapeutic Activity   Self-Care/Home Management   Self-Care/Home Mgmt/ADL, Compensatory, Meal Prep Minutes (14076) 25   Treatment Detail/Skilled Intervention Pt sitting in inclined bed eating breakfast upon arrival to the session with IND this session after set up assist. Pt sitting EOB and donning shorts with CGA to min A provided with pt demonstrating good understanding  of education provided on figure four dressing and implementing. Pt engaged in standing EOS with SBA provided to brush teeth implementing use of a bucket to spit into to limit bending. Pt transitioned to chair and donned new socks implementing figure four dressing with SBA provided. Increased time due to increased fatigue throughout the session.   Therapeutic Activities   Therapeutic Activity Minutes (80451) 8   Treatment Detail/Skilled Intervention Facilitated bed mobility this session with pt transitioning from supine in bed to sitting implementing log rolling positioning. Pt sitting EIB with supervision provided. Pt ambulated ~5 ft to sink with CGA provided. Pt demonstrating increased signs of physical stress demonstrating increased calmness, sweating and nausea throughout this session. Pt upright in chair with PT and VSS upon clinician transitioning out of the session.   OT Discharge Planning   OT Plan tub shower transfer, g/h, LE dressing, UE dressing, activity tolerance, spinal handout/education.   OT Discharge Recommendation (DC Rec) home with assist   OT Rationale for DC Rec due to complex surgery pt discharging to home with caregiver assistance   OT Brief overview of current status pt completing figure four dressing and standing of EOS for g/h   Total Session Time   Timed Code Treatment Minutes 33   Total Session Time (sum of timed and untimed services) 41

## 2024-05-23 NOTE — PLAN OF CARE
Goal Outcome Evaluation:      Pt afebrile. Neuros intact. No S/S of lidocaine toxicity. Rating pain 7-10. . Given PRN valium x1 and encouraging PCA bolus use. LS clear on RA. Drinking well, advanced to regular diet. Due to void. Spinal incision CDI, small amounts of bloody output from drain. Mom at bedside, updated on POC.

## 2024-05-24 ENCOUNTER — APPOINTMENT (OUTPATIENT)
Dept: GENERAL RADIOLOGY | Facility: CLINIC | Age: 19
End: 2024-05-24
Attending: STUDENT IN AN ORGANIZED HEALTH CARE EDUCATION/TRAINING PROGRAM
Payer: COMMERCIAL

## 2024-05-24 ENCOUNTER — APPOINTMENT (OUTPATIENT)
Dept: PHYSICAL THERAPY | Facility: CLINIC | Age: 19
End: 2024-05-24
Attending: ORTHOPAEDIC SURGERY
Payer: COMMERCIAL

## 2024-05-24 ENCOUNTER — DOCUMENTATION ONLY (OUTPATIENT)
Dept: MEDSURG UNIT | Facility: CLINIC | Age: 19
End: 2024-05-24

## 2024-05-24 LAB
GLUCOSE BLDC GLUCOMTR-MCNC: 117 MG/DL (ref 70–99)
HGB BLD-MCNC: 13.2 G/DL (ref 13.3–17.7)

## 2024-05-24 PROCEDURE — 250N000013 HC RX MED GY IP 250 OP 250 PS 637: Performed by: NURSE PRACTITIONER

## 2024-05-24 PROCEDURE — 93005 ELECTROCARDIOGRAM TRACING: CPT

## 2024-05-24 PROCEDURE — 250N000009 HC RX 250: Performed by: STUDENT IN AN ORGANIZED HEALTH CARE EDUCATION/TRAINING PROGRAM

## 2024-05-24 PROCEDURE — 85018 HEMOGLOBIN: CPT | Performed by: STUDENT IN AN ORGANIZED HEALTH CARE EDUCATION/TRAINING PROGRAM

## 2024-05-24 PROCEDURE — 97116 GAIT TRAINING THERAPY: CPT | Mod: GP

## 2024-05-24 PROCEDURE — 93010 ELECTROCARDIOGRAM REPORT: CPT | Performed by: INTERNAL MEDICINE

## 2024-05-24 PROCEDURE — 97530 THERAPEUTIC ACTIVITIES: CPT | Mod: GP

## 2024-05-24 PROCEDURE — 250N000013 HC RX MED GY IP 250 OP 250 PS 637

## 2024-05-24 PROCEDURE — 250N000013 HC RX MED GY IP 250 OP 250 PS 637: Performed by: STUDENT IN AN ORGANIZED HEALTH CARE EDUCATION/TRAINING PROGRAM

## 2024-05-24 PROCEDURE — 250N000013 HC RX MED GY IP 250 OP 250 PS 637: Performed by: PHYSICIAN ASSISTANT

## 2024-05-24 PROCEDURE — 999N000065 XR SPINE COMPLETE SCOLIOSIS 2 VIEWS

## 2024-05-24 PROCEDURE — 99232 SBSQ HOSP IP/OBS MODERATE 35: CPT | Performed by: INTERNAL MEDICINE

## 2024-05-24 PROCEDURE — 250N000011 HC RX IP 250 OP 636: Performed by: STUDENT IN AN ORGANIZED HEALTH CARE EDUCATION/TRAINING PROGRAM

## 2024-05-24 PROCEDURE — 36415 COLL VENOUS BLD VENIPUNCTURE: CPT | Performed by: STUDENT IN AN ORGANIZED HEALTH CARE EDUCATION/TRAINING PROGRAM

## 2024-05-24 PROCEDURE — 258N000003 HC RX IP 258 OP 636: Performed by: STUDENT IN AN ORGANIZED HEALTH CARE EDUCATION/TRAINING PROGRAM

## 2024-05-24 PROCEDURE — 120N000002 HC R&B MED SURG/OB UMMC

## 2024-05-24 RX ORDER — LIDOCAINE HYDROCHLORIDE AND EPINEPHRINE 10; 10 MG/ML; UG/ML
50 INJECTION, SOLUTION INFILTRATION; PERINEURAL ONCE
Status: DISCONTINUED | OUTPATIENT
Start: 2024-05-24 | End: 2024-05-27 | Stop reason: HOSPADM

## 2024-05-24 RX ORDER — HYDROMORPHONE HYDROCHLORIDE 4 MG/1
4-6 TABLET ORAL
Qty: 62 TABLET | Refills: 0 | Status: SHIPPED | OUTPATIENT
Start: 2024-05-24 | End: 2024-06-03

## 2024-05-24 RX ORDER — GABAPENTIN 300 MG/1
600 CAPSULE ORAL 3 TIMES DAILY
Qty: 60 CAPSULE | Refills: 0 | Status: SHIPPED | OUTPATIENT
Start: 2024-05-24 | End: 2024-05-27

## 2024-05-24 RX ORDER — METHOCARBAMOL 750 MG/1
750 TABLET, FILM COATED ORAL 4 TIMES DAILY
Status: DISCONTINUED | OUTPATIENT
Start: 2024-05-24 | End: 2024-05-26

## 2024-05-24 RX ORDER — HYDROMORPHONE HYDROCHLORIDE 2 MG/1
6 TABLET ORAL
Status: DISCONTINUED | OUTPATIENT
Start: 2024-05-24 | End: 2024-05-27 | Stop reason: HOSPADM

## 2024-05-24 RX ORDER — HYDROMORPHONE HYDROCHLORIDE 2 MG/1
4 TABLET ORAL
Status: DISCONTINUED | OUTPATIENT
Start: 2024-05-24 | End: 2024-05-27 | Stop reason: HOSPADM

## 2024-05-24 RX ADMIN — CEFAZOLIN SODIUM 2 G: 2 INJECTION, SOLUTION INTRAVENOUS at 20:14

## 2024-05-24 RX ADMIN — GABAPENTIN 600 MG: 300 CAPSULE ORAL at 13:24

## 2024-05-24 RX ADMIN — ACETAMINOPHEN 975 MG: 325 TABLET, FILM COATED ORAL at 08:27

## 2024-05-24 RX ADMIN — ACETAMINOPHEN 975 MG: 325 TABLET, FILM COATED ORAL at 23:57

## 2024-05-24 RX ADMIN — POLYETHYLENE GLYCOL 3350 17 G: 17 POWDER, FOR SOLUTION ORAL at 08:29

## 2024-05-24 RX ADMIN — HYDROMORPHONE HYDROCHLORIDE 6 MG: 2 TABLET ORAL at 15:49

## 2024-05-24 RX ADMIN — GABAPENTIN 600 MG: 300 CAPSULE ORAL at 20:02

## 2024-05-24 RX ADMIN — HYDROMORPHONE HYDROCHLORIDE 6 MG: 2 TABLET ORAL at 23:57

## 2024-05-24 RX ADMIN — METHOCARBAMOL 750 MG: 750 TABLET ORAL at 05:28

## 2024-05-24 RX ADMIN — METHOCARBAMOL 750 MG: 750 TABLET ORAL at 20:02

## 2024-05-24 RX ADMIN — POLYETHYLENE GLYCOL 3350 17 G: 17 POWDER, FOR SOLUTION ORAL at 20:02

## 2024-05-24 RX ADMIN — OXYCODONE HYDROCHLORIDE 10 MG: 5 TABLET ORAL at 05:27

## 2024-05-24 RX ADMIN — SENNOSIDES AND DOCUSATE SODIUM 1 TABLET: 50; 8.6 TABLET ORAL at 08:29

## 2024-05-24 RX ADMIN — FAMOTIDINE 20 MG: 20 TABLET ORAL at 08:29

## 2024-05-24 RX ADMIN — HYDROMORPHONE HYDROCHLORIDE 0.4 MG: 1 INJECTION, SOLUTION INTRAMUSCULAR; INTRAVENOUS; SUBCUTANEOUS at 06:37

## 2024-05-24 RX ADMIN — MAGNESIUM HYDROXIDE 15 ML: 400 SUSPENSION ORAL at 08:27

## 2024-05-24 RX ADMIN — SENNOSIDES AND DOCUSATE SODIUM 1 TABLET: 50; 8.6 TABLET ORAL at 20:02

## 2024-05-24 RX ADMIN — ONDANSETRON 4 MG: 4 TABLET, ORALLY DISINTEGRATING ORAL at 05:28

## 2024-05-24 RX ADMIN — OXYCODONE HYDROCHLORIDE 10 MG: 5 TABLET ORAL at 02:24

## 2024-05-24 RX ADMIN — HYDROMORPHONE HYDROCHLORIDE 6 MG: 2 TABLET ORAL at 20:03

## 2024-05-24 RX ADMIN — FAMOTIDINE 20 MG: 20 TABLET ORAL at 20:02

## 2024-05-24 RX ADMIN — ACETAMINOPHEN 975 MG: 325 TABLET, FILM COATED ORAL at 15:15

## 2024-05-24 RX ADMIN — CEFAZOLIN SODIUM 2 G: 2 INJECTION, SOLUTION INTRAVENOUS at 03:44

## 2024-05-24 RX ADMIN — HYDROMORPHONE HYDROCHLORIDE 0.4 MG: 1 INJECTION, SOLUTION INTRAMUSCULAR; INTRAVENOUS; SUBCUTANEOUS at 09:34

## 2024-05-24 RX ADMIN — METHOCARBAMOL 750 MG: 750 TABLET ORAL at 15:15

## 2024-05-24 RX ADMIN — KETAMINE HYDROCHLORIDE 5 MG/HR: 10 INJECTION INTRAMUSCULAR; INTRAVENOUS at 02:27

## 2024-05-24 RX ADMIN — HYDROMORPHONE HYDROCHLORIDE 0.4 MG: 1 INJECTION, SOLUTION INTRAMUSCULAR; INTRAVENOUS; SUBCUTANEOUS at 17:52

## 2024-05-24 RX ADMIN — OXYCODONE HYDROCHLORIDE 10 MG: 5 TABLET ORAL at 08:28

## 2024-05-24 RX ADMIN — HYDROMORPHONE HYDROCHLORIDE 6 MG: 2 TABLET ORAL at 13:04

## 2024-05-24 RX ADMIN — GABAPENTIN 600 MG: 300 CAPSULE ORAL at 08:28

## 2024-05-24 RX ADMIN — HYDROMORPHONE HYDROCHLORIDE 0.4 MG: 1 INJECTION, SOLUTION INTRAMUSCULAR; INTRAVENOUS; SUBCUTANEOUS at 03:44

## 2024-05-24 RX ADMIN — CEFAZOLIN SODIUM 2 G: 2 INJECTION, SOLUTION INTRAVENOUS at 13:09

## 2024-05-24 RX ADMIN — METHOCARBAMOL 750 MG: 750 TABLET ORAL at 12:10

## 2024-05-24 ASSESSMENT — ACTIVITIES OF DAILY LIVING (ADL)
ADLS_ACUITY_SCORE: 27
ADLS_ACUITY_SCORE: 27
ADLS_ACUITY_SCORE: 26
ADLS_ACUITY_SCORE: 26
ADLS_ACUITY_SCORE: 27
ADLS_ACUITY_SCORE: 26
ADLS_ACUITY_SCORE: 27
ADLS_ACUITY_SCORE: 26
ADLS_ACUITY_SCORE: 25
ADLS_ACUITY_SCORE: 27
ADLS_ACUITY_SCORE: 26
ADLS_ACUITY_SCORE: 26
ADLS_ACUITY_SCORE: 27
ADLS_ACUITY_SCORE: 26
ADLS_ACUITY_SCORE: 26
ADLS_ACUITY_SCORE: 27
ADLS_ACUITY_SCORE: 27
ADLS_ACUITY_SCORE: 26

## 2024-05-24 NOTE — PLAN OF CARE
"BP (!) 149/72 (BP Location: Right arm, Patient Position: Semi-Polanco's, Cuff Size: Adult Regular)   Pulse 116   Temp 98.3  F (36.8  C) (Oral)   Resp 16   Ht 1.854 m (6' 1\")   Wt 67.1 kg (147 lb 14.9 oz)   SpO2 100%   BMI 19.52 kg/m       No acute events overnight    Pt reports uncontrolled pain with the current pain management regimen.  Yet lethargic/sedated from pain meds mostly . VSS x Tachycardic. A.O x4 . On RA  Pain managed with IV dilaudid, PO oxy , robaxin and ketamine infusion with scheduled tylenol.  C/ o nausea. Zofran administered  SBA with walker to bathroom No BM  yet.   R PIV infusing ketamine with NS. L BUE swollen from previous infiltrated IV site   Hemovac with  minimal output. To be removed today     "

## 2024-05-24 NOTE — PROGRESS NOTES
Ortho Spine Attending    I have reviewed the post-operative standing x-rays and they are pasted in this note. The desired alignment was achieved.

## 2024-05-24 NOTE — PROGRESS NOTES
"Orthopaedic Surgery Progress Note:       Subjective:   No acute overnight events.  Pain still significant but manageable.  Continues to tolerate p.o., no bowel movement yet.  Denies any radicular pain numbness tingling down the bilateral lower extremities.    Patient had several concerns today about his alignment and pain.     Objective:   BP (!) 149/72 (BP Location: Right arm, Patient Position: Semi-Polanco's, Cuff Size: Adult Regular)   Pulse 116   Temp 98.3  F (36.8  C) (Oral)   Resp 16   Ht 1.854 m (6' 1\")   Wt 67.4 kg (148 lb 9.4 oz)   SpO2 100%   BMI 19.60 kg/m    No intake/output data recorded.  Gen: NAD. Resting comfortably in bed  Resp: Breathing comfortably on RA  : Sumner in place    Drain in place 20 cc out since surgery  Saturation of the inferior aspect of the dressing, Aquacel removed incision remains approximated but weeping along the inferior aspect of the incision.       Lumbar Spine:    Appearance - No gross stepoffs or deformities    Motor -     L2-3: Hip flexion 5/5 R and 5/5 L strength          L3/4:  Knee extension R 5/5 and L 5/5 strength         L4/5:  Foot dorsiflexion R 5/5 L 5/5 and       EHL dorsiflexion R 5/5 L 5/5 strength         S1:  Plantarflexion/Peroneal Muscles  R 5/5 and L 5/5 strength    Sensation: intact to light touch L3-S1 distribution BLE        Labs:  Lab Results   Component Value Date    WBC 14.8 (H) 05/22/2024    HGB 13.2 (L) 05/22/2024     05/22/2024     Assessment and Plan: Hitesh Dobson is a 19 year old male with PMH including scheuermann's kyphosis, thoracic disc herniation now s/p PISF T3-L1 w/ SPOs T6-11 8-9 TTIF with Dr. Torres on 5/21/24.      Continue with pain control, transitioning to p.o.'s. Per Pain team, recommended rotating to PO dilaudid 4-6 q 3 PRN. Appreciate their recommendations.   Will need inferior aspect of incision oversewn today  Remove drain prior to oversewing  Monitor dressings next 24 hours  Once drain removed will obtain upright " Scoli films today vs. Tomorrow.   Tentatively work towards discharge over the weekend     Ortho (Spine) Primary  Activity:   - Up with assist until independent. No excessive bending or twisting. No lifting >10 lbs x 6 weeks.   Weight bearing status: WBAT.  Pain management:   -Continue to follow pain team recs  - Transition from IV to PO narcotics as able  Antibiotics: Ancef 2g Q8hrs until deep drain removed  Diet: Begin with clear fluids and progress diet as tolerated.   DVT prophylaxis: SCDs only. No chemical DVT ppx needed.  Imaging: XR scoli PTDC - ordered.  Labs: Hgb POD#1,2.  Bracing/Splinting: None.  Dressings: Keep Aquacel c/d/i x 7 days.  Drains: Document output per shift, will be discontinued at Orthopedic Surgery discretion.  Sumner catheter: Remove POD#1,2.  Physical Therapy/Occupational Therapy: Eval and treat.  Cultures: none.    Consults: Consult IP team when able to discharge from ICU   Follow-up: Clinic with Dr. Torres in 6 weeks with repeat x-rays.   Disposition: Pending progress with therapies, pain control on orals, and medical stability, anticipate discharge to home on POD #5-7.    Pilo Craft, DO  Spine Fellow  Addended by AAMIR Colin

## 2024-05-24 NOTE — PROGRESS NOTES
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 Torres on 5/21/24.     Patient with poor pain-control post-operatively, currently 9/10. Lidocaine gtt discontinued by primary team due to concern for possible ectopy. Discussed with the patient utilizing po pain medications and starting ketamine infusion instead of PCA dilaudid, which he agrees with.     Plan/Recommendations:  Transition patient from oral oxycodone to hydromorphone and increase dosage to 4 to 6 mg Q3H PRN  Continue Gabapentin 600 mg TID  Continue Acetaminophen 975 mg Q6H      Marcia German MD  05/24/2024     Overnight Events: No acute events overnight; continues to have difficulty with pain control    Tubes/Drains: No      Subjective: Still difficulty with pain at incision site. Oxycodone not helping and IV ketamine wasn't helpful, doesn't want to have IV ketamine.   Nausea: No  Vomiting: No  Pruritus: No      Diet: Snacks/Supplements Adult: Emanuel; Between Meals  Advance Diet as Tolerated: Regular Diet Adult    Relevant Labs:  Recent Labs   Lab Test 05/22/24  1516 05/22/24  0449   PLT  --  211   BUN 10.1  --        Physical Exam:  Vitals: /83 (BP Location: Right arm)   Pulse (!) 129   Temp 98.4  F (36.9  C) (Oral)   Resp 18   Ht 1.854 m (6' 1\")   Wt 67.4 kg (148 lb 9.4 oz)   SpO2 95%   BMI 19.60 kg/m      Physical Exam:   Orientation:  Alert, oriented, and in no acute distress: Yes  Sedation: No    Motor Examination:  5/5 Strength in lower extremities: Yes    Sensory Level:   Decrease in sensation: No      Relevant Medications:  Current Pain Medications:  Medications related to " "Pain Management (From now, onward)      Start     Dose/Rate Route Frequency Ordered Stop    05/26/24 0000  acetaminophen (TYLENOL) tablet 650 mg         650 mg Oral EVERY 4 HOURS PRN 05/23/24 1555      05/26/24 0000  bisacodyl (DULCOLAX) suppository 10 mg         10 mg Rectal DAILY PRN 05/23/24 1555      05/25/24 0000  magnesium hydroxide (MILK OF MAGNESIA) suspension 30 mL         30 mL Oral DAILY PRN 05/23/24 1555      05/24/24 1200  methocarbamol (ROBAXIN) tablet 750 mg         750 mg Oral 4 TIMES DAILY 05/24/24 0855      05/24/24 1106  HYDROmorphone (DILAUDID) tablet 4 mg        Placed in \"Or\" Linked Group    4 mg Oral EVERY 3 HOURS PRN 05/24/24 1107      05/24/24 1106  HYDROmorphone (DILAUDID) tablet 6 mg        Placed in \"Or\" Linked Group    6 mg Oral EVERY 3 HOURS PRN 05/24/24 1107      05/24/24 0900  lidocaine 1% with EPINEPHrine 1:100,000 injection 50 mL         50 mL Intradermal ONCE 05/24/24 0832      05/23/24 2000  senna-docusate (SENOKOT-S/PERICOLACE) 8.6-50 MG per tablet 1 tablet         1 tablet Oral 2 TIMES DAILY 05/23/24 1555      05/23/24 2000  polyethylene glycol (MIRALAX) Packet 17 g         17 g Oral 2 TIMES DAILY 05/23/24 1051      05/23/24 1750  hydrOXYzine HCl (ATARAX) tablet 25 mg        Placed in \"Or\" Linked Group    25 mg Oral EVERY 6 HOURS PRN 05/23/24 1750      05/23/24 1750  hydrOXYzine HCl (ATARAX) tablet 50 mg        Placed in \"Or\" Linked Group    50 mg Oral EVERY 6 HOURS PRN 05/23/24 1750      05/23/24 1555  acetaminophen (TYLENOL) tablet 975 mg         975 mg Oral EVERY 8 HOURS 05/23/24 1555 05/26/24 1559    05/23/24 1555  HYDROmorphone (PF) (DILAUDID) injection 0.2 mg        Placed in \"Or\" Linked Group    0.2 mg Intravenous EVERY 2 HOURS PRN 05/23/24 1555      05/23/24 1555  HYDROmorphone (PF) (DILAUDID) injection 0.4 mg        Placed in \"Or\" Linked Group    0.4 mg Intravenous EVERY 2 HOURS PRN 05/23/24 1555      05/23/24 1000  gabapentin (NEURONTIN) capsule 600 mg         600 mg " "Oral 3 TIMES DAILY 05/23/24 0424      05/21/24 1353  lidocaine 1 % 0.1-1 mL         0.1-1 mL Other EVERY 1 HOUR PRN 05/21/24 1400      05/21/24 1353  lidocaine (LMX4) cream          Topical EVERY 1 HOUR PRN 05/21/24 1400              Primary Service Contacted with Recommendations? Yes      Please see A&P for additional details of medical decision making.      Acute Inpatient Pain Service [facility]  Hours of pain coverage 24/7   Page via [employer]- Please Page the Pain Team Via [facility]: \"PAIN MANAGEMENT ACUTE INPATIENT/ [facility]\"             "

## 2024-05-24 NOTE — PROGRESS NOTES
Prior Authorization **INITIATED**    Medication: HYDROMORPHONE HCL 4 MG PO TABS  Insurance Company: [employer] - Phone [phone] Fax [phone]  Pharmacy Filling the Rx: [facility] - [place], [place] - [address]  Filling Pharmacy Phone: [phone]  Filling Pharmacy Fax: [phone]  Start Date: 5/24/2024  Reference #: [id number] you can feel free to PA pend if needed. Not sure if it will get approved or denied, but at least they have commercial insurance so I can always SingleCare if worse comes to worse  Comments:  Plan limits to 6 tablets daily. Discharge pharmacy sent patient with supply while auth is pending. Will retroactively bill once determination received.      Tamy Olivera [facility]  Discharge Pharmacy Liaison  [facility] [place]  Pronouns: She/Her/Hers    Securely message with Button, Epic Secure Chat, or [employer]  Phone: [phone]  Fax: [phone]  [email or web address]

## 2024-05-24 NOTE — PROGRESS NOTES
[facility]    Medicine Progress Note - Hospitalist Service, GOLD TEAM 16    Date of Admission:  5/21/2024    Assessment & Plan   Hitesh Dobson is a 19 year old male admitted on 5/21/2024.  He has a known history of Scheuermann's kyphosis, thoracic discrimination for which he underwent PSF T3-L1 with SPO's, T6-11, T 8-9 discectomy, transforaminal interbody fusion with Dr. Torres on 5/21/2024 with postop course complicated by shock for which patient required phenylephrine and admission in pediatric ICU.  Patient has been weaned off phenylephrine, and is now transferred to internal medicine for ongoing medical comanagement.  His course has also been complicated by expected postop pain for which the pain team is following along.    # S/p PSF T3-L1 with SPO's, T6-11, T 8-9 discectomy, transforaminal interbody fusion with Dr. Torres  - ortho spine is primary: Weightbearing as tolerated, SCIDS only for DVT prophylaxis, PT, OT consult.  -Patient to remain on IV Ancef while drain in place.  Postop Hgb stable.  - pain control-wise, pain team is following recommending:  -Now off ketamine gtt  -Dilaudid PCA discontinued  -Oxycodone 5-10 mg p.o. every 3 hours as needed  -Gabapentin 600 mg 3 times daily  -Tylenol 975 mg every 6 hours  -Agree with discontinuing Valium, try Robaxin instead as patient appears somnolent on evaluation.  -Aggressive bowel regimen with MiraLAX and Radha-Colace.  Encourage use of Dulcolax as well.    # Tachycardia-likely related to pain.  Of note is not hypoxic and denies any pleuritic chest pain making PE less likely.  EKG shows sinus tachycardia.  Will continue to monitor.            Diet: Snacks/Supplements Adult: [place]; Between Meals  Advance Diet as Tolerated: Regular Diet Adult    DVT Prophylaxis: Defer to primary service  Sumner Catheter: Not present  Lines: None     Cardiac Monitoring: None  Code Status: Full Code      Clinically Significant Risk Factors                                     Disposition Plan     Medically Ready for Discharge: Anticipated in 2-4 Days             MATTHIAS TALLEY MD  Hospitalist Service, GOLD TEAM 16  [facility]  Securely message with [employer] (more info)  Text page via [employer] Paging/Directory   See signed in provider for up to date coverage information  ______________________________________________________________________    Interval History   -Patient is still afebrileAnd hemodynamically stable  -Noted to be tachycardic, EKG obtained showing rates in the low 100s.  -Continues to report significant pain, however, he has been able to stand with assistance.  -Passing flatus, no BMs yet.    Physical Exam   Vital Signs: Temp: 98.4  F (36.9  C) Temp src: Oral BP: 117/83 Pulse: (!) 129   Resp: 18 SpO2: 95 % O2 Device: None (Room air)    Weight: 148 lbs 9.44 oz    General Appearance: Lying in bed, on his right side, in mild distress  Respiratory: Normal work of breathing, no accessory muscle use, clear to auscultation  Cardiovascular: Regular rate and rhythm, tachycardic.  GI: Abdomen soft nontender nondistended  Other: Drain in place, dressing along mid back    Medical Decision Making             Data

## 2024-05-24 NOTE — PROGRESS NOTES
Orthopedic Spine Procedure Note  5/24/24 @ 12:30pm    Subjective:  I met Hitesh and his mom this morning and told them the plan of drain removal, obtaining upright scoliosis XR and oversewing of distal aspect of incision. I removed drain at that time without difficulty. Hitesh reporting pain uncontrolled with oral medications. Pain localized along incision and in right paraspinals. He thinks the muscle relaxer is helping.     Objective:  Patient lay in prone position while entirety of aquacel dressing was removed along with steri strips. Distal aspect of incision and aquacel saturated with serosanguinous drainage. Mild drainage occurred while patient laying on his side. Non-purulent and non-odorous. There was some dried blood at proximal half of incision, no active drainage present . No evidence of wound dehiscence throughout incision site. Appears to be healing well without evidence of infection. See pictures below.    Distal incision:    Proximal incision:      Procedure:  Verbal and written consent was obtained. With patient laying prone, I prepped lower half of the incision and surrounding skin with alcohol. Then, using a 27-gauge needle I injected 14 cc of 1% lidocaine with epinephrine into the subcutaneous tissue along right and left sides of incision to for local analgesia. Next, skin was prepped with ChloraPrep solution and allowed to dry. Area was sterilely draped off. Sterile procedure was used to run a 3-0 nylon baseball stitch along about 10cm of the distal incision. Patient tolerated procedure well. Sterile aquacel dressing was placed over incision.     Reviewed today's standing scoliosis films with patient which shows improved coronal and sagittal alignment. Took pictures of XR images using Hitesh's mom's phone. Answered questions. Dr. Torres will be stopping by later this afternoon as well (please see his separate note). We will monitor incision for drainage over the next 24 hours or so. If drainage has  stopped, will plan on removing nylon sutures in 2 weeks (can be done locally).     Breanna Simons PA-C  Orthopedic Spine surgery

## 2024-05-24 NOTE — PLAN OF CARE
End of shift Summary: See flowsheet for VS and detail assessments.     Changes this Shift:     Neuro/CMS: A&Ox4. Calm and pleasant. Able to make needs known. Denies dizziness, headaches, nausea/vomiting and numbness/tingling sensation.     Cardiac: Tachycardic due to pain     Pulmonary: On RA. Pt LS clear and equal bilaterally. Denies chest pain and SOB.      Output: Continent of bowel/bladder. Voids spontaneously and adequately in the bathroom. LBM: 5/20. Milk of magnesia given this shift with no result. Pt is passing gas     Activity: Up SBA with walker. Steady gait. Ambulated with therapy this shift.      Skin: Spinal incision. Blanchable redness on L BUE from previous infiltrated IV site.      Pain: Rates pain 9-10/10; managed with PRN PO oxycodone, PRN PO dilaudid, PRN IV dilaudid and scheduled tylenol and robaxin.      Dressings/Drains: Hemovac removed this shift by Troy Taylor NP. Oversewing and Aquacel dressing change done this shift by Breanna LOPEZ. Aquacel dressing on the spinal incision C/D/I.     IV: R PIV SL and patent.     Additional info: X-ray done this shift. Mother at bedside.     Plan: Continue with POC.

## 2024-05-25 ENCOUNTER — APPOINTMENT (OUTPATIENT)
Dept: PHYSICAL THERAPY | Facility: CLINIC | Age: 19
End: 2024-05-25
Attending: ORTHOPAEDIC SURGERY
Payer: COMMERCIAL

## 2024-05-25 ENCOUNTER — APPOINTMENT (OUTPATIENT)
Dept: OCCUPATIONAL THERAPY | Facility: CLINIC | Age: 19
End: 2024-05-25
Attending: ORTHOPAEDIC SURGERY
Payer: COMMERCIAL

## 2024-05-25 LAB — GLUCOSE BLDC GLUCOMTR-MCNC: 126 MG/DL (ref 70–99)

## 2024-05-25 PROCEDURE — 97530 THERAPEUTIC ACTIVITIES: CPT | Mod: GP

## 2024-05-25 PROCEDURE — 250N000013 HC RX MED GY IP 250 OP 250 PS 637

## 2024-05-25 PROCEDURE — 250N000013 HC RX MED GY IP 250 OP 250 PS 637: Performed by: NURSE PRACTITIONER

## 2024-05-25 PROCEDURE — 97535 SELF CARE MNGMENT TRAINING: CPT | Mod: GO | Performed by: OCCUPATIONAL THERAPIST

## 2024-05-25 PROCEDURE — 250N000013 HC RX MED GY IP 250 OP 250 PS 637: Performed by: STUDENT IN AN ORGANIZED HEALTH CARE EDUCATION/TRAINING PROGRAM

## 2024-05-25 PROCEDURE — 99232 SBSQ HOSP IP/OBS MODERATE 35: CPT | Performed by: INTERNAL MEDICINE

## 2024-05-25 PROCEDURE — 250N000013 HC RX MED GY IP 250 OP 250 PS 637: Performed by: INTERNAL MEDICINE

## 2024-05-25 PROCEDURE — 97116 GAIT TRAINING THERAPY: CPT | Mod: GP

## 2024-05-25 PROCEDURE — 120N000002 HC R&B MED SURG/OB UMMC

## 2024-05-25 PROCEDURE — 250N000011 HC RX IP 250 OP 636: Performed by: STUDENT IN AN ORGANIZED HEALTH CARE EDUCATION/TRAINING PROGRAM

## 2024-05-25 PROCEDURE — 250N000013 HC RX MED GY IP 250 OP 250 PS 637: Performed by: PHYSICIAN ASSISTANT

## 2024-05-25 RX ORDER — AMOXICILLIN 250 MG
2 CAPSULE ORAL 2 TIMES DAILY
Status: DISCONTINUED | OUTPATIENT
Start: 2024-05-25 | End: 2024-05-27 | Stop reason: HOSPADM

## 2024-05-25 RX ADMIN — CEFAZOLIN SODIUM 2 G: 2 INJECTION, SOLUTION INTRAVENOUS at 04:38

## 2024-05-25 RX ADMIN — FAMOTIDINE 20 MG: 20 TABLET ORAL at 07:53

## 2024-05-25 RX ADMIN — HYDROMORPHONE HYDROCHLORIDE 6 MG: 2 TABLET ORAL at 07:53

## 2024-05-25 RX ADMIN — METHOCARBAMOL 750 MG: 750 TABLET ORAL at 09:16

## 2024-05-25 RX ADMIN — HYDROMORPHONE HYDROCHLORIDE 6 MG: 2 TABLET ORAL at 21:21

## 2024-05-25 RX ADMIN — ACETAMINOPHEN 975 MG: 325 TABLET, FILM COATED ORAL at 15:02

## 2024-05-25 RX ADMIN — HYDROMORPHONE HYDROCHLORIDE 0.4 MG: 1 INJECTION, SOLUTION INTRAMUSCULAR; INTRAVENOUS; SUBCUTANEOUS at 01:00

## 2024-05-25 RX ADMIN — HYDROMORPHONE HYDROCHLORIDE 6 MG: 2 TABLET ORAL at 12:08

## 2024-05-25 RX ADMIN — GABAPENTIN 600 MG: 300 CAPSULE ORAL at 14:59

## 2024-05-25 RX ADMIN — HYDROMORPHONE HYDROCHLORIDE 6 MG: 2 TABLET ORAL at 18:10

## 2024-05-25 RX ADMIN — GABAPENTIN 600 MG: 300 CAPSULE ORAL at 07:53

## 2024-05-25 RX ADMIN — METHOCARBAMOL 750 MG: 750 TABLET ORAL at 20:15

## 2024-05-25 RX ADMIN — METHOCARBAMOL 750 MG: 750 TABLET ORAL at 17:09

## 2024-05-25 RX ADMIN — POLYETHYLENE GLYCOL 3350 17 G: 17 POWDER, FOR SOLUTION ORAL at 07:53

## 2024-05-25 RX ADMIN — HYDROMORPHONE HYDROCHLORIDE 6 MG: 2 TABLET ORAL at 14:59

## 2024-05-25 RX ADMIN — GABAPENTIN 600 MG: 300 CAPSULE ORAL at 20:15

## 2024-05-25 RX ADMIN — FAMOTIDINE 20 MG: 20 TABLET ORAL at 20:15

## 2024-05-25 RX ADMIN — HYDROMORPHONE HYDROCHLORIDE 6 MG: 2 TABLET ORAL at 03:22

## 2024-05-25 RX ADMIN — SENNOSIDES AND DOCUSATE SODIUM 1 TABLET: 50; 8.6 TABLET ORAL at 07:54

## 2024-05-25 RX ADMIN — SODIUM PHOSPHATE 1 ENEMA: 7; 19 ENEMA RECTAL at 12:49

## 2024-05-25 RX ADMIN — ACETAMINOPHEN 975 MG: 325 TABLET, FILM COATED ORAL at 07:53

## 2024-05-25 RX ADMIN — METHOCARBAMOL 750 MG: 750 TABLET ORAL at 12:49

## 2024-05-25 ASSESSMENT — ACTIVITIES OF DAILY LIVING (ADL)
ADLS_ACUITY_SCORE: 26

## 2024-05-25 NOTE — PROGRESS NOTES
"Orthopaedic Surgery Progress Note:       Subjective:   NAEON. Pain issues overnight, requiring IV dilaudid. Continues to tolerate p.o., no bowel movement yet.  Denies any radicular pain numbness tingling down the bilateral lower extremities.    Patient had several concerns today about his alignment and pain.     Objective:   /70 (BP Location: Right arm)   Pulse 100   Temp 98.5  F (36.9  C) (Oral)   Resp 12   Ht 1.854 m (6' 1\")   Wt 67.4 kg (148 lb 9.4 oz)   SpO2 100%   BMI 19.60 kg/m    No intake/output data recorded.  Gen: NAD. Resting comfortably in bed  Resp: Breathing comfortably on RA    Small area of drainage on distal aspect of dressing. Drain removed.        Lumbar Spine:    Appearance - No gross stepoffs or deformities    Motor -     L2-3: Hip flexion 5/5 R and 5/5 L strength          L3/4:  Knee extension R 5/5 and L 5/5 strength         L4/5:  Foot dorsiflexion R 5/5 L 5/5 and       EHL dorsiflexion R 5/5 L 5/5 strength         S1:  Plantarflexion/Peroneal Muscles  R 5/5 and L 5/5 strength    Sensation: intact to light touch L3-S1 distribution BLE        Labs:  Lab Results   Component Value Date    WBC 14.8 (H) 05/22/2024    HGB 13.2 (L) 05/24/2024     05/22/2024     Assessment and Plan: Hitesh Dobson is a 19 year old male with PMH including scheuermann's kyphosis, thoracic disc herniation now s/p PISF T3-L1 w/ SPOs T6-11 8-9 TTIF with Dr. Torres on 5/21/24.      Continue with pain control, transitioning to p.o.'s. Per Pain team, recommended rotating to PO dilaudid 4-6 q 3 PRN. Appreciate their recommendations.   Monitor dressings  Will work towards discharge tomorrow once pain well controlled.      Ortho (Spine) Primary  Activity:   - Up with assist until independent. No excessive bending or twisting. No lifting >10 lbs x 6 weeks.   Weight bearing status: WBAT.  Pain management:   -Continue to follow pain team recs  - Transition from IV to PO narcotics as able  Antibiotics: Ancef " discontinued as drain is out   Diet: Begin with clear fluids and progress diet as tolerated.   DVT prophylaxis: SCDs only. No chemical DVT ppx needed.  Imaging: XR scoli PTDC - complete   Labs: Hgb POD#1,2.  Bracing/Splinting: None.  Dressings: Keep Aquacel c/d/i x 7 days.  Drains: Document output per shift, will be discontinued at Orthopedic Surgery discretion.  Sumner catheter: Remove POD#1,2.  Physical Therapy/Occupational Therapy: Eval and treat.  Cultures: none.    Consults: Consult IP team when able to discharge from ICU   Follow-up: Clinic with Dr. Torres in 6 weeks with repeat x-rays.   Disposition: Pending progress with therapies, pain control on orals, and medical stability, anticipate discharge to home on POD #5-7.    Hernan Turcios MD  Orthopedic Surgery PGY4

## 2024-05-25 NOTE — PROGRESS NOTES
[facility]    Medicine Progress Note - Hospitalist Service, GOLD TEAM 16    Date of Admission:  5/21/2024    Assessment & Plan   Hitesh Dobson is a 19 year old male admitted on 5/21/2024.  He has a known history of Scheuermann's kyphosis, thoracic discrimination for which he underwent PSF T3-L1 with SPO's, T6-11, T 8-9 discectomy, transforaminal interbody fusion with Dr. Torres on 5/21/2024 with postop course complicated by shock for which patient required phenylephrine and admission in pediatric ICU.  Patient has been weaned off phenylephrine, and is now transferred to internal medicine for ongoing medical comanagement.  His course has also been complicated by expected postop pain for which the pain team is following along.    # S/p PSF T3-L1 with SPO's, T6-11, T 8-9 discectomy, transforaminal interbody fusion with Dr. Torres  - ortho spine is primary: Weightbearing as tolerated, SCIDS only for DVT prophylaxis, PT, OT consult.  - Patient now s/p IV Ancef while drain in place.  Postop Hgb stable.  - pain control-wise, pain team is following recommending:  - Now off ketamine gtt  - Off Dilaudid PCA   - Hydrocodone 4-6 mg p.o. every 3 hours as needed  - Gabapentin 600 mg 3 times daily  - Tylenol 975 mg every 8 hours  - PRN Robaxin   - Aggressive bowel regimen with MiraLAX and Radha-Colace.  Encourage use of Dulcolax as well.    # Tachycardia - likely related to pain.  Improving.  Of note is not hypoxic and denies any pleuritic chest pain making PE less likely.  EKG shows sinus tachycardia.  Will continue to monitor.            Diet: Snacks/Supplements Adult: Emanuel; Between Meals  Advance Diet as Tolerated: Regular Diet Adult    DVT Prophylaxis: Defer to primary service  Sumner Catheter: Not present  Lines: None     Cardiac Monitoring: None  Code Status: Full Code      Clinically Significant Risk Factors                                    Disposition Plan     Medically Ready  for Discharge: Anticipated Tomorrow      MATTHIAS TALLEY MD  Hospitalist Service, GOLD TEAM 16  [facility]  Securely message with [employer] (more info)  Text page via [employer] Paging/Directory   See signed in provider for up to date coverage information  ______________________________________________________________________    Interval History   -Patient reports increased pain overnight   -he has remained afebrile and HDS  -Passing flatus, still with no BMs yet.    Physical Exam   Vital Signs: Temp: 97.7  F (36.5  C) Temp src: Oral BP: 135/77 Pulse: 104   Resp: 12 SpO2: 100 % O2 Device: None (Room air)    Weight: 148 lbs 9.44 oz    General Appearance: Lying in bed, on his right side, in mild distress  Respiratory: Normal work of breathing, no accessory muscle use, clear to auscultation  Cardiovascular: Regular rate and rhythm, tachycardic.  GI: Abdomen soft nontender nondistended  Other: Drain in place, dressing along mid back    Medical Decision Making             Data

## 2024-05-25 NOTE — PLAN OF CARE
End of shift Summary: See flowsheet for VS and detail assessments.     Changes this Shift:     Neuro/CMS: A&Ox4. Calm and cooperative. Able to make needs known. Denies dizziness, headaches, nausea/vomiting and numbness/tingling     Pulmonary: On RA. Denies chest pain and SOB.      Output: Continent of bowel/bladder. Voids spontaneously and adequately in the bathroom. Pt c/o constipation; fleet enema given, had 3 small and medium formed/loose BM per pt report.      Activity: Up SBA with walker. Steady gait. Ambulated with therapy this shift.      Skin: Spinal Incision     Pain: Rates pain 9-10/10; managed with PRN PO dilaudid and scheduled tylenol and robaxin     Dressings/Drains: Aquacel dressing on upper back changed and reinforced with tegaderm this shift and is C/D/I. Scant (golfball size) drainage on lower back aquacel dressing but otherwise dry and intact.      IV: R PIV SL and patent    Additional info: Mother at bedside     Plan: Continue with POC.

## 2024-05-25 NOTE — PLAN OF CARE
Occupational Therapy Discharge Summary    Reason for therapy discharge:    All goals and outcomes met, no further needs identified.    Progress towards therapy goal(s). See goals on Care Plan in [facility] electronic health record for goal details.  Goals met    Therapy recommendation(s):    Continue home exercise program.

## 2024-05-25 NOTE — PLAN OF CARE
Pt is A/O x4, SBA with the walker. R thin whole, continent of B/B. Pt continue to complained of pain, rating pain at 9-10. Pain manage with dilaudid. Spinal dressing CDI. Continue with POC.

## 2024-05-26 PROCEDURE — 250N000013 HC RX MED GY IP 250 OP 250 PS 637: Performed by: PHYSICIAN ASSISTANT

## 2024-05-26 PROCEDURE — 99232 SBSQ HOSP IP/OBS MODERATE 35: CPT | Performed by: INTERNAL MEDICINE

## 2024-05-26 PROCEDURE — 120N000002 HC R&B MED SURG/OB UMMC

## 2024-05-26 PROCEDURE — 99207 PR CDG-CUT & PASTE-POTENTIAL IMPACT ON LEVEL: CPT | Performed by: INTERNAL MEDICINE

## 2024-05-26 PROCEDURE — 250N000011 HC RX IP 250 OP 636: Performed by: STUDENT IN AN ORGANIZED HEALTH CARE EDUCATION/TRAINING PROGRAM

## 2024-05-26 PROCEDURE — 250N000013 HC RX MED GY IP 250 OP 250 PS 637: Performed by: INTERNAL MEDICINE

## 2024-05-26 PROCEDURE — 250N000013 HC RX MED GY IP 250 OP 250 PS 637: Performed by: NURSE PRACTITIONER

## 2024-05-26 PROCEDURE — 250N000013 HC RX MED GY IP 250 OP 250 PS 637

## 2024-05-26 PROCEDURE — 250N000013 HC RX MED GY IP 250 OP 250 PS 637: Performed by: STUDENT IN AN ORGANIZED HEALTH CARE EDUCATION/TRAINING PROGRAM

## 2024-05-26 RX ORDER — METHOCARBAMOL 750 MG/1
750 TABLET, FILM COATED ORAL 4 TIMES DAILY
Qty: 60 TABLET | Refills: 0 | Status: SHIPPED | OUTPATIENT
Start: 2024-05-26 | End: 2024-05-27

## 2024-05-26 RX ORDER — POLYETHYLENE GLYCOL 3350 17 G/17G
17 POWDER, FOR SOLUTION ORAL DAILY
Qty: 510 G | Refills: 3 | Status: SHIPPED | OUTPATIENT
Start: 2024-05-26

## 2024-05-26 RX ORDER — METHOCARBAMOL 500 MG/1
1000 TABLET, FILM COATED ORAL 4 TIMES DAILY
Status: DISCONTINUED | OUTPATIENT
Start: 2024-05-26 | End: 2024-05-27 | Stop reason: HOSPADM

## 2024-05-26 RX ORDER — HYDROXYZINE HYDROCHLORIDE 25 MG/1
25 TABLET, FILM COATED ORAL EVERY 6 HOURS PRN
Qty: 30 TABLET | Refills: 3 | Status: SHIPPED | OUTPATIENT
Start: 2024-05-26

## 2024-05-26 RX ORDER — ACETAMINOPHEN 325 MG/1
650 TABLET ORAL EVERY 4 HOURS PRN
Qty: 90 TABLET | Refills: 0 | Status: SHIPPED | OUTPATIENT
Start: 2024-05-26 | End: 2024-05-27

## 2024-05-26 RX ORDER — AMOXICILLIN 250 MG
2 CAPSULE ORAL 2 TIMES DAILY
Qty: 60 TABLET | Refills: 0 | Status: SHIPPED | OUTPATIENT
Start: 2024-05-26

## 2024-05-26 RX ORDER — HYDROMORPHONE HCL IN WATER/PF 6 MG/30 ML
0.2 PATIENT CONTROLLED ANALGESIA SYRINGE INTRAVENOUS
Status: DISCONTINUED | OUTPATIENT
Start: 2024-05-26 | End: 2024-05-27

## 2024-05-26 RX ADMIN — METHOCARBAMOL 1000 MG: 500 TABLET ORAL at 19:57

## 2024-05-26 RX ADMIN — POLYETHYLENE GLYCOL 3350 17 G: 17 POWDER, FOR SOLUTION ORAL at 19:57

## 2024-05-26 RX ADMIN — HYDROMORPHONE HYDROCHLORIDE 6 MG: 2 TABLET ORAL at 22:09

## 2024-05-26 RX ADMIN — HYDROMORPHONE HYDROCHLORIDE 6 MG: 2 TABLET ORAL at 00:16

## 2024-05-26 RX ADMIN — ACETAMINOPHEN 975 MG: 325 TABLET, FILM COATED ORAL at 00:16

## 2024-05-26 RX ADMIN — DOCUSATE SODIUM AND SENNOSIDES 2 TABLET: 8.6; 5 TABLET, FILM COATED ORAL at 19:57

## 2024-05-26 RX ADMIN — DOCUSATE SODIUM AND SENNOSIDES 2 TABLET: 8.6; 5 TABLET, FILM COATED ORAL at 08:39

## 2024-05-26 RX ADMIN — METHOCARBAMOL 750 MG: 750 TABLET ORAL at 08:39

## 2024-05-26 RX ADMIN — FAMOTIDINE 20 MG: 20 TABLET ORAL at 19:57

## 2024-05-26 RX ADMIN — FAMOTIDINE 20 MG: 20 TABLET ORAL at 08:07

## 2024-05-26 RX ADMIN — GABAPENTIN 600 MG: 300 CAPSULE ORAL at 14:54

## 2024-05-26 RX ADMIN — POLYETHYLENE GLYCOL 3350 17 G: 17 POWDER, FOR SOLUTION ORAL at 13:41

## 2024-05-26 RX ADMIN — GABAPENTIN 600 MG: 300 CAPSULE ORAL at 19:57

## 2024-05-26 RX ADMIN — METHOCARBAMOL 750 MG: 750 TABLET ORAL at 12:28

## 2024-05-26 RX ADMIN — METHOCARBAMOL 750 MG: 750 TABLET ORAL at 16:10

## 2024-05-26 RX ADMIN — HYDROXYZINE HYDROCHLORIDE 50 MG: 25 TABLET, FILM COATED ORAL at 10:31

## 2024-05-26 RX ADMIN — HYDROXYZINE HYDROCHLORIDE 50 MG: 25 TABLET, FILM COATED ORAL at 01:19

## 2024-05-26 RX ADMIN — HYDROMORPHONE HYDROCHLORIDE 6 MG: 2 TABLET ORAL at 15:36

## 2024-05-26 RX ADMIN — GABAPENTIN 600 MG: 300 CAPSULE ORAL at 08:07

## 2024-05-26 RX ADMIN — HYDROMORPHONE HYDROCHLORIDE 6 MG: 2 TABLET ORAL at 18:43

## 2024-05-26 RX ADMIN — HYDROMORPHONE HYDROCHLORIDE 6 MG: 2 TABLET ORAL at 12:28

## 2024-05-26 RX ADMIN — HYDROMORPHONE HYDROCHLORIDE 6 MG: 2 TABLET ORAL at 08:05

## 2024-05-26 RX ADMIN — HYDROXYZINE HYDROCHLORIDE 50 MG: 25 TABLET, FILM COATED ORAL at 22:09

## 2024-05-26 RX ADMIN — HYDROMORPHONE HYDROCHLORIDE 6 MG: 2 TABLET ORAL at 04:59

## 2024-05-26 RX ADMIN — HYDROMORPHONE HYDROCHLORIDE 0.4 MG: 1 INJECTION, SOLUTION INTRAMUSCULAR; INTRAVENOUS; SUBCUTANEOUS at 01:19

## 2024-05-26 RX ADMIN — ACETAMINOPHEN 975 MG: 325 TABLET, FILM COATED ORAL at 08:07

## 2024-05-26 ASSESSMENT — ACTIVITIES OF DAILY LIVING (ADL)
ADLS_ACUITY_SCORE: 26

## 2024-05-26 NOTE — PROGRESS NOTES
[facility]    Medicine Progress Note - Hospitalist Service, GOLD TEAM 16    Date of Admission:  5/21/2024    Assessment & Plan   Hitesh Dobson is a 19 year old male admitted on 5/21/2024.  He has a known history of Scheuermann's kyphosis, thoracic discrimination for which he underwent PSF T3-L1 with SPO's, T6-11, T 8-9 discectomy, transforaminal interbody fusion with Dr. Torres on 5/21/2024 with postop course complicated by shock for which patient required phenylephrine and admission in pediatric ICU.  Patient has been weaned off phenylephrine, and is now transferred to internal medicine for ongoing medical comanagement.  His course has also been complicated by expected postop pain for which the pain team is following along.    # S/p PSF T3-L1 with SPO's, T6-11, T 8-9 discectomy, transforaminal interbody fusion with Dr. Torres  - ortho spine is primary: Weightbearing as tolerated, SCIDS only for DVT prophylaxis, PT, OT consult.  - Patient now s/p IV Ancef while drain in place.  Postop Hgb stable.  - pain control-wise, pain team is following recommending:  - Now off ketamine gtt  - Off Dilaudid PCA   - Hydrocodone 4-6 mg p.o. every 3 hours as needed  - Gabapentin 600 mg 3 times daily  - Tylenol 975 mg every 8 hours  - PRN Robaxin   - Bowel regimen with MiraLAX and Radha-Colace.     # Tachycardia -likely due to pain.  This has resolved.            Diet: Snacks/Supplements Adult: Emanuel; Between Meals  Advance Diet as Tolerated: Regular Diet Adult  Diet    DVT Prophylaxis: Defer to primary service  Sumner Catheter: Not present  Lines: None     Cardiac Monitoring: None  Code Status: Full Code      Clinically Significant Risk Factors                                    Disposition Plan     Medically Ready for Discharge: Anticipated Today      MATTHIAS TALLEY MD  Hospitalist Service, GOLD TEAM 16  [facility]  Securely message with [employer]  (more info)  Text page via [facility] Paging/Directory   See signed in provider for up to date coverage information  ______________________________________________________________________    Interval History   -Appears pain is still a major issue for patient.  -Positive BM.  -Remains afebrile and hemodynamically stable.  -Patient is okay to be discharged home today.     Physical Exam   Vital Signs: Temp: 98.6  F (37  C) Temp src: Oral BP: 121/64 Pulse: 91   Resp: 15 SpO2: 96 % O2 Device: None (Room air)    Weight: 145 lbs 0 oz    General Appearance: Lying in bed, on his right side, in mild distress  Respiratory: Normal work of breathing, no accessory muscle use, clear to auscultation  Cardiovascular: Regular rate and rhythm, tachycardic.  GI: Abdomen soft nontender nondistended  Other: Drain in place, dressing along mid back    Medical Decision Making             Data

## 2024-05-26 NOTE — PLAN OF CARE
Physical Therapy Discharge Summary    Reason for therapy discharge:    All goals and outcomes met, no further needs identified.    Progress towards therapy goal(s). See goals on Care Plan in Epic electronic health record for goal details.  Goals met    Therapy recommendation(s):    No further mobility concerns from patient or mother. Able to ambulate and navigate stairs with mod I. Currently limited by pain. Discussed ambulation with nursing staff 3-5x/day as tolerated while still in hospital, patient and mother agreeable. Discussed re-consult PT if any mobility concerns arise.

## 2024-05-26 NOTE — PROVIDER NOTIFICATION
Paged Ortho to increase 750mg of Robaxin 4 times daily to 1000mg of Robaxin 4 times daily recommended by Marcia German MD from pain management team via phone conversation with the writer. Robaxin 1000mg ordered by Grant Perdomo MD.    Patient transferred to .

## 2024-05-26 NOTE — PLAN OF CARE
[id number] - [id number]    Goal Outcome Evaluation:      Plan of Care Reviewed With: patient    Overall Patient Progress: improvingOverall Patient Progress: improving    A&O x4 ,calm and cooperative,able to make needs known. Denied SOB,CP,n/v and numbness and tingling. VSS on Room air.  C/o inscision pain rating 9-10/10  managed with PRN dilaudid ,Atarax and scheduled tylenol and robaxin with some relief,Cold packs  utilized too.  Was able to sleep after atarax was given.  Continent of B&B. Voiding spontaneously in the bathroom /urinal. LBM 5/25 x3 loose stools, refused bedtime miralax and senna  due to loose stool from enema given in prior shift.  Spinal incision Aquacel dressing is CDI other than scant (golf ball size) drainage on lower back of the Aquacel dressing . R PIV SL and patent. Call light within reach. Nursing staff to continue with POC.    Johanny Sy RN

## 2024-05-26 NOTE — PLAN OF CARE
End of shift Summary: See flowsheet for VS and detail assessments.     Changes this Shift:     Neuro/CMS: A&Ox4. Calm and cooperative. Able to make needs known. CMS intact. Denies dizziness, headaches, N/V and N/T.      Pulmonary: On RA. Denies chest pain and SOB.      Output: Continent of bowel/bladder. Voids spontaneously in the bathroom. LBM: 5/25.      Activity: Up SBA with walker. Steady gait. Recommended to ambulate three times on the hallway with nursing.      Skin: Spinal incision.      Pain: Rates pain 9-10/10; managed with PRN PO dilaudid, PRN PO atarax and scheduled tylenol and robaxin.      Dressings/Drains: Aquacel dressing reinforced with tegaderm. Small area of drainage on lower back aquacel dressing but otherwise dry and intact.      IV: R PIV SL and patent.     Additional info: Discharge home postponed due to pain not being well controlled. Notified hospitalist and Ortho and are aware. Mother at bedside.     Plan: Continue with POC.

## 2024-05-26 NOTE — PROVIDER NOTIFICATION
Paged Ortho to reorder IV dilaudid that was discontinued due to pain that is not well managed. IV dilaudid 0.2mg reordered by Grant Perdomo MD.

## 2024-05-26 NOTE — PROGRESS NOTES
"Orthopaedic Surgery Progress Note:       Subjective:   NAEON. Required IV dilaudid overnight. Continues to tolerate p.o., + BM and voiding spontaneously.  Denies any radicular pain numbness tingling down the bilateral lower extremities.    Objective:   /64 (BP Location: Right arm)   Pulse 91   Temp 98.6  F (37  C) (Oral)   Resp 15   Ht 1.854 m (6' 1\")   Wt 67.4 kg (148 lb 9.4 oz)   SpO2 96%   BMI 19.60 kg/m    I/O this shift:  In: -   Out: 200 [Urine:200]  Gen: NAD. Resting comfortably in bed  Resp: Breathing comfortably on RA    Small area of drainage on distal aspect of dressing. Stable compared to prior.        Lumbar Spine:    Appearance - No gross stepoffs or deformities    Motor -     L2-3: Hip flexion 5/5 R and 5/5 L strength          L3/4:  Knee extension R 5/5 and L 5/5 strength         L4/5:  Foot dorsiflexion R 5/5 L 5/5 and       EHL dorsiflexion R 5/5 L 5/5 strength         S1:  Plantarflexion/Peroneal Muscles  R 5/5 and L 5/5 strength    Sensation: intact to light touch L3-S1 distribution BLE        Labs:  Lab Results   Component Value Date    WBC 14.8 (H) 05/22/2024    HGB 13.2 (L) 05/24/2024     05/22/2024     Assessment and Plan: Hitesh Dobson is a 19 year old male with PMH including scheuermann's kyphosis, thoracic disc herniation now s/p PISF T3-L1 w/ SPOs T6-11 8-9 TTIF with Dr. Torres on 5/21/24.      Continue with pain control, transitioning to p.o.'s. Per Pain team, recommended rotating to PO dilaudid 4-6 q 3 PRN. Appreciate their recommendations.   Will discontinue IV dilaudid  Monitor dressings  Will work towards discharge today once pain well controlled.      Ortho (Spine) Primary  Activity:   - Up with assist until independent. No excessive bending or twisting. No lifting >10 lbs x 6 weeks.   Weight bearing status: WBAT.  Pain management:   -Continue to follow pain team recs  - Transition from IV to PO narcotics as able  Antibiotics: Ancef discontinued as drain is out "   Diet: Begin with clear fluids and progress diet as tolerated.   DVT prophylaxis: SCDs only. No chemical DVT ppx needed.  Imaging: XR scoli PTDC - complete   Labs: Hgb POD#1,2.  Bracing/Splinting: None.  Dressings: Keep Aquacel c/d/i x 7 days.  Drains: Document output per shift, will be discontinued at Orthopedic Surgery discretion.  Sumner catheter: Remove POD#1,2.  Physical Therapy/Occupational Therapy: Eval and treat.  Cultures: none.    Consults: Pain team, medicine  Follow-up: Clinic with Dr. Torres in 6 weeks with repeat x-rays.   Disposition: Pending progress with therapies, pain control on orals, and medical stability, anticipate discharge to home on POD #5-7.    Hernan Turcios MD  Orthopedic Surgery PGY4

## 2024-05-27 VITALS
HEART RATE: 66 BPM | OXYGEN SATURATION: 100 % | RESPIRATION RATE: 16 BRPM | SYSTOLIC BLOOD PRESSURE: 85 MMHG | TEMPERATURE: 98.1 F | BODY MASS INDEX: 19.22 KG/M2 | DIASTOLIC BLOOD PRESSURE: 58 MMHG | HEIGHT: 73 IN | WEIGHT: 145 LBS

## 2024-05-27 PROCEDURE — 99232 SBSQ HOSP IP/OBS MODERATE 35: CPT | Performed by: INTERNAL MEDICINE

## 2024-05-27 PROCEDURE — 250N000013 HC RX MED GY IP 250 OP 250 PS 637: Performed by: NURSE PRACTITIONER

## 2024-05-27 PROCEDURE — 250N000011 HC RX IP 250 OP 636

## 2024-05-27 PROCEDURE — 250N000013 HC RX MED GY IP 250 OP 250 PS 637

## 2024-05-27 PROCEDURE — 250N000013 HC RX MED GY IP 250 OP 250 PS 637: Performed by: STUDENT IN AN ORGANIZED HEALTH CARE EDUCATION/TRAINING PROGRAM

## 2024-05-27 PROCEDURE — 250N000013 HC RX MED GY IP 250 OP 250 PS 637: Performed by: INTERNAL MEDICINE

## 2024-05-27 PROCEDURE — 99207 PR CDG-CUT & PASTE-POTENTIAL IMPACT ON LEVEL: CPT | Performed by: INTERNAL MEDICINE

## 2024-05-27 RX ORDER — GABAPENTIN 300 MG/1
900 CAPSULE ORAL 3 TIMES DAILY
Qty: 60 CAPSULE | Refills: 0 | Status: SHIPPED | OUTPATIENT
Start: 2024-05-27 | End: 2024-06-03

## 2024-05-27 RX ORDER — GABAPENTIN 300 MG/1
900 CAPSULE ORAL 3 TIMES DAILY
Status: DISCONTINUED | OUTPATIENT
Start: 2024-05-27 | End: 2024-05-27 | Stop reason: HOSPADM

## 2024-05-27 RX ORDER — METHOCARBAMOL 1000 MG/1
1000 TABLET, FILM COATED ORAL 4 TIMES DAILY
Qty: 60 TABLET | Refills: 0 | Status: SHIPPED | OUTPATIENT
Start: 2024-05-27 | End: 2024-06-14

## 2024-05-27 RX ORDER — ACETAMINOPHEN 325 MG/1
975 TABLET ORAL EVERY 8 HOURS
Status: DISCONTINUED | OUTPATIENT
Start: 2024-05-27 | End: 2024-05-27 | Stop reason: HOSPADM

## 2024-05-27 RX ORDER — ACETAMINOPHEN 325 MG/1
975 TABLET ORAL EVERY 8 HOURS
Qty: 60 TABLET | Refills: 0 | Status: SHIPPED | OUTPATIENT
Start: 2024-05-27

## 2024-05-27 RX ADMIN — HYDROMORPHONE HYDROCHLORIDE 6 MG: 2 TABLET ORAL at 12:58

## 2024-05-27 RX ADMIN — HYDROMORPHONE HYDROCHLORIDE 6 MG: 2 TABLET ORAL at 09:47

## 2024-05-27 RX ADMIN — DOCUSATE SODIUM AND SENNOSIDES 2 TABLET: 8.6; 5 TABLET, FILM COATED ORAL at 08:53

## 2024-05-27 RX ADMIN — ACETAMINOPHEN 975 MG: 325 TABLET, FILM COATED ORAL at 01:46

## 2024-05-27 RX ADMIN — METHOCARBAMOL 1000 MG: 500 TABLET ORAL at 08:52

## 2024-05-27 RX ADMIN — ACETAMINOPHEN 975 MG: 325 TABLET, FILM COATED ORAL at 09:47

## 2024-05-27 RX ADMIN — HYDROMORPHONE HYDROCHLORIDE 0.2 MG: 0.2 INJECTION, SOLUTION INTRAMUSCULAR; INTRAVENOUS; SUBCUTANEOUS at 01:46

## 2024-05-27 RX ADMIN — GABAPENTIN 900 MG: 300 CAPSULE ORAL at 08:53

## 2024-05-27 RX ADMIN — HYDROXYZINE HYDROCHLORIDE 50 MG: 25 TABLET, FILM COATED ORAL at 06:45

## 2024-05-27 RX ADMIN — HYDROMORPHONE HYDROCHLORIDE 6 MG: 2 TABLET ORAL at 06:45

## 2024-05-27 RX ADMIN — FAMOTIDINE 20 MG: 20 TABLET ORAL at 08:52

## 2024-05-27 RX ADMIN — HYDROXYZINE HYDROCHLORIDE 25 MG: 25 TABLET, FILM COATED ORAL at 12:58

## 2024-05-27 ASSESSMENT — ACTIVITIES OF DAILY LIVING (ADL)
ADLS_ACUITY_SCORE: 26

## 2024-05-27 NOTE — PROGRESS NOTES
End of shift Summary: See flowsheet for VS and detail assessments.     Changes this Shift:      Pulmonary: Pt denies SOB & chest pain. Pt is on RA.     Diet: Regular diet, medication whole with thin liquids.    Output: Pt is voiding adequately using the bathroom, LBM 05/26.     Activity: Pt is stand by assist.     Skin: Surgical spinal infusion.     Pain: Pt rates pain 10/10, managed with PRN dilaudid.     Neuro/CMS: Pt is alert and oriented x 4. Denies numbness and tingling.     Dressings/Drains: Small amount of old drainage.     IV: R PIV saline locked.     Additional info: Pt requested oxy and ketamine drip in hopes they would help with better pain control. Writer paged the provider, IV dilaudid given (once overnight) per provider until pt is seen in the morning to discuss pain management further. Pt agreed to IV dilaudid and stated he was not aware he had IV dilaudid available.      Plan:  Plan of care ongoing.

## 2024-05-27 NOTE — PLAN OF CARE
Pt. discharged at 1:14 PM to home, and left with personal belongings. Pt. received complete discharge paperwork and  medications as filled by discharge pharmacy. Pt received and signed for the narcotic medication hydromorphone. Pt. was given times of last dose for all discharge medications in writing on discharge medication sheets. Discharge teaching included  medication, pain management, activity restrictions, dressing changes, and signs and symptoms of infection. Dressing supplies sent home. Pt. to follow up with Ortho in six weeks. Pt. had no further questions at the time of discharge and no unmet needs were identified.

## 2024-05-27 NOTE — PROGRESS NOTES
[facility]    Medicine Progress Note - Hospitalist Service, GOLD TEAM 16    Date of Admission:  5/21/2024    Assessment & Plan   Hitesh Dobson is a 19 year old male admitted on 5/21/2024.  He has a known history of Scheuermann's kyphosis, thoracic discrimination for which he underwent PSF T3-L1 with SPO's, T6-11, T 8-9 discectomy, transforaminal interbody fusion with Dr. Torres on 5/21/2024 with postop course complicated by shock for which patient required phenylephrine and admission in pediatric ICU.  Patient has been weaned off phenylephrine, and is now transferred to internal medicine for ongoing medical comanagement.  His course has also been complicated by expected postop pain for which the pain team is following along.    # S/p PSF T3-L1 with SPO's, T6-11, T 8-9 discectomy, transforaminal interbody fusion with Dr. Torres  - ortho spine is primary: Weightbearing as tolerated, SCIDS only for DVT prophylaxis, PT, OT consult.  - Patient now s/p IV Ancef while drain in place.  Postop Hgb stable.  - pain control-wise, pain team is following recommending:  - Now off ketamine gtt  - Off Dilaudid PCA   - Hydrocodone 4-6 mg p.o. every 3 hours as needed  - Gabapentin 600 mg 3 times daily  - Tylenol 975 mg every 8 hours  - PRN Robaxin   - Bowel regimen with MiraLAX and Radha-Colace.     # Tachycardia -likely due to pain.  This has resolved.            Diet: Snacks/Supplements Adult: Emanuel; Between Meals  Advance Diet as Tolerated: Regular Diet Adult  Diet    DVT Prophylaxis: Defer to primary service  Sumner Catheter: Not present  Lines: None     Cardiac Monitoring: None  Code Status: Full Code      Clinically Significant Risk Factors                                    Disposition Plan     Medically Ready for Discharge: Anticipated Today      MATTHIAS TALLEY MD  Hospitalist Service, GOLD TEAM 16  [facility]  Securely message with [employer]  (more info)  Text page via [employer] Paging/Directory   See signed in provider for up to date coverage information  ______________________________________________________________________    Interval History   -Remains afebrile and hemodynamically stable.  -Could not discharge yesterday due to pain issues; likely discharge today    Physical Exam   Vital Signs: Temp: 98.1  F (36.7  C) Temp src: Oral BP: (!) 85/58 (was moving around alot) Pulse: 66   Resp: 16 SpO2: 100 % O2 Device: None (Room air)    Weight: 145 lbs 0 oz    General Appearance: Lying in bed, on his right side, in mild distress  Respiratory: Normal work of breathing, no accessory muscle use, clear to auscultation  Cardiovascular: Regular rate and rhythm, tachycardic.  GI: Abdomen soft nontender nondistended  Other: Drain in place, dressing along mid back    Medical Decision Making             Data

## 2024-05-27 NOTE — PROGRESS NOTES
"Orthopaedic Surgery Progress Note:       Subjective:   NURYS, AF, VSS. Required IV dilaudid again overnight.  Patient stated that he was not aware that he could receive oral medications, and that he only took the IV because it was offered to him.  Pain control does seem to be an ongoing issue for him.  Spent extensive amount of time discussing with the patient this morning that he is unable to go home until he is off IV pain medications.  Stated that he would plan to forego the IV medications today so that he could hopefully discharge today.  We are going to try his oral pain regimen.  Otherwise has passed physical therapy and would be otherwise ready to go.    Objective:   /83 (BP Location: Left arm)   Pulse 78   Temp 97.6  F (36.4  C) (Oral)   Resp 16   Ht 1.854 m (6' 1\")   Wt 65.8 kg (145 lb)   SpO2 100%   BMI 19.13 kg/m    No intake/output data recorded.  Gen: NAD. Resting comfortably in bed  Resp: Breathing comfortably on RA    Small area of drainage on distal aspect of dressing. Stable compared to prior. 05/27       Lumbar Spine:    Appearance - No gross stepoffs or deformities    Motor -     L2-3: Hip flexion 5/5 R and 5/5 L strength          L3/4:  Knee extension R 5/5 and L 5/5 strength         L4/5:  Foot dorsiflexion R 5/5 L 5/5 and       EHL dorsiflexion R 5/5 L 5/5 strength         S1:  Plantarflexion/Peroneal Muscles  R 5/5 and L 5/5 strength    Sensation: intact to light touch L3-S1 distribution BLE        Labs:  Lab Results   Component Value Date    WBC 14.8 (H) 05/22/2024    HGB 13.2 (L) 05/24/2024     05/22/2024     Assessment and Plan: Hitesh Dobson is a 19 year old male with PMH including scheuermann's kyphosis, thoracic disc herniation now s/p PISF T3-L1 w/ SPOs T6-11 8-9 TTIF with Dr. Torres on 5/21/24.      Continue with pain control, transitioning to p.o.'s. Per Pain team, recommended rotating to PO dilaudid 4-6 q 3 PRN. Appreciate their recommendations.   Will discontinue " IV dilaudid: 05/27  Will increase gabapentin to 900 mg  Will hope to discharge to home today     Ortho (Spine) Primary  Activity:   - Up with assist until independent. No excessive bending or twisting. No lifting >10 lbs x 6 weeks.   Weight bearing status: WBAT.  Pain management:   -Continue to follow pain team recs  - Transition from IV to PO narcotics as able  Antibiotics: Ancef discontinued as drain is out   Diet: Begin with clear fluids and progress diet as tolerated.   DVT prophylaxis: SCDs only. No chemical DVT ppx needed.  Imaging: XR scoli PTDC - complete   Labs: Hgb POD#1,2.  Bracing/Splinting: None.  Dressings: Keep Aquacel c/d/i x 7 days.  Drains: Document output per shift, will be discontinued at Orthopedic Surgery discretion.  Sumner catheter: Remove POD#1,2.  Physical Therapy/Occupational Therapy: Eval and treat.  Cultures: none.    Consults: Pain team, medicine  Follow-up: Clinic with Dr. Torres in 6 weeks with repeat x-rays.   Disposition: Pending progress with therapies, pain control on orals, and medical stability, anticipate discharge to home on POD #5-7.    Juan Navas MD  Orthopaedic Surgery PGY-4

## 2024-05-28 LAB
ATRIAL RATE - MUSE: 72 BPM
DIASTOLIC BLOOD PRESSURE - MUSE: NORMAL MMHG
INTERPRETATION ECG - MUSE: NORMAL
P AXIS - MUSE: 34 DEGREES
PR INTERVAL - MUSE: 134 MS
QRS DURATION - MUSE: 92 MS
QT - MUSE: 366 MS
QTC - MUSE: 400 MS
R AXIS - MUSE: 75 DEGREES
SYSTOLIC BLOOD PRESSURE - MUSE: NORMAL MMHG
T AXIS - MUSE: 46 DEGREES
VENTRICULAR RATE- MUSE: 72 BPM

## 2024-05-29 NOTE — DISCHARGE SUMMARY
ORTHOPAEDIC DISCHARGE SUMMARY     Date of Admission: 5/21/2024  Date of Discharge: 5/27/2024  1:14 PM  Disposition: Home  Staff Physician: Dr. Torres  Primary Care Provider: No Ref-Primary, Physician    DISCHARGE DIAGNOSIS:  Scheuermann's kyphosis [M42.00]  Thoracic disc herniation [M51.24]    PROCEDURES: Procedure(s):  Posterior Spinal Fusion Thoracic 3-Lumbar 1, Segmental Instrumentation, Hernandez Gallo Osteotomies Thoracic 6-11, thoracic 8-9 discetomy, transforaminal interbody fusion, insert intervertebral device on 5/21/2024.     BRIEF HISTORY:  ''Patient is a 19-year-old male with Scheuerman's kyphosis. He meets the Barbosa criteria of wedging of 3 adjacent vertebra of 5 degrees or more. His thoracic kyphosis exceeds that which would be expected given his lumbar lordosis of 55 degrees. His thoracic kyphosis measures 80 degrees from T2-T12. Preoperative MRI showed that there was spinal cord shape change and apparent thoracic disc herniations at T8-9 and possibly T9-10 although T8-9 was the most severe focal alteration. ''    HOSPITAL COURSE:    Surgery was uncomplicated. Hitesh Dobson has done well post-operatively. Medicine was consulted post operatively to aid in management of medical comorbidities. See final recommendations below. He did have difficulty with pain control and IP Pain service was also consulted. Patient was additionally noted to have some drainage from his incision which required oversewing near the inferior aspect prior to discharge and thus needs wound check follow up in 2 weeks. The patient received routine nursing cares and is medically stable. Vital signs are stable. The patient is tolerating a regular diet without GI distress/nausea or vomiting. Voiding spontaneously. All PT/OT goals have been met for safe mobility. Pain is now controlled on oral medications which will be available on discharge. Stool softeners have been used while taking pain medications to help prevent constipation.  Hitesh HERNANDEZ Dobson is deemed medically safe to discharge.     Antibiotics:  Ancef given periop until drains removed   DVT prophylaxis:  Ambulatory with SCD's  PT Progress:  Has met PT/OT goals for safe mobility.   Pain Meds:  Weaned off all IV pain meds by discharge.  Inpatient Events: No significant events or complications.     Discharge orders and instructions as below.    FOLLOWUP:    Future Appointments   Date Time Provider Department Center   6/27/2024  9:00 AM Jayson Torres MD [facility]   8/8/2024  9:00 AM Jayson Torres MD [facility]       Appointments on [facility]. Call [phone] if you haven't heard regarding these appointments within 7 days of discharge.    PLANNED DISCHARGE ORDERS:     DVT Prophylaxis: Ambulatory      Discharge Medication List as of 5/27/2024 12:22 PM        START taking these medications    Details   HYDROmorphone (DILAUDID) 4 MG tablet Take 1-1.5 tablets (4-6 mg) by mouth every 3 hours as needed for moderate to severe pain, Disp-62 tablet, R-0, Local Print      hydrOXYzine HCl (ATARAX) 25 MG tablet Take 1 tablet (25 mg) by mouth every 6 hours as needed for other or itching (adjuvant pain), Disp-30 tablet, R-3, E-Prescribe      polyethylene glycol (MIRALAX) 17 GM/Dose powder Take 17 g by mouth daily, Disp-510 g, R-3, E-Prescribe      senna-docusate (SENOKOT-S/PERICOLACE) 8.6-50 MG tablet Take 2 tablets by mouth 2 times daily, Disp-60 tablet, R-0, E-Prescribe           CONTINUE these medications which have CHANGED    Details   acetaminophen (TYLENOL) 325 MG tablet Take 3 tablets (975 mg) by mouth every 8 hours, Disp-60 tablet, R-0, E-Prescribe      gabapentin (NEURONTIN) 300 MG capsule Take 3 capsules (900 mg) by mouth 3 times daily, Disp-60 capsule, R-0, E-Prescribe      methocarbamol 1000 MG TABS Take 1,000 mg by mouth 4 times daily, Disp-60 tablet, R-0, E-Prescribe           CONTINUE these medications which have NOT CHANGED    Details  "  multivitamin w/minerals (MULTI-VITAMIN) tablet Take 1 tablet by mouth daily When remember's, Historical               Discharge Procedure Orders   Reason for your hospital stay   Order Comments: Spine surgery     Activity   Order Comments: Your activity upon discharge: no lifting, driving, or strenuous exercise until cleared by your doctor     Order Specific Question Answer Comments   Is discharge order? Yes      When to contact your care team   Order Comments: Call your primary doctor if you have any of the following: temperature greater than 100.4, increased shortness of breath, increased drainage, increased swelling, or increased pain.     Wound care and dressings   Order Comments: Instructions to care for your wound at home: keep dressing clean, dry and intact.     Discharge Instructions   Order Comments: Post-operative Discharge Instructions:     WOUND CARE:  You have a dressing on your incision which can be worn for up to 7 days, and it can be worn in the shower. After the dressing has been removed, you do not need a dressing. You should have sutures/ stitches under the skin that dissolve on their own. If you have visible sutures, please come in for removal 2-3 weeks after your surgery. There is \"surgical glue\" directly over the incision. This will fall off on its own, which can take up to 2 weeks. It is okay to shower and wash gently with soap and water. Do not soak in the bath. No pools, hot tubs, or lakes for 6 weeks.      After surgery, you may have a sensitive scar.  When the incision has fully healed, you may massage the scar to decrease sensitivity and help break down scar tissue. Do this up to 4 times per day.       DIET & EXERCISE:  - When you get home, you may resume your normal diet as tolerated. You may not be very hungry but try to eat small healthy meals to help you heal. Remember to drink plenty of water/fluids to help keep you hydrated.     - You will be seen in the clinic at 6 weeks " following surgery. You will not need to attend physical therapy during this time. You can focus on cardiovascular fitness by walking as much as you can tolerate. Avoid bending, lifting, and twisting. Your weight lifting restriction is 10 pounds until your first follow-up appointment.       PAIN MEDICATIONS:  For postoperative pain control, we have prescribed a variety of medications. Tylenol has been prescribed and should be taken as part of the complete pain management regimen. You may also have been prescribed a muscle relaxer to be taken as needed for muscle spasms. Other pain management techniques include icing the surgical area (for 15 minutes on, 15 minutes off) throughout the day to help with inflammation. Avoid NSAIDs (ibuprofen, Aleve, Advil, etc) for the first 12 weeks after surgery, unless approved by your surgeon.     You also received a prescription for a opioid medication.  This should be taken for the first few weeks following surgery.  As pain improves, decrease the amount you take (see tapering plan below). Opioid have numerous side effects including nausea, constipation, and drowsiness. If you experience nausea, this may be relieved by taking the medication with food or a light meal. To avoid constipation, please use an over-the-counter stool softeners and drink lots of water and eat fruits and vegetables. No operating heavy machinery or driving an automobile while on opioid medications.        Tapering opioids: As your pain symptoms improve, focus your efforts on decreasing (tapering) use of opioid medications. The most successful tapering strategy is to first, decrease the number of tablets you take every 4-6 hours to the minimum prescribed. Then, increase the amount of time between doses.  For example:  First, taper to   or 1 tablet every 4-6 hours.  Then, taper to   or 1 tablet every 6-8 hours.  Then, taper to   or 1 tablet every 8-10 hours.  Then, taper to   or 1 tablet every 10-12  hours.  Then, taper to   or 1 tablet at bedtime.  The bedtime dose can help with comfort during sleep and is typically the last dose to be discontinued after surgery.     Call the orthopedic clinic at [phone] several business days in advance of when you need a refill. Early refills will not be provided, and you may not take more than what is prescribed. Inform the nurse how much of the medication you are taking and how many tablets you have left.     WHEN TO CALL:  Please call or return if you experience the following:  - Fevers (temperature greater than 100.4 degrees Fahrenheit).  - Pain that is getting worse or does not respond to pain medications.  - Drainage from your wound.  - Increasing redness about the wound.  - Changes in strength or sensation to your arms/legs.   - Any other worrisome symptoms.     You may reach the clinic by dialing [phone].  After hours, you may reach the resident on call by dialing [phone].      Follow Up ([facility])   Order Comments: Follow up with Dr. Torres as scheduled.     Appointments on [facility] and/or [facility] (with [facility] or [facility] provider or service). Call [phone] if you haven't heard regarding these appointments within 7 days of discharge.     Diet   Order Comments: Follow this diet upon discharge: Orders Placed This Encounter      Snacks/Supplements Adult: Emanuel; Between Meals      Advance Diet as Tolerated: Regular Diet Adult     Order Specific Question Answer Comments   Is discharge order? Yes          Pilo Craft, DO  Spine Fellow

## 2024-05-30 LAB
ATRIAL RATE - MUSE: 105 BPM
DIASTOLIC BLOOD PRESSURE - MUSE: NORMAL MMHG
INTERPRETATION ECG - MUSE: NORMAL
P AXIS - MUSE: 56 DEGREES
PR INTERVAL - MUSE: 136 MS
QRS DURATION - MUSE: 100 MS
QT - MUSE: 328 MS
QTC - MUSE: 433 MS
R AXIS - MUSE: 65 DEGREES
SYSTOLIC BLOOD PRESSURE - MUSE: NORMAL MMHG
T AXIS - MUSE: 72 DEGREES
VENTRICULAR RATE- MUSE: 105 BPM

## 2024-06-02 ENCOUNTER — MYC MEDICAL ADVICE (OUTPATIENT)
Dept: ORTHOPEDICS | Facility: CLINIC | Age: 19
End: 2024-06-02
Payer: COMMERCIAL

## 2024-06-05 DIAGNOSIS — Z98.1 S/P FUSION OF THORACIC SPINE: ICD-10-CM

## 2024-06-05 NOTE — TELEPHONE ENCOUNTER
See My Chart message from postop pt C/O stabbing burning pain under Right rib area & replied to Breanna LOPEZ stating not the same pain as had in hospital before discharge.  Lives in [place].    I called pt who stated it is not constant pain, happens when changing positions such as leaning to side & is better than when it started.  Stated not as worried about it now.  Stated Mom sent My Chart message & stated postop back pain is controlled with Dilaudid, Tylenol, Robaxin & Gabapentin 900mg TID as ordered & has enough of all because they were just refilled on 6-3-24.  Having BMs & taking Senna & Miralax & has enough.  Taking fluids well & food slowly.  Advised Try Ice & OTC patches or creams & pt agreed.  [facility] POP appt. 6-27-24.  Call back prn. Pt agreed.  Rizwana Salcido RN.

## 2024-06-06 RX ORDER — GABAPENTIN 300 MG/1
900 CAPSULE ORAL 3 TIMES DAILY
Qty: 540 CAPSULE | Refills: 0 | Status: SHIPPED | OUTPATIENT
Start: 2024-06-06

## 2024-06-13 ENCOUNTER — MYC MEDICAL ADVICE (OUTPATIENT)
Dept: ORTHOPEDICS | Facility: CLINIC | Age: 19
End: 2024-06-13
Payer: COMMERCIAL

## 2024-06-13 DIAGNOSIS — Z98.1 S/P FUSION OF THORACIC SPINE: ICD-10-CM

## 2024-06-13 DIAGNOSIS — Z98.1 S/P FUSION OF THORACIC SPINE: Primary | ICD-10-CM

## 2024-06-14 RX ORDER — METHOCARBAMOL 1000 MG/1
1000 TABLET, FILM COATED ORAL 4 TIMES DAILY
Qty: 60 TABLET | Refills: 0 | Status: SHIPPED | OUTPATIENT
Start: 2024-06-14

## 2024-06-27 ENCOUNTER — OFFICE VISIT (OUTPATIENT)
Dept: ORTHOPEDICS | Facility: CLINIC | Age: 19
End: 2024-06-27
Payer: COMMERCIAL

## 2024-06-27 ENCOUNTER — ANCILLARY PROCEDURE (OUTPATIENT)
Dept: GENERAL RADIOLOGY | Facility: CLINIC | Age: 19
End: 2024-06-27
Attending: ORTHOPAEDIC SURGERY
Payer: COMMERCIAL

## 2024-06-27 VITALS — WEIGHT: 141 LBS | BODY MASS INDEX: 19.1 KG/M2 | HEIGHT: 72 IN

## 2024-06-27 DIAGNOSIS — M51.24 THORACIC DISC HERNIATION: ICD-10-CM

## 2024-06-27 DIAGNOSIS — Z98.1 S/P FUSION OF THORACIC SPINE: ICD-10-CM

## 2024-06-27 DIAGNOSIS — Z98.1 S/P FUSION OF THORACIC SPINE: Primary | ICD-10-CM

## 2024-06-27 DIAGNOSIS — M42.00 SCHEUERMANN'S KYPHOSIS: ICD-10-CM

## 2024-06-27 PROCEDURE — 99024 POSTOP FOLLOW-UP VISIT: CPT | Performed by: PHYSICIAN ASSISTANT

## 2024-06-27 PROCEDURE — 72082 X-RAY EXAM ENTIRE SPI 2/3 VW: CPT | Performed by: STUDENT IN AN ORGANIZED HEALTH CARE EDUCATION/TRAINING PROGRAM

## 2024-06-27 PROCEDURE — 77073 BONE LENGTH STUDIES: CPT | Performed by: STUDENT IN AN ORGANIZED HEALTH CARE EDUCATION/TRAINING PROGRAM

## 2024-06-27 NOTE — PROGRESS NOTES
REASON FOR VISIT: RECHECK    REFERRING PHYSICIAN: Referred Self     PRIMARY CARE PHYSICIAN: No Ref-Primary, Physician    HISTORY OF PRESENT ILLNESS: Hitesh Dobson is a 19 year old male who presents for routine 6-week post op s/p 5/21/2024 PISF T3-L1, SPO T6-11, T8-9 TTIF for Shermans kyphosis and T8-9 thoracic disc herniation.  Here today with mom.  Reports he is doing well.  Off narcotic pain medications.  Occasionally takes gabapentin and Robaxin.  Takes Tylenol as needed for pain.  Wound has healed well.  Had nylon sutures removed at a local clinic.  Has some complaints of cervical thoracic junction paraspinal pain.  Numbness around surgical incision.  Overall, doing well.  Interested in returning to basketball and going back to work part-time.      Oswestry score: 20  Pain scale: 3.5    PHYSICAL EXAM:   Constitutional - Patient is healthy, well-nourished and appears stated age.   Respiratory - Patient is breathing normally and in no respiratory distress.   Skin - No suspicious rashes or lesions.   Psychiatric - Normal mood and affect.   Cardiovascular - Peripheral pulses are normal.   Eyes - Visual acuity is normal to the written word.   ENT - Hearing intact to the spoken word.   GI - No abdominal distention.   Musculoskeletal - Non-antalgic gait without use of assistive devices.  Neutral sagittal and coronal alignment.    Appearance -well-healed midline thoracic surgical incision.  Small Vicryl suture's not protruding midline which was removed without difficulty.      Palpation - Non-tender to palpation    ROM - Full     Motor -        LOWER EXTREMITY Left Right   Hip flexion 5/5 5/5   Knee flexion 5/5 5/5   Knee extension 5/5 5/5   Ankle dorsiflexion 5/5 5/5   Ankle plantarflexion 5/5 5/5   Great toe extension 5/5 5/5   Great toe flexion 5/5 5/5        Special tests -     Straight leg raise - negative       Neurologic - Sensation intact to light touch bilaterally.  +2 DTR Achilles and patella      IMAGING:    6/27/2024 EOS full body standing AP/lateral views: Neutral coronal alignment.  No evidence of leg length discrepancy or hip degenerative changes.  Stable appearance of posterior instrumentation present T3-L1 without evidence of hardware failure.  No evidence of adjacent segment degeneration.. See full radiologic report in chart.      CLINICAL ASSESSMENT:   1. 6-week s/p PISF T3-L1, SPO T6-11, T8-9 TTIF for Shermans kyphosis and T8-9 thoracic disc herniation (DOS 5/21/2024); progressing as expected    DISCUSSION/PLAN:   Reviewed patient's own XR images with him which demonstrate stable instrumentation, improved alignment compared to preop.  Recommended purchase of home over-the-door cervical traction unit to help with cervical thoracic muscle spasms.  Also demonstrated use of trigger point injections, instructed mom on how to do this at home.  Discussed medication management.  Discussed activity restrictions at this time; may advance to 20 pound lifting restriction, and gradually increase activities, but avoid impact sports.    - advised trial of home over door cervical traction unit (use 5-10lb)  - Wean off gabapentin as tolerated. Ok to contintue occasional Methocarbamol PRN. Continue with tylenol as needed.  Hold off on NSAID use until 3 months postop.  - Ok to Return to work part-time (3 days a week, 4-hour shifts, max lifting limit 20 pounds). Letter provided.  - Okay to shoot hoops with basketball, but no contact basketball/ sports until after 3 months post-op  - follow up at 3 months post-op with repeat EOS full body standing AP/lat XR      All questions and concerns were answered to the patient's apparent satisfaction before leaving the clinic.     Thank you for allowing Dr. Torres and I to participate in the care of Hitesh Dobson.    Respectfully,  Breanna Simons PA-C    I saw and evaluated the patient and developed the plan.  Jayson Torres MD      CC  Copy to patient    [address] [place]  [address]

## 2024-06-27 NOTE — LETTER
Return to Work  2024     Seen today: Yes    Patient:  Hitesh Dobson  :   2005  MRN:     [id number]  Physician: JAYSON TORRES    Hitesh Dobson may return to work on Date: 2024.      The next clinic appointment is scheduled for (date/time) 6 weeks from today.    Patient limitations:    May return to work part-time (3 days a weeks, 4-hour shifts).  maximum lifting limit 20lb. Avoid excessive bending and twisting.  Will reassess at upcoming postop visit.    Electronically signed by Jayson Torres MD

## 2024-06-27 NOTE — LETTER
6/27/2024      Hitesh Dobson  [address] [place] [address]      Dear Colleague,    Thank you for referring your patient, Hitesh Dobson, to the [facility] [place]. Please see a copy of my visit note below.    REASON FOR VISIT: RECHECK    REFERRING PHYSICIAN: Referred Self     PRIMARY CARE PHYSICIAN: No Ref-Primary, Physician    HISTORY OF PRESENT ILLNESS: Hitesh Dobson is a 19 year old male who presents for routine 6-week post op s/p 5/21/2024 PISF T3-L1, SPO T6-11, T8-9 TTIF for Shermans kyphosis and T8-9 thoracic disc herniation.  Here today with mom.  Reports he is doing well.  Off narcotic pain medications.  Occasionally takes gabapentin and Robaxin.  Takes Tylenol as needed for pain.  Wound has healed well.  Had nylon sutures removed at a local clinic.  Has some complaints of cervical thoracic junction paraspinal pain.  Numbness around surgical incision.  Overall, doing well.  Interested in returning to basketball and going back to work part-time.      Oswestry score: 20  Pain scale: 3.5    PHYSICAL EXAM:   Constitutional - Patient is healthy, well-nourished and appears stated age.   Respiratory - Patient is breathing normally and in no respiratory distress.   Skin - No suspicious rashes or lesions.   Psychiatric - Normal mood and affect.   Cardiovascular - Peripheral pulses are normal.   Eyes - Visual acuity is normal to the written word.   ENT - Hearing intact to the spoken word.   GI - No abdominal distention.   Musculoskeletal - Non-antalgic gait without use of assistive devices.  Neutral sagittal and coronal alignment.    Appearance -well-healed midline thoracic surgical incision.  Small Vicryl suture's not protruding midline which was removed without difficulty.      Palpation - Non-tender to palpation    ROM - Full     Motor -        LOWER EXTREMITY Left Right   Hip flexion 5/5 5/5   Knee flexion 5/5 5/5   Knee extension 5/5 5/5   Ankle dorsiflexion 5/5 5/5   Ankle  plantarflexion 5/5 5/5   Great toe extension 5/5 5/5   Great toe flexion 5/5 5/5        Special tests -     Straight leg raise - negative       Neurologic - Sensation intact to light touch bilaterally.  +2 DTR Achilles and patella      IMAGIN2024 EOS full body standing AP/lateral views: Neutral coronal alignment.  No evidence of leg length discrepancy or hip degenerative changes.  Stable appearance of posterior instrumentation present T3-L1 without evidence of hardware failure.  No evidence of adjacent segment degeneration.. See full radiologic report in chart.      CLINICAL ASSESSMENT:   1. 6-week s/p PISF T3-L1, SPO T6-11, T8-9 TTIF for Shermans kyphosis and T8-9 thoracic disc herniation (DOS 2024); progressing as expected    DISCUSSION/PLAN:   Reviewed patient's own XR images with him which demonstrate stable instrumentation, improved alignment compared to preop.  Recommended purchase of home over-the-door cervical traction unit to help with cervical thoracic muscle spasms.  Also demonstrated use of trigger point injections, instructed mom on how to do this at home.  Discussed medication management.  Discussed activity restrictions at this time; may advance to 20 pound lifting restriction, and gradually increase activities, but avoid impact sports.    - advised trial of home over door cervical traction unit (use 5-10lb)  - Wean off gabapentin as tolerated. Ok to contintue occasional Methocarbamol PRN. Continue with tylenol as needed.  Hold off on NSAID use until 3 months postop.  - Ok to Return to work part-time (3 days a week, 4-hour shifts, max lifting limit 20 pounds). Letter provided.  - Okay to shoot hoops with basketball, but no contact basketball/ sports until after 3 months post-op  - follow up at 3 months post-op with repeat EOS full body standing AP/lat XR      All questions and concerns were answered to the patient's apparent satisfaction before leaving the clinic.     Thank you for  allowing Dr. Torres and I to participate in the care of Hitesh Dobson.    Respectfully,  Breanna Simons PA-C    I saw and evaluated the patient and developed the plan.  Jayson Torres MD      CC  Copy to patient    [address]  [place] [address]

## 2024-06-27 NOTE — NURSING NOTE
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 MD: EST    ?  No  Occupation Student/ Maintenance     Date of injury: no  Type of injury: no.     Date of surgery: 5/21/24  Type of surgery: Posterior Spinal Fusion Thoracic 3-Lumbar 1, Segmental Instrumentation, Hernandez Gallo Osteotomies Thoracic 6-11, thoracic 8-9 discetomy,      Smoker: No  Request smoking cessation information: No    Ht 1.84 m (6' 0.44\")   Wt 64 kg (141 lb)   BMI 18.89 kg/m      Pain Assessment  Patient Currently in Pain: Yes  0-10 Pain Scale: 4  Primary Pain Location: Back    Oswestry (LUIS) Questionnaire        6/27/2024     8:51 AM   OSWESTRY DISABILITY INDEX   Count 9   Sum 9   Oswestry Score (%) 20 %            Neck Disability Index (NDI) Questionnaire         No data to display                       Visual Analog Pain Scale  Back Pain Scale 0-10: 3.5  Right leg pain: 0  Left leg pain: 0  Neck Pain Scale 0-10: 0  Right arm pain: 0  Left arm pain: 0    Promis 10 Assessment        6/27/2024     8:53 AM   PROMIS 10   In general, would you say your health is: Very good   In general, would you say your quality of life is: Very good   In general, how would you rate your physical health? Good   In general, how would you rate your mental health, including your mood and your ability to think? Good   In general, how would you rate your satisfaction with your social activities and relationships? Very good   In general, please rate how well you carry out your usual social activities and roles Very good   To what extent are you able to carry out your everyday physical activities such as walking, climbing stairs, carrying groceries, or moving a chair? Completely   In the past 7 days, how often have you been bothered by emotional problems such as feeling anxious, depressed, or irritable? Rarely   In the " past 7 days, how would you rate your fatigue on average? Moderate   In the past 7 days, how would you rate your pain on average, where 0 means no pain, and 10 means worst imaginable pain? 4   In general, would you say your health is: 4   In general, would you say your quality of life is: 4   In general, how would you rate your physical health? 3   In general, how would you rate your mental health, including your mood and your ability to think? 3   In general, how would you rate your satisfaction with your social activities and relationships? 4   In general, please rate how well you carry out your usual social activities and roles. (This includes activities at home, at work and in your community, and responsibilities as a parent, child, spouse, employee, friend, etc.) 4   To what extent are you able to carry out your everyday physical activities such as walking, climbing stairs, carrying groceries, or moving a chair? 5   In the past 7 days, how often have you been bothered by emotional problems such as feeling anxious, depressed, or irritable? 2   In the past 7 days, how would you rate your fatigue on average? 3   In the past 7 days, how would you rate your pain on average, where 0 means no pain, and 10 means worst imaginable pain? 4   Global Mental Health Score 15   Global Physical Health Score 14   PROMIS TOTAL - SUBSCORES 29                Kimberly Reed LPN

## 2024-07-02 ENCOUNTER — MYC MEDICAL ADVICE (OUTPATIENT)
Dept: ORTHOPEDICS | Facility: CLINIC | Age: 19
End: 2024-07-02
Payer: COMMERCIAL

## 2024-07-03 NOTE — TELEPHONE ENCOUNTER
See My Chart messages from pt & replies from triage.  Just seen in clinic Th 6-27-24 DOS 5-21-24.    I called pt C/O New Left back pain & Right arm ache that started 2 days ago.  NO  Fall or known injury & stated fells like he pulled or strained a muscle.  Stated feels like he slept wrong or moved wrong.  Did not return to work yet until next week & did not resume basketball yet.  Taking Tylenol & Gabapentin & Robaxin as ordered & did not yet start weaning off Gabapentin yet as advised.  Has enough of all meds & knows NO NSAIDS until 3 mos postop.  Advised RICE or heat prn & try OTC patches prn & call back if does not improve.  RTC POP appt 8-8-24.  Pt agreed.    Rizwana Salcido RN,.

## 2024-07-08 ENCOUNTER — TELEPHONE (OUTPATIENT)
Dept: ORTHOPEDICS | Facility: CLINIC | Age: 19
End: 2024-07-08
Payer: COMMERCIAL

## 2024-07-08 NOTE — TELEPHONE ENCOUNTER
Other: Requesting c/b from Dr Torres's nurse- regarding pulled muscle     Could we send this information to you in [employer] or would you prefer to receive a phone call?:   Patient would prefer a phone call   Okay to leave a detailed message?: No at Cell number on file:    Telephone Information:   Mobile [phone]

## 2024-07-27 NOTE — PROGRESS NOTES
Prior Authorization **APPROVED**    Authorization Effective Date: 4/25/2024  Authorization Expiration Date: 11/25/2024  Medication: HYDROMORPHONE HCL 4 MG PO TABS  Approved Dose/Quantity: 10.3 tabs daily  Reference #: CoverMyMeds Key: [id number] - [place] Case ID: [id number]   Insurance Company: [employer] [place] - Phone [phone] Fax [phone]  Expected CoPay: $ 0    CoPay Card Available: No    Foundation Assistance Needed: -  Which Pharmacy is filling the prescription (Not needed for infusion/clinic administered): [facility] - [place], [place] - [address]  Pharmacy Notified: Yes  Patient Notified: Yes  Comments:  Plan limits to 6 tablets daily. Discharge pharmacy sent patient with supply while auth is pending. *Retroactively Billed*          Tamy Olivera CPhT  Discharge Pharmacy Liaison  [facility]  Pronouns: She/Her/Hers    Securely message with [employer], [employer] Secure Chat, or [employer]  Phone: [phone]  Fax: [phone]  [email or web address]

## 2024-08-05 DIAGNOSIS — Z98.1 S/P FUSION OF THORACIC SPINE: ICD-10-CM

## 2024-08-05 DIAGNOSIS — M42.00 SCHEUERMANN'S KYPHOSIS: Primary | ICD-10-CM

## 2024-08-08 ENCOUNTER — OFFICE VISIT (OUTPATIENT)
Dept: ORTHOPEDICS | Facility: CLINIC | Age: 19
End: 2024-08-08
Payer: COMMERCIAL

## 2024-08-08 ENCOUNTER — ANCILLARY PROCEDURE (OUTPATIENT)
Dept: GENERAL RADIOLOGY | Facility: CLINIC | Age: 19
End: 2024-08-08
Attending: ORTHOPAEDIC SURGERY
Payer: COMMERCIAL

## 2024-08-08 VITALS — HEIGHT: 72 IN | WEIGHT: 141.9 LBS | BODY MASS INDEX: 19.22 KG/M2

## 2024-08-08 DIAGNOSIS — M42.00 SCHEUERMANN'S KYPHOSIS: ICD-10-CM

## 2024-08-08 DIAGNOSIS — Z98.1 S/P FUSION OF THORACIC SPINE: ICD-10-CM

## 2024-08-08 DIAGNOSIS — Z98.1 S/P FUSION OF THORACIC SPINE: Primary | ICD-10-CM

## 2024-08-08 PROCEDURE — 77073 BONE LENGTH STUDIES: CPT | Performed by: STUDENT IN AN ORGANIZED HEALTH CARE EDUCATION/TRAINING PROGRAM

## 2024-08-08 PROCEDURE — 99024 POSTOP FOLLOW-UP VISIT: CPT | Performed by: ORTHOPAEDIC SURGERY

## 2024-08-08 PROCEDURE — 72082 X-RAY EXAM ENTIRE SPI 2/3 VW: CPT | Performed by: STUDENT IN AN ORGANIZED HEALTH CARE EDUCATION/TRAINING PROGRAM

## 2024-08-08 NOTE — PROGRESS NOTES
"REASON FOR VISIT: RECHECK    REFERRING PHYSICIAN: Referred Self     PRIMARY CARE PHYSICIAN: No Ref-Primary, Physician    HISTORY OF PRESENT ILLNESS: Hitesh Dobson is a 19 year old male who presents for follow-up on 12 weeks s/p T3-L1 PSF.      He is doing great.  Denies any radicular symptoms.  Denies any weakness.  Denies any bowel or bladder changes.  Currently following lifting restriction to 20 pounds.    Oswestry score:   Oswestry (LUIS) Questionnaire        8/8/2024     8:28 AM   OSWESTRY DISABILITY INDEX   Count 10   Sum 5   Oswestry Score (%) 10 %     PROMIS-10 Scores        4/24/2024    10:36 AM 6/27/2024     8:53 AM 8/8/2024     8:29 AM   PROMIS-10 Total Score w/o Sub Scores   PROMIS TOTAL - SUBSCORES 28 29 23     Visual Analog Scale (VAS) Questionnaire        8/8/2024     8:45 AM   VISUAL ANALOG PAIN SCALE   Back Pain Scale 0-10 2   Right leg pain 0   Left leg pain 0   Neck Pain Scale 0-10 0   Right arm pain 0   Left arm pain 0      PHYSICAL EXAM:  Vitals: Ht 1.823 m (5' 11.77\")   Wt 64.4 kg (141 lb 14.4 oz)   BMI 19.37 kg/m    Constitutional: Patient is healthy, well-nourished and appears stated age.  Respiratory: Patient is breathing normally and in no respiratory distress.  Skin: No suspicious rashes or lesions. Incision: approximated with hypertrophic scarring.   Gait: Non-antalgic gait without use of assistive devices. Able to tandem gait without difficulty.   Neurologic - Sensation intact to light touch bilaterally.  Musculoskeletal: Strength: 5 out of 5 bilateral hip flexion knee extension plantarflexion.   Spine: overall good sagittal balance        IMAGING:   The following imaging was independently reviewed and interpreted in clinic. See full radiologic report in chart.    EOS total body 8/8/24    No hardware compromise.  Improvement of thoracic kyphosis.        CLINICAL ASSESSMENT:   Hitesh Dobson is a 19 year old male    12 weeks status post T3-L1 posterior spinal fusion doing " great    DISCUSSION/PLAN:   He is doing awesome since his fusion.  Educated to increase lifting restrictions of 40 pounds.  Alleviated from twisting turning bending restrictions.  Plan to work return to work following restrictions of 40 pounds.  Plan to return to clinic in 3 months with repeat EOS total body.  Educated no hardware concerns on his recent x-rays.    - RTC in 3 months with repeat EOS total body.    All questions and concerns were answered to the patient's apparent satisfaction before leaving the clinic. We used models, the patients imaging, and drawn diagrams to explain the pathophysiology, and treatments options including surgical and non-surgical.     Thank you for allowing Dr. Torres and I to participate in the care of Hitesh Dobson. The above plan was formulated by Dr. Torres who also saw and examined the patient.     Respectfully,  CE Montes saw and evaluated the patient and developed the plan.  Jayson Torres MD

## 2024-08-08 NOTE — LETTER
"    8/8/2024         [facility] [address]  [address] [facility] [facility] [place] [address]      REASON FOR VISIT: RECHECK    REFERRING PHYSICIAN: Referred Self     PRIMARY CARE PHYSICIAN: No Ref-Primary, Physician    HISTORY OF PRESENT ILLNESS: Hitesh Dobson is a 19 year old male who presents for follow-up on 12 weeks s/p T3-L1 PSF.      He is doing great.  Denies any radicular symptoms.  Denies any weakness.  Denies any bowel or bladder changes.  Currently following lifting restriction to 20 pounds.    Oswestry score:   Oswestry (LUIS) Questionnaire        8/8/2024     8:28 AM   OSWESTRY DISABILITY INDEX   Count 10   Sum 5   Oswestry Score (%) 10 %     PROMIS-10 Scores        4/24/2024    10:36 AM 6/27/2024     8:53 AM 8/8/2024     8:29 AM   PROMIS-10 Total Score w/o Sub Scores   PROMIS TOTAL - SUBSCORES 28 29 23     Visual Analog Scale (VAS) Questionnaire        8/8/2024     8:45 AM   VISUAL ANALOG PAIN SCALE   Back Pain Scale 0-10 2   Right leg pain 0   Left leg pain 0   Neck Pain Scale 0-10 0   Right arm pain 0   Left arm pain 0      PHYSICAL EXAM:  Vitals: Ht 1.823 m (5' 11.77\")   Wt 64.4 kg (141 lb 14.4 oz)   BMI 19.37 kg/m    Constitutional: Patient is healthy, well-nourished and appears stated age.  Respiratory: Patient is breathing normally and in no respiratory distress.  Skin: No suspicious rashes or lesions. Incision: approximated with hypertrophic scarring.   Gait: Non-antalgic gait without use of assistive devices. Able to tandem gait without difficulty.   Neurologic - Sensation intact to light touch bilaterally.  Musculoskeletal: Strength: 5 out of 5 bilateral hip flexion knee extension plantarflexion.   Spine: overall good sagittal balance        IMAGING:   The following imaging was independently reviewed and interpreted in clinic. See full radiologic report in chart.    EOS total body 8/8/24    No hardware compromise.  Improvement of thoracic kyphosis.        CLINICAL ASSESSMENT:   Hitesh Dobson is a 19 year old " male    12 weeks status post T3-L1 posterior spinal fusion doing great    DISCUSSION/PLAN:   He is doing awesome since his fusion.  Educated to increase lifting restrictions of 40 pounds.  Alleviated from twisting turning bending restrictions.  Plan to work return to work following restrictions of 40 pounds.  Plan to return to clinic in 3 months with repeat EOS total body.  Educated no hardware concerns on his recent x-rays.    - RTC in 3 months with repeat EOS total body.    All questions and concerns were answered to the patient's apparent satisfaction before leaving the clinic. We used models, the patients imaging, and drawn diagrams to explain the pathophysiology, and treatments options including surgical and non-surgical.     Thank you for allowing Dr. Torres and I to participate in the care of Hitesh Dobson. The above plan was formulated by Dr. Torres who also saw and examined the patient.     Respectfully,  CE Montes saw and evaluated the patient and developed the plan.  MD Jayson Balderas MD

## 2024-08-08 NOTE — LETTER
Return to Work  2024     Seen today: Yes    Patient:  Hitesh Dobson  :   2005  MRN:     [id number]  Physician: JAYSON TORRES    Hitesh Dobson may return to work on Date: 24.  Educated that he can return to work on restrictions of 40 pounds.  No other limitations with working.  Next follow-up appointment will be in 3 months.      Patient limitations:    No lifting greater than 20 pounds            Electronically signed by Jayson Torres MD

## 2024-08-08 NOTE — NURSING NOTE
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 MD: EST    ?  No  Occupation Student/ Maintenance     Date of injury: no  Type of injury: no.     Date of surgery: 5/21/24  Type of surgery: Posterior Spinal Fusion Thoracic 3-Lumbar 1, Segmental Instrumentation, Hernandez Gallo Osteotomies Thoracic 6-11, thoracic 8-9 discetomy,      Smoker: No  Request smoking cessation information: No    Ht 1.823 m (5' 11.77\")   Wt 64.4 kg (141 lb 14.4 oz)   BMI 19.37 kg/m      Pain Assessment  Patient Currently in Pain: Yes  0-10 Pain Scale: 2  Primary Pain Location: Back    Oswestry (LUIS) Questionnaire        8/8/2024     8:28 AM   OSWESTRY DISABILITY INDEX   Count 10   Sum 5   Oswestry Score (%) 10 %        Visual Analog Pain Scale  Back Pain Scale 0-10: 2  Right leg pain: 0  Left leg pain: 0  Neck Pain Scale 0-10: 0  Right arm pain: 0  Left arm pain: 0    Promis 10 Assessment        8/8/2024     8:29 AM   PROMIS 10   In general, would you say your health is: Good   In general, would you say your quality of life is: Good   In general, how would you rate your physical health? Good   In general, how would you rate your mental health, including your mood and your ability to think? Fair   In general, how would you rate your satisfaction with your social activities and relationships? Good   In general, please rate how well you carry out your usual social activities and roles Good   To what extent are you able to carry out your everyday physical activities such as walking, climbing stairs, carrying groceries, or moving a chair? Moderately   In the past 7 days, how often have you been bothered by emotional problems such as feeling anxious, depressed, or irritable? Often   In the past 7 days, how would you rate your fatigue on average? Moderate   In the past 7 days, how would you rate " your pain on average, where 0 means no pain, and 10 means worst imaginable pain? 2   In general, would you say your health is: 3   In general, would you say your quality of life is: 3   In general, how would you rate your physical health? 3   In general, how would you rate your mental health, including your mood and your ability to think? 2   In general, how would you rate your satisfaction with your social activities and relationships? 3   In general, please rate how well you carry out your usual social activities and roles. (This includes activities at home, at work and in your community, and responsibilities as a parent, child, spouse, employee, friend, etc.) 3   To what extent are you able to carry out your everyday physical activities such as walking, climbing stairs, carrying groceries, or moving a chair? 3   In the past 7 days, how often have you been bothered by emotional problems such as feeling anxious, depressed, or irritable? 4   In the past 7 days, how would you rate your fatigue on average? 3   In the past 7 days, how would you rate your pain on average, where 0 means no pain, and 10 means worst imaginable pain? 2   Global Mental Health Score 10   Global Physical Health Score 13   PROMIS TOTAL - SUBSCORES 23                Kimberly Reed LPN

## 2024-08-08 NOTE — LETTER
"8/8/2024      Hitesh Dobson  [address] [facility] [address]      Dear Colleague,    Thank you for referring your patient, Hitesh Dobson, to the [facility] [place]. Please see a copy of my visit note below.    REASON FOR VISIT: RECHECK    REFERRING PHYSICIAN: Referred Self     PRIMARY CARE PHYSICIAN: No Ref-Primary, Physician    HISTORY OF PRESENT ILLNESS: Hitesh Dobson is a 19 year old male who presents for follow-up on 12 weeks s/p T3-L1 PSF.      He is doing great.  Denies any radicular symptoms.  Denies any weakness.  Denies any bowel or bladder changes.  Currently following lifting restriction to 20 pounds.    Oswestry score:   Oswestry (LUIS) Questionnaire        8/8/2024     8:28 AM   OSWESTRY DISABILITY INDEX   Count 10   Sum 5   Oswestry Score (%) 10 %     PROMIS-10 Scores        4/24/2024    10:36 AM 6/27/2024     8:53 AM 8/8/2024     8:29 AM   PROMIS-10 Total Score w/o Sub Scores   PROMIS TOTAL - SUBSCORES 28 29 23     Visual Analog Scale (VAS) Questionnaire        8/8/2024     8:45 AM   VISUAL ANALOG PAIN SCALE   Back Pain Scale 0-10 2   Right leg pain 0   Left leg pain 0   Neck Pain Scale 0-10 0   Right arm pain 0   Left arm pain 0      PHYSICAL EXAM:  Vitals: Ht 1.823 m (5' 11.77\")   Wt 64.4 kg (141 lb 14.4 oz)   BMI 19.37 kg/m    Constitutional: Patient is healthy, well-nourished and appears stated age.  Respiratory: Patient is breathing normally and in no respiratory distress.  Skin: No suspicious rashes or lesions. Incision: approximated with hypertrophic scarring.   Gait: Non-antalgic gait without use of assistive devices. Able to tandem gait without difficulty.   Neurologic - Sensation intact to light touch bilaterally.  Musculoskeletal: Strength: 5 out of 5 bilateral hip flexion knee extension plantarflexion.   Spine: overall good sagittal balance        IMAGING:   The following imaging was independently reviewed and interpreted in clinic. See full radiologic " report in chart.    EOS total body 8/8/24    No hardware compromise.  Improvement of thoracic kyphosis.        CLINICAL ASSESSMENT:   Hitesh Dobson is a 19 year old male    12 weeks status post T3-L1 posterior spinal fusion doing great    DISCUSSION/PLAN:   He is doing awesome since his fusion.  Educated to increase lifting restrictions of 40 pounds.  Alleviated from twisting turning bending restrictions.  Plan to work return to work following restrictions of 40 pounds.  Plan to return to clinic in 3 months with repeat EOS total body.  Educated no hardware concerns on his recent x-rays.    - RTC in 3 months with repeat EOS total body.    All questions and concerns were answered to the patient's apparent satisfaction before leaving the clinic. We used models, the patients imaging, and drawn diagrams to explain the pathophysiology, and treatments options including surgical and non-surgical.     Thank you for allowing Dr. Torres and I to participate in the care of Hitesh Dobson. The above plan was formulated by Dr. Torres who also saw and examined the patient.     Respectfully,  CE Montes saw and evaluated the patient and developed the plan.  Jayson Torres MD           Again, thank you for allowing me to participate in the care of your patient.        Sincerely,        Jayson Torres MD

## 2024-11-08 DIAGNOSIS — Z98.1 S/P FUSION OF THORACIC SPINE: Primary | ICD-10-CM

## 2024-11-14 ENCOUNTER — OFFICE VISIT (OUTPATIENT)
Dept: ORTHOPEDICS | Facility: CLINIC | Age: 19
End: 2024-11-14
Payer: COMMERCIAL

## 2024-11-14 ENCOUNTER — ANCILLARY PROCEDURE (OUTPATIENT)
Dept: GENERAL RADIOLOGY | Facility: CLINIC | Age: 19
End: 2024-11-14
Attending: ORTHOPAEDIC SURGERY
Payer: COMMERCIAL

## 2024-11-14 VITALS — WEIGHT: 147 LBS | HEIGHT: 73 IN | BODY MASS INDEX: 19.48 KG/M2

## 2024-11-14 DIAGNOSIS — Z98.1 S/P FUSION OF THORACIC SPINE: ICD-10-CM

## 2024-11-14 DIAGNOSIS — M54.6 PAIN IN THORACIC SPINE: ICD-10-CM

## 2024-11-14 DIAGNOSIS — M89.8X1 PAIN IN SCAPULA: Primary | ICD-10-CM

## 2024-11-14 DIAGNOSIS — M51.24 THORACIC DISC HERNIATION: ICD-10-CM

## 2024-11-14 DIAGNOSIS — M42.00 SCHEUERMANN'S KYPHOSIS: ICD-10-CM

## 2024-11-14 PROCEDURE — 99213 OFFICE O/P EST LOW 20 MIN: CPT | Mod: GC | Performed by: ORTHOPAEDIC SURGERY

## 2024-11-14 PROCEDURE — 77073 BONE LENGTH STUDIES: CPT | Performed by: STUDENT IN AN ORGANIZED HEALTH CARE EDUCATION/TRAINING PROGRAM

## 2024-11-14 PROCEDURE — 72082 X-RAY EXAM ENTIRE SPI 2/3 VW: CPT | Performed by: STUDENT IN AN ORGANIZED HEALTH CARE EDUCATION/TRAINING PROGRAM

## 2024-11-14 NOTE — PROGRESS NOTES
"Date of Service: Nov 14, 2024    Chief Complaint:   Chief Complaint   Patient presents with    RECHECK     DOS 5-21-24 PSF T3-L1 for Thoracic Disc & Scheuermanns Kyphosis        Interval events: Hitesh Dobson is a 19 year old male who presents today in follow-up with his mother 6 months after undergoing posterior instrumented spinal fusion from T3-L1 Scheuermann's kyphosis.    Patient describes the progress he has made after surgery as \"pretty good.\"  He reports that the back pain he was experiencing prior to his surgery has resolved and now he has mild and intermittent right sided back pain just medial to his scapula which occurs with specific activities, specifically scapular retraction/protraction.  He reports that he has returned back to all of his previous activities prior to surgery and otherwise is happy with the result of his surgery.    Past medical, surgical, and social history were reviewed and updated as needed.    Oswestry (LUIS) Questionnaire        11/14/2024     8:10 AM   OSWESTRY DISABILITY INDEX   Count 9    Sum 5    Oswestry Score (%) 11.11 %        Patient-reported       Visual Analog Scale (VAS) Questionnaire        11/14/2024     8:08 AM   VISUAL ANALOG PAIN SCALE   Back Pain Scale 0-10 1   Right leg pain 0   Left leg pain 0   Neck Pain Scale 0-10 0   Right arm pain 0   Left arm pain 0      SRS Score: 60%    Physical examination:  The patient is well-developed, well nourished and in on acute distress. The patient is alert and oriented to the surroundings. Behavior is appropriate to the surroundings. Extra-ocular motions are intact. Respirations appear unlabored.     Incision: Well-healed without outward evidence of infection    Cervical spine:    Appearance -no gross step-offs, kyphosis.    Motor -     C5: Deltoids R 5/5 and L 5/5 strength    C6: Biceps R 5/5 and L 5/5 strength     C7: Triceps R 5/5 and L 5/5 strength     C8:  R 5/5 and L 5/5 strength     T1: Dorsal interossei R 4/5 and L " 4/5 strength        Sensation: intact to light touch in C5-T1       Lumbar Spine:    Appearance - No gross stepoffs or deformities    Motor -     L2-3: Hip flexion 5/5 R and 5/5 L strength          L3/4:  Knee extension R 5/5 and L 5/5 strength         L4/5:  Foot dorsiflexion R 5/5 L 5/5 and       EHL dorsiflexion R 4/5 L 4/5 strength         S1:  Plantarflexion/Peroneal Muscles  R 5/5 and L 5/5 strength    Sensation: intact to light touch L3-S1 distribution BLE    Radiographs:       EOS x-rays obtained 11/14/2024 independently reviewed and compared to preoperative EOS imaging obtained 10/12/2023 which demonstrates significant improvement in thoracic kyphosis as well as decrease in compensatory lumbar lordosis.  Postoperative x-rays obtained 11/14/2024 compared to immediate postoperative imaging obtained 6/27/2024 which demonstrates stable posterior instrumented fusion implants without evidence of screw loosening, screw backout, carole breakage, or set plug dislodgment.    Assessment: 19 year old male 6-month status post T3-L1 posterior instrumented spinal fusion with Smith-Gallo osteotomies at T6-11 and T8-9 TTIF.  Patient is doing well, recovering as expected, dealing with mild parascapular pain that is activity related.    We reviewed the imaging with the patient commenting that the instrumentation appears stable and demonstrating to the patient and his mother the deformity correction we were able to achieve with surgery.  His only ongoing concern is the mild, intermittent pain medial to his right shoulder blade with specific activities related to scapular retraction and protraction.  We explained that this can be a common issue that patients' experience after undergoing a surgery such as the one he has undergone.  We recommended that he see a physical therapist that specializes in scapulothoracic motion to work on improving scapular kinetics and strength.  We counseled him that he would likely only need to be  seen for 1-2 visits to evaluate his kinematics and then at that point he could likely perform a home exercise program on his own and continue to see a physical therapist closer to where he lives.  Will plan to have him follow-up in 6 months (at the 1 year postop viji) with Dr. Alvarez.      Plan:  - Referral for scapulothoracic specific physical therapy  - Follow-up with Dr. Alvarez in 6 months (1 year postoperative visit)    Ample time and opportunity was provided to ask questions, all of which were answered to the patient's satisfaction prior to the conclusion of the visit today.    Assessment and plan discussed with Dr. Torres who is in agreement with the above.    Voice-to-text dictation software was utilized in the creation of this note therefore there may be unintended word substitutions, although errors are generally corrected real-time, there is the potential for a rare error to be present in the completed chart. Please do not hesitate to reach out for clarification.    Pilo Bryan MD  Orthopaedic Surgery Resident  [facility]  11/14/24    I saw and evaluated the patient and developed the plan.  Patient is doing quite well.  He does appear to have a little bit of right-sided potential scapulothoracic dysfunction.  We will refer him to PT for this specifically.  I would like him to do 1 or 2 visits with one of our therapists here in town for this and then he can have them coordinate with his therapist closer to home.  His imaging shows excellent correction.  There is no evidence of screw loosening or junctional issues.  I independently ordered and interpreted these imaging studies today.  He should be seen at 1 year from the surgery and then perhaps again at 2 years and then perhaps again at 5 years.  He and his mom asked appropriate questions and had them answered to their apparent satisfaction.    Jayson Torres MD

## 2024-11-14 NOTE — NURSING NOTE
"Reason For Visit:   Chief Complaint   Patient presents with    RECHECK     DOS 5-21-24 PSF T3-L1 for Thoracic Disc & Scheuermanns Kyphosis        Primary MD: No Ref-Primary, Physician  Ref. MD: ESt    ?  No  Occupation Student/ Maintenance     Date of injury: no  Type of injury: no.     Date of surgery: 5/21/24  Type of surgery: Posterior Spinal Fusion Thoracic 3-Lumbar 1, Segmental Instrumentation, Hernandez Gallo Osteotomies Thoracic 6-11, thoracic 8-9 discetomy,      Smoker: No  Request smoking cessation information: No    Ht 1.853 m (6' 0.95\")   Wt 66.7 kg (147 lb)   BMI 19.42 kg/m      Pain Assessment  Patient Currently in Pain: Yes  0-10 Pain Scale: 1  Primary Pain Location: Back    SRS 60%    Oswestry (LUIS) Questionnaire        11/14/2024     8:10 AM   OSWESTRY DISABILITY INDEX   Count 9    Sum 5    Oswestry Score (%) 11.11 %        Patient-reported        Visual Analog Pain Scale  Back Pain Scale 0-10: 1  Right leg pain: 0  Left leg pain: 0  Neck Pain Scale 0-10: 0  Right arm pain: 0  Left arm pain: 0    Promis 10 Assessment        11/14/2024     8:11 AM   PROMIS 10   In general, would you say your health is: Very good   In general, would you say your quality of life is: Very good   In general, how would you rate your physical health? Very good   In general, how would you rate your mental health, including your mood and your ability to think? Good   In general, how would you rate your satisfaction with your social activities and relationships? Very good   In general, please rate how well you carry out your usual social activities and roles Very good   To what extent are you able to carry out your everyday physical activities such as walking, climbing stairs, carrying groceries, or moving a chair? Completely   In the past 7 days, how often have you been bothered by emotional problems such as feeling anxious, depressed, or irritable? Sometimes   In the past 7 days, how would you rate your fatigue " on average? Mild   In the past 7 days, how would you rate your pain on average, where 0 means no pain, and 10 means worst imaginable pain? 3   In general, would you say your health is: 4    In general, would you say your quality of life is: 4    In general, how would you rate your physical health? 4    In general, how would you rate your mental health, including your mood and your ability to think? 3    In general, how would you rate your satisfaction with your social activities and relationships? 4    In general, please rate how well you carry out your usual social activities and roles. (This includes activities at home, at work and in your community, and responsibilities as a parent, child, spouse, employee, friend, etc.) 4    To what extent are you able to carry out your everyday physical activities such as walking, climbing stairs, carrying groceries, or moving a chair? 5    In the past 7 days, how often have you been bothered by emotional problems such as feeling anxious, depressed, or irritable? 3    In the past 7 days, how would you rate your fatigue on average? 2    In the past 7 days, how would you rate your pain on average, where 0 means no pain, and 10 means worst imaginable pain? 3    Global Mental Health Score 14    Global Physical Health Score 17    PROMIS TOTAL - SUBSCORES 31        Patient-reported                Kimberly Reed LPN

## 2024-11-14 NOTE — LETTER
"11/14/2024      Hitesh Dobson  [address] [place] [address]      Dear Colleague,    Thank you for referring your patient, Hitesh Dobson, to the [facility] [place]. Please see a copy of my visit note below.    Date of Service: Nov 14, 2024    Chief Complaint:   Chief Complaint   Patient presents with     RECHECK     DOS 5-21-24 PSF T3-L1 for Thoracic Disc & Scheuermanns Kyphosis        Interval events: Hitesh Dobson is a 19 year old male who presents today in follow-up with his mother 6 months after undergoing posterior instrumented spinal fusion from T3-L1 Scheuermann's kyphosis.    Patient describes the progress he has made after surgery as \"pretty good.\"  He reports that the back pain he was experiencing prior to his surgery has resolved and now he has mild and intermittent right sided back pain just medial to his scapula which occurs with specific activities, specifically scapular retraction/protraction.  He reports that he has returned back to all of his previous activities prior to surgery and otherwise is happy with the result of his surgery.    Past medical, surgical, and social history were reviewed and updated as needed.    Oswestry (LUIS) Questionnaire        11/14/2024     8:10 AM   OSWESTRY DISABILITY INDEX   Count 9    Sum 5    Oswestry Score (%) 11.11 %        Patient-reported       Visual Analog Scale (VAS) Questionnaire        11/14/2024     8:08 AM   VISUAL ANALOG PAIN SCALE   Back Pain Scale 0-10 1   Right leg pain 0   Left leg pain 0   Neck Pain Scale 0-10 0   Right arm pain 0   Left arm pain 0      SRS Score: 60%    Physical examination:  The patient is well-developed, well nourished and in on acute distress. The patient is alert and oriented to the surroundings. Behavior is appropriate to the surroundings. Extra-ocular motions are intact. Respirations appear unlabored.     Incision: Well-healed without outward evidence of infection    Cervical " spine:    Appearance -no gross step-offs, kyphosis.    Motor -     C5: Deltoids R 5/5 and L 5/5 strength    C6: Biceps R 5/5 and L 5/5 strength     C7: Triceps R 5/5 and L 5/5 strength     C8:  R 5/5 and L 5/5 strength     T1: Dorsal interossei R 4/5 and L 4/5 strength        Sensation: intact to light touch in C5-T1       Lumbar Spine:    Appearance - No gross stepoffs or deformities    Motor -     L2-3: Hip flexion 5/5 R and 5/5 L strength          L3/4:  Knee extension R 5/5 and L 5/5 strength         L4/5:  Foot dorsiflexion R 5/5 L 5/5 and       EHL dorsiflexion R 4/5 L 4/5 strength         S1:  Plantarflexion/Peroneal Muscles  R 5/5 and L 5/5 strength    Sensation: intact to light touch L3-S1 distribution BLE    Radiographs:       EOS x-rays obtained 11/14/2024 independently reviewed and compared to preoperative EOS imaging obtained 10/12/2023 which demonstrates significant improvement in thoracic kyphosis as well as decrease in compensatory lumbar lordosis.  Postoperative x-rays obtained 11/14/2024 compared to immediate postoperative imaging obtained 6/27/2024 which demonstrates stable posterior instrumented fusion implants without evidence of screw loosening, screw backout, carole breakage, or set plug dislodgment.    Assessment: 19 year old male 6-month status post T3-L1 posterior instrumented spinal fusion with Smith-Gallo osteotomies at T6-11 and T8-9 TTIF.  Patient is doing well, recovering as expected, dealing with mild parascapular pain that is activity related.    We reviewed the imaging with the patient commenting that the instrumentation appears stable and demonstrating to the patient and his mother the deformity correction we were able to achieve with surgery.  His only ongoing concern is the mild, intermittent pain medial to his right shoulder blade with specific activities related to scapular retraction and protraction.  We explained that this can be a common issue that patients' experience  after undergoing a surgery such as the one he has undergone.  We recommended that he see a physical therapist that specializes in scapulothoracic motion to work on improving scapular kinetics and strength.  We counseled him that he would likely only need to be seen for 1-2 visits to evaluate his kinematics and then at that point he could likely perform a home exercise program on his own and continue to see a physical therapist closer to where he lives.  Will plan to have him follow-up in 6 months (at the 1 year postop viji) with Dr. Alvarez.      Plan:  - Referral for scapulothoracic specific physical therapy  - Follow-up with Dr. Alvarez in 6 months (1 year postoperative visit)    Ample time and opportunity was provided to ask questions, all of which were answered to the patient's satisfaction prior to the conclusion of the visit today.    Assessment and plan discussed with Dr. Torres who is in agreement with the above.    Voice-to-text dictation software was utilized in the creation of this note therefore there may be unintended word substitutions, although errors are generally corrected real-time, there is the potential for a rare error to be present in the completed chart. Please do not hesitate to reach out for clarification.    Pilo Bryan MD  Orthopaedic Surgery Resident  [facility]  11/14/24    I saw and evaluated the patient and developed the plan.  Patient is doing quite well.  He does appear to have a little bit of right-sided potential scapulothoracic dysfunction.  We will refer him to PT for this specifically.  I would like him to do 1 or 2 visits with one of our therapists here in town for this and then he can have them coordinate with his therapist closer to home.  His imaging shows excellent correction.  There is no evidence of screw loosening or junctional issues.  I independently ordered and interpreted these imaging studies today.  He should be seen at 1 year from the  surgery and then perhaps again at 2 years and then perhaps again at 5 years.  He and his mom asked appropriate questions and had them answered to their apparent satisfaction.    Jayson Torres MD        Again, thank you for allowing me to participate in the care of your patient.        Sincerely,        Jayson Torres MD

## 2025-02-25 NOTE — TELEPHONE ENCOUNTER
DIAGNOSIS: dr garcia patient. DOS 5-21-24 PSF T3-L1 for Thoracic Disc & Scheuermanns Kyphosis   APPOINTMENT DATE: 5/22/25   NOTES STATUS DETAILS   OFFICE NOTE from referring provider Internal 11/14/24, 8/8/24, 6/27/24 [facility]    See Additional Encounters   OFFICE NOTE from other specialist Care Everywhere 7/8/24 Mouna Rios, AAMIR-CNP  @[facility]    6/12/24 Lucy Lozano FNP @[facility]    4/24/24 Bri Mike, AAMIR CNP @[facility]-Pre Op    10/9/23 Evelin Cantu PA-C  @[facility]    8/1/22, 7/14/22 (Transferred Recs) @ [facility]     DISCHARGE SUMMARY from hospital Internal 5/21/24-5/27/24 Jayson Garcia MD @[facility]     OPERATIVE REPORT Internal 5/21/24 Jayson Garcia MD @[facility] Posterior Spinal Fusion Thoracic 3-Lumbar 1, Segmental Instrumentation, Hernandez Gallo Osteotomies Thoracic 6-11, thoracic 8-9 discetomy, transforaminal interbody fusion, insert intervertebral device      MEDICATION LIST Internal    MRI Internal  [facility]  9/28/23 MR Thoracic Spine  9/28/23 MR Cervical Spine     XRAYS (IMAGES & REPORTS) In process [facility]  11/14/24 XR EOS Total Body  8/8/24 XR EOS Total Body  6/27/24 XR EOS Total Body  5/24/24 XR Spine Complete  4/24/24 XR Lumbar Spine  4/24/24 XR EOS Total Body  10/12/23 XR EOS Total Body    [facility]  7/10/24 XR Thoracic Spine  7/10/24 XR Lumbar Spine  8/8/23 XR Spine Complete  5/15/23 XR Spine Complete       Records Requested   February 25, 2025 10:25 AM   [id number]   Facility  [facility]   Outcome 10:30 am Sent request for imaging to be pushed to PACS. BJ

## 2025-03-16 ENCOUNTER — HEALTH MAINTENANCE LETTER (OUTPATIENT)
Age: 20
End: 2025-03-16

## 2025-05-19 DIAGNOSIS — M51.24 THORACIC DISC HERNIATION: ICD-10-CM

## 2025-05-19 DIAGNOSIS — M89.8X1 PAIN IN SCAPULA: Primary | ICD-10-CM

## 2025-05-19 DIAGNOSIS — M54.6 PAIN IN THORACIC SPINE: ICD-10-CM

## 2025-05-19 DIAGNOSIS — Z98.1 S/P FUSION OF THORACIC SPINE: ICD-10-CM

## 2025-05-19 DIAGNOSIS — M42.00 SCHEUERMANN'S KYPHOSIS: ICD-10-CM

## 2025-05-20 NOTE — PROGRESS NOTES
In-Person Visit    Spine Surgical Hx:  05/21/2024 - PISF T3-L1; TTIF T8-9; SPO's T6-T11 (Melissa) for Scheuermann's kyphosis; HNP T8-9.  [Implants: Medtronic Solera screw system, 5.5 mm CoCr UNID rods x2; Capstone Control PEEK cage].      Last Visit Date: 11/14/24 (c/o Dr. Garcia)  Previous Impression:  19 year old male 6-month status post T3-L1 posterior instrumented spinal fusion with Smith-Gallo osteotomies at T6-11 and T8-9 TTIF.  Patient is doing well, recovering as expected, dealing with mild parascapular pain that is activity related.   Previous Plan:  - Referral for scapulothoracic specific physical therapy  - Follow-up with Dr. Alvarez in 6 months (1 year postoperative visit)  I saw and evaluated the patient and developed the plan.  Patient is doing quite well.  He does appear to have a little bit of right-sided potential scapulothoracic dysfunction.  We will refer him to PT for this specifically.  I would like him to do 1 or 2 visits with one of our therapists here in town for this and then he can have them coordinate with his therapist closer to home.  His imaging shows excellent correction.  There is no evidence of screw loosening or junctional issues.  I independently ordered and interpreted these imaging studies today.  He should be seen at 1 year from the surgery and then perhaps again at 2 years and then perhaps again at 5 years.  He and his mom asked appropriate questions and had them answered to their apparent satisfaction.       REASON FOR CONSULTATION: Consult (dr garcia patient. DOS 5-21-24 PSF T3-L1 for Thoracic Disc & Scheuermanns Kyphosis)     REFERRING PHYSICIAN: Jayson Garcia  PCP:No Ref-Primary, Physician      History of Present Illness:  20 year old male, here for transfer of care from Dr. Garcia (retired), 1 yr s/p PISF T3-L1 for Scheuermann's kyphosis.  This is my first encounter with patient.    Accompanied by mom Jen.  Doing very well.  Happy with surgery.  Posture improved  compared to preoperative.  Back pain also much improved.  Now back to doing full activities, normal activities.  No major complaints at this visit.    Going to college in [place], majoring in business economics.  Lives in [facility].      Oswestry (LUIS) Questionnaire        5/22/2025     8:38 AM   OSWESTRY DISABILITY INDEX   Count 10    Sum 6    Oswestry Score (%) 12 %        Patient-reported      S/p PISF T3-T11; TTIF T8-9; SPO's T6-T11 (5/21/24):  LUIS preop 24.44%  5wk  20%  2.5mo  10%  6mo  11.11%      Visual Analog Pain Scale  Back Pain Scale 0-10: 2 (low back pain)  Right leg pain: 0  Left leg pain: 0  Neck Pain Scale 0-10: 0  Right arm pain: 0  Left arm pain: 0    PROMIS-10 Scores  Global Mental Health Score: (Patient-Rptd) (P) 10  Global Physical Health Score: (Patient-Rptd) (P) 16  PROMIS TOTAL - SUBSCORES: (Patient-Rptd) (P) 26    ROS:  A 12-point review of systems was completed and is negative except for otherwise noted above in the history of present illness.    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       Surg Hx:  Past Surgical History:   Procedure Laterality Date    EXTRACTION(S) DENTAL      OPTICAL TRACKING SYSTEM FUSION POSTERIOR SPINE THORACIC THREE+ LEVELS N/A 5/21/2024    Procedure: Posterior Spinal Fusion Thoracic 3-Lumbar 1, Segmental Instrumentation, Hernandez Gallo Osteotomies Thoracic 6-11, thoracic 8-9 discetomy, transforaminal interbody fusion, insert intervertebral device;  Surgeon: Jayson Torres MD;  Location: [facility] OR       Allergies:  Allergies   Allergen Reactions    Eye Drops Allergy Relief [Tetrahydrozoline-Zn Sulfate]      Other reaction(s): rhinitis       Meds:  Current Outpatient Medications   Medication Sig Dispense Refill    acetaminophen (TYLENOL) 325 MG tablet Take 3 tablets (975 mg) by  "mouth every 8 hours (Patient not taking: Reported on 5/22/2025) 60 tablet 0    gabapentin (NEURONTIN) 300 MG capsule Take 3 capsules (900 mg) by mouth 3 times daily (Patient not taking: Reported on 11/14/2024) 540 capsule 0    HYDROmorphone (DILAUDID) 2 MG tablet Take 1-2 tablets (2-4 mg) by mouth every 4 hours as needed for severe pain (wean off as pain improves) (Patient not taking: Reported on 11/14/2024) 84 tablet 0    hydrOXYzine HCl (ATARAX) 25 MG tablet Take 1 tablet (25 mg) by mouth every 6 hours as needed for other or itching (adjuvant pain) (Patient not taking: Reported on 11/14/2024) 30 tablet 3    Methocarbamol 1000 MG TABS Take 1,000 mg by mouth 4 times daily (Patient not taking: Reported on 11/14/2024) 60 tablet 0    multivitamin w/minerals (MULTI-VITAMIN) tablet Take 1 tablet by mouth daily When remember's (Patient not taking: Reported on 11/14/2024)      polyethylene glycol (MIRALAX) 17 GM/Dose powder Take 17 g by mouth daily (Patient not taking: Reported on 11/14/2024) 510 g 3    senna-docusate (SENOKOT-S/PERICOLACE) 8.6-50 MG tablet Take 2 tablets by mouth 2 times daily (Patient not taking: Reported on 11/14/2024) 60 tablet 0     No current facility-administered medications for this visit.       Fam Hx:  Family History   Problem Relation Age of Onset    Other - See Comments Father        P/S Hx:  Social History     Tobacco Use    Smoking status: Never     Passive exposure: Never    Smokeless tobacco: Never   Substance Use Topics    Alcohol use: Yes     Comment: 1 -2 drinks on wkd's 'not alway's'         Physical Exam:  Very pleasant, healthy appearing, alert, oriented x 3, cooperative.  Normal mood and affect.  Not in cardiorespiratory distress.  Ht 1.849 m (6' 0.8\")   Wt 70.3 kg (155 lb)   BMI 20.56 kg/m    Normal upright posture.    Normal gait without assistive device.  No antalgia / imbalance.    Per patient, well-healed midline posterior surgical scar.  No significant back pain.    Neuro " Exam:  Neurologically intact motor and sensory both upper and both lower extremities.      Imaging:    EOS full body AP lateral standing x-rays taken today show stable bilateral segmental posterior instrumentation T3-L1; stable interbody cage T8-9.  Improved thoracic alignment, with reduced kyphosis compared to preoperative x-rays.  There are also has been improvement of compensatory lumbar hyperlordosis compared to preoperative.    Per patient request, we also went over all his preoperative x-rays.  The earliest x-ray was from 2017, which already showed some degree of thoracic hyperkyphosis.  There, however, was significant progression between 2017 and 2019, and has remained pretty stable until 2024 when he had surgery.    Assessment:    1.  1 year s/p PISF T3-L1, TTIF T8-9 (5/21/2024 Dr. Torres) for Scheuermann's kyphosis and thoracic HNP; doing very well, with improved preoperative back pain and improved posture.    Plan:    Congratulated and reassured patient and mom regarding my clinical and radiographic findings.  X-rays do not show any evidence of mechanical loosening or failure of his instrumentation; thus suggestive of already solid arthrodesis across all levels.  Alignment is maintained, and significantly improved versus preoperative.  No sign of junctional failure.  May continue with full activities as tolerated.    Return to clinic as needed.  15 minutes spent on the date of the encounter doing chart review/review of outside records/review of test results/interpretation of tests/patient visit/documentation/discussion with other provider(s)/discussion with patient and family.    Kalia Alvarez MD    Orthopaedic Spine Surgery  Dept Orthopaedic Surgery, [facility]  [phone] office, [phone] pager  [email or web address]

## 2025-05-22 ENCOUNTER — PRE VISIT (OUTPATIENT)
Dept: ORTHOPEDICS | Facility: CLINIC | Age: 20
End: 2025-05-22

## 2025-05-22 ENCOUNTER — OFFICE VISIT (OUTPATIENT)
Dept: ORTHOPEDICS | Facility: CLINIC | Age: 20
End: 2025-05-22
Payer: COMMERCIAL

## 2025-05-22 VITALS — HEIGHT: 73 IN | WEIGHT: 155 LBS | BODY MASS INDEX: 20.54 KG/M2

## 2025-05-22 DIAGNOSIS — Z98.1 S/P SPINAL FUSION: Primary | ICD-10-CM

## 2025-05-22 DIAGNOSIS — M42.00 SCHEUERMANN'S KYPHOSIS: ICD-10-CM

## 2025-05-22 NOTE — LETTER
5/22/2025      Hitesh Dobson  [address]  [place] [address]      Dear Colleague,    Thank you for referring your patient, Hitesh Dobson, to the [facility]. Please see a copy of my visit note below.    In-Person Visit    Spine Surgical Hx:  05/21/2024 - PISF T3-L1; TTIF T8-9; SPO's T6-T11 (Melissa) for Scheuermann's kyphosis; HNP T8-9.  [Implants: Medtronic Solera screw system, 5.5 mm CoCr UNID rods x2; Capstone Control PEEK cage].      Last Visit Date: 11/14/24 (c/o Dr. Garcia)  Previous Impression:  19 year old male 6-month status post T3-L1 posterior instrumented spinal fusion with Smith-Gallo osteotomies at T6-11 and T8-9 TTIF.  Patient is doing well, recovering as expected, dealing with mild parascapular pain that is activity related.   Previous Plan:  - Referral for scapulothoracic specific physical therapy  - Follow-up with Dr. Alvarez in 6 months (1 year postoperative visit)  I saw and evaluated the patient and developed the plan.  Patient is doing quite well.  He does appear to have a little bit of right-sided potential scapulothoracic dysfunction.  We will refer him to PT for this specifically.  I would like him to do 1 or 2 visits with one of our therapists here in [facility] for this and then he can have them coordinate with his therapist closer to home.  His imaging shows excellent correction.  There is no evidence of screw loosening or junctional issues.  I independently ordered and interpreted these imaging studies today.  He should be seen at 1 year from the surgery and then perhaps again at 2 years and then perhaps again at 5 years.  He and his mom asked appropriate questions and had them answered to their apparent satisfaction.       REASON FOR CONSULTATION: Consult (dr garcia patient. DOS 5-21-24 PSF T3-L1 for Thoracic Disc & Scheuermanns Kyphosis)     REFERRING PHYSICIAN: Jayson Garcia  PCP:No Ref-Primary, Physician      History of Present Illness:  20 year  old male, here for transfer of care from Dr. Torres (retired), 1 yr s/p PISF T3-L1 for Scheuermann's kyphosis.  This is my first encounter with patient.    Accompanied by mom Jen.  Doing very well.  Happy with surgery.  Posture improved compared to preoperative.  Back pain also much improved.  Now back to doing full activities, normal activities.  No major complaints at this visit.    Going to college in [place], majoring in business economics.  Lives in [facility].      Oswestry (LUIS) Questionnaire        5/22/2025     8:38 AM   OSWESTRY DISABILITY INDEX   Count 10    Sum 6    Oswestry Score (%) 12 %        Patient-reported      S/p PISF T3-T11; TTIF T8-9; SPO's T6-T11 (5/21/24):  LUIS preop 24.44%  5wk  20%  2.5mo  10%  6mo  11.11%      Visual Analog Pain Scale  Back Pain Scale 0-10: 2 (low back pain)  Right leg pain: 0  Left leg pain: 0  Neck Pain Scale 0-10: 0  Right arm pain: 0  Left arm pain: 0    PROMIS-10 Scores  Global Mental Health Score: (Patient-Rptd) (P) 10  Global Physical Health Score: (Patient-Rptd) (P) 16  PROMIS TOTAL - SUBSCORES: (Patient-Rptd) (P) 26    ROS:  A 12-point review of systems was completed and is negative except for otherwise noted above in the history of present illness.    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       Surg Hx:  Past Surgical History:   Procedure Laterality Date     EXTRACTION(S) DENTAL       OPTICAL TRACKING SYSTEM FUSION POSTERIOR SPINE THORACIC THREE+ LEVELS N/A 5/21/2024    Procedure: Posterior Spinal Fusion Thoracic 3-Lumbar 1, Segmental Instrumentation, Hernandez Gallo Osteotomies Thoracic 6-11, thoracic 8-9 discetomy, transforaminal interbody fusion, insert intervertebral device;  Surgeon: Jayson Torres MD;  Location: [facility] OR       Allergies:  Allergies   Allergen  Reactions     Eye Drops Allergy Relief [Tetrahydrozoline-Zn Sulfate]      Other reaction(s): rhinitis       Meds:  Current Outpatient Medications   Medication Sig Dispense Refill     acetaminophen (TYLENOL) 325 MG tablet Take 3 tablets (975 mg) by mouth every 8 hours (Patient not taking: Reported on 5/22/2025) 60 tablet 0     gabapentin (NEURONTIN) 300 MG capsule Take 3 capsules (900 mg) by mouth 3 times daily (Patient not taking: Reported on 11/14/2024) 540 capsule 0     HYDROmorphone (DILAUDID) 2 MG tablet Take 1-2 tablets (2-4 mg) by mouth every 4 hours as needed for severe pain (wean off as pain improves) (Patient not taking: Reported on 11/14/2024) 84 tablet 0     hydrOXYzine HCl (ATARAX) 25 MG tablet Take 1 tablet (25 mg) by mouth every 6 hours as needed for other or itching (adjuvant pain) (Patient not taking: Reported on 11/14/2024) 30 tablet 3     Methocarbamol 1000 MG TABS Take 1,000 mg by mouth 4 times daily (Patient not taking: Reported on 11/14/2024) 60 tablet 0     multivitamin w/minerals (MULTI-VITAMIN) tablet Take 1 tablet by mouth daily When remember's (Patient not taking: Reported on 11/14/2024)       polyethylene glycol (MIRALAX) 17 GM/Dose powder Take 17 g by mouth daily (Patient not taking: Reported on 11/14/2024) 510 g 3     senna-docusate (SENOKOT-S/PERICOLACE) 8.6-50 MG tablet Take 2 tablets by mouth 2 times daily (Patient not taking: Reported on 11/14/2024) 60 tablet 0     No current facility-administered medications for this visit.       Fam Hx:  Family History   Problem Relation Age of Onset     Other - See Comments Father        P/S Hx:  Social History     Tobacco Use     Smoking status: Never     Passive exposure: Never     Smokeless tobacco: Never   Substance Use Topics     Alcohol use: Yes     Comment: 1 -2 drinks on wkd's 'not alway's'         Physical Exam:  Very pleasant, healthy appearing, alert, oriented x 3, cooperative.  Normal mood and affect.  Not in cardiorespiratory  "distress.  Ht 1.849 m (6' 0.8\")   Wt 70.3 kg (155 lb)   BMI 20.56 kg/m    Normal upright posture.    Normal gait without assistive device.  No antalgia / imbalance.    Per patient, well-healed midline posterior surgical scar.  No significant back pain.    Neuro Exam:  Neurologically intact motor and sensory both upper and both lower extremities.      Imaging:    EOS full body AP lateral standing x-rays taken today show stable bilateral segmental posterior instrumentation T3-L1; stable interbody cage T8-9.  Improved thoracic alignment, with reduced kyphosis compared to preoperative x-rays.  There are also has been improvement of compensatory lumbar hyperlordosis compared to preoperative.    Per patient request, we also went over all his preoperative x-rays.  The earliest x-ray was from 2017, which already showed some degree of thoracic hyperkyphosis.  There, however, was significant progression between 2017 and 2019, and has remained pretty stable until 2024 when he had surgery.    Assessment:    1.  1 year s/p PISF T3-L1, TTIF T8-9 (5/21/2024 Dr. Torres) for Scheuermann's kyphosis and thoracic HNP; doing very well, with improved preoperative back pain and improved posture.    Plan:    Congratulated and reassured patient and mom regarding my clinical and radiographic findings.  X-rays do not show any evidence of mechanical loosening or failure of his instrumentation; thus suggestive of already solid arthrodesis across all levels.  Alignment is maintained, and significantly improved versus preoperative.  No sign of junctional failure.  May continue with full activities as tolerated.    Return to clinic as needed.  15 minutes spent on the date of the encounter doing chart review/review of outside records/review of test results/interpretation of tests/patient visit/documentation/discussion with other provider(s)/discussion with patient and family.    Kalia Alvarez MD    Orthopaedic Spine " Surgery  Dept Orthopaedic Surgery, [facility]  [phone] office, [phone] pager  [email or web address]     Again, thank you for allowing me to participate in the care of your patient.        Sincerely,        Kalia Alvarez MD    Electronically signed

## 2025-05-27 NOTE — TELEPHONE ENCOUNTER
See phone message from pt & see my triage note of 7-3-24.  DOS 5-21-24.    I called pt back again who stated Right arm ache is better but Left middle of back pain is increased.  Stabbing pain when tries  to use it or when reaches.  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  Not seeing Physical therapy yet.   Taking Gabapentin, Tylenol & Robaxin as ordered which are not helping pain. .   I advised pt see a provider for eval & offered appt but pt stated pt is in [place]   Primary provider is in [place], [place].   Pt will see provider at home to decide if PT will help & call back prn. Pt agreed.    Rizwana Salcido RN.    Home

## 2025-07-28 ENCOUNTER — MYC MEDICAL ADVICE (OUTPATIENT)
Dept: ORTHOPEDICS | Facility: CLINIC | Age: 20
End: 2025-07-28
Payer: COMMERCIAL

## 2025-07-28 DIAGNOSIS — M42.00 SCHEUERMANN'S KYPHOSIS: ICD-10-CM

## 2025-07-28 DIAGNOSIS — M54.9 UPPER BACK PAIN: ICD-10-CM

## 2025-07-28 DIAGNOSIS — Z98.1 S/P FUSION OF THORACIC SPINE: Primary | ICD-10-CM

## (undated) DEVICE — SOL NACL 0.9% IRRIG 1000ML BOTTLE 2F7124

## (undated) DEVICE — SU VICRYL 2-0 CT-2 CR 8X18" J726D

## (undated) DEVICE — SUCTION MANIFOLD NEPTUNE 2 SYS 4 PORT 0702-020-000

## (undated) DEVICE — PREP DURAPREP REMOVER 4OZ 8611

## (undated) DEVICE — SUTURE VICRYL+ 1 CT-1 CR 8X18" VIO VCP741D

## (undated) DEVICE — KIT BONE MILL PREP & CARTRIDGE 5420-PRP-000

## (undated) DEVICE — SU MONOCRYL 3-0 PS-2 18" UND MCP497G

## (undated) DEVICE — CELL SAVER

## (undated) DEVICE — LINEN TOWEL PACK X30 5481

## (undated) DEVICE — STPL SKIN 35W ROTATING HEAD PRW35

## (undated) DEVICE — MARKER SPHERES PASSIVE MEDT PACK 5 8801075

## (undated) DEVICE — IOM SUPPLIES/CASE FEE

## (undated) DEVICE — LINEN STRADDLE PACK

## (undated) DEVICE — PACK SPINE INSTRUMENT DRAPE 76091-ACM7820

## (undated) DEVICE — IMPLANTABLE DEVICE
Type: IMPLANTABLE DEVICE | Site: SPINE THORACIC | Status: NON-FUNCTIONAL
Removed: 2024-05-21

## (undated) DEVICE — DRAPE O ARM TUBE 9732722

## (undated) DEVICE — SUCTION TIP YANKAUER STR K87

## (undated) DEVICE — GLOVE BIOGEL PI MICRO INDICATOR UNDERGLOVE SZ 8.0 48980

## (undated) DEVICE — GLOVE BIOGEL PI SZ 8.0 40880

## (undated) DEVICE — Device

## (undated) DEVICE — TOOL DISSECT MIDAS MR8 14CM MATCH HEAD 3MM MR8-14MH30

## (undated) DEVICE — ESU GROUND PAD UNIVERSAL W/O CORD

## (undated) DEVICE — BLADE BONE MILL STRK MED 5420-MED-000

## (undated) DEVICE — DRAIN ROUND W/RESERV KIT JACKSON PRATT 10FR 400ML SU130-402D

## (undated) DEVICE — RX SURGIFLO HEMOSTATIC MATRIX W/THROMBIN 8ML 2994

## (undated) DEVICE — SPONGE COTTONOID 1X1" 80-1403

## (undated) DEVICE — LINEN BACK PACK 5440

## (undated) DEVICE — SOL WATER IRRIG 1000ML BOTTLE 2F7114

## (undated) DEVICE — SUTURE VICRYL+ 1 CT-1 36" VCP347H

## (undated) DEVICE — POSITIONER ARMBOARD FOAM 1PAIR LF FP-ARMB1

## (undated) DEVICE — MIDAS REX DIFFUSER LUBRICANT MR7 PA700

## (undated) RX ORDER — FENTANYL CITRATE-0.9 % NACL/PF 10 MCG/ML
PLASTIC BAG, INJECTION (ML) INTRAVENOUS
Status: DISPENSED
Start: 2024-05-21

## (undated) RX ORDER — CEFAZOLIN SODIUM/WATER 2 G/20 ML
SYRINGE (ML) INTRAVENOUS
Status: DISPENSED
Start: 2024-05-21

## (undated) RX ORDER — EPHEDRINE SULFATE 50 MG/ML
INJECTION, SOLUTION INTRAMUSCULAR; INTRAVENOUS; SUBCUTANEOUS
Status: DISPENSED
Start: 2024-05-21

## (undated) RX ORDER — FENTANYL CITRATE 50 UG/ML
INJECTION, SOLUTION INTRAMUSCULAR; INTRAVENOUS
Status: DISPENSED
Start: 2024-05-21

## (undated) RX ORDER — SCOLOPAMINE TRANSDERMAL SYSTEM 1 MG/1
PATCH, EXTENDED RELEASE TRANSDERMAL
Status: DISPENSED
Start: 2024-05-21

## (undated) RX ORDER — HYDROMORPHONE HYDROCHLORIDE 1 MG/ML
INJECTION, SOLUTION INTRAMUSCULAR; INTRAVENOUS; SUBCUTANEOUS
Status: DISPENSED
Start: 2024-05-21

## (undated) RX ORDER — ACETAMINOPHEN 325 MG/1
TABLET ORAL
Status: DISPENSED
Start: 2024-05-21

## (undated) RX ORDER — VANCOMYCIN HYDROCHLORIDE 1 G/20ML
INJECTION, POWDER, LYOPHILIZED, FOR SOLUTION INTRAVENOUS
Status: DISPENSED
Start: 2024-05-21

## (undated) RX ORDER — GABAPENTIN 300 MG/1
CAPSULE ORAL
Status: DISPENSED
Start: 2024-05-21

## (undated) RX ORDER — GLYCOPYRROLATE 0.2 MG/ML
INJECTION, SOLUTION INTRAMUSCULAR; INTRAVENOUS
Status: DISPENSED
Start: 2024-05-21